# Patient Record
Sex: FEMALE | Race: WHITE | Employment: OTHER | URBAN - METROPOLITAN AREA
[De-identification: names, ages, dates, MRNs, and addresses within clinical notes are randomized per-mention and may not be internally consistent; named-entity substitution may affect disease eponyms.]

---

## 2017-09-15 RX ORDER — LORAZEPAM 2 MG/1
2 TABLET ORAL
Qty: 180 TAB | Refills: 0 | Status: SHIPPED | OUTPATIENT
Start: 2017-09-15 | End: 2017-09-15 | Stop reason: SDUPTHER

## 2017-09-15 NOTE — TELEPHONE ENCOUNTER
Requested Prescriptions     Pending Prescriptions Disp Refills    LORazepam (ATIVAN) 2 mg tablet 180 Tab 0     Sig: Take 2 tablets at bedtime       Last Refill: 06/21/2017  Next Appointment: none  Last Seen  01/12/2017

## 2017-09-15 NOTE — TELEPHONE ENCOUNTER
Requested Prescriptions     Pending Prescriptions Disp Refills    LORazepam (ATIVAN) 2 mg tablet 180 Tab 0     Sig: Take 1 Tab by mouth nightly. Max Daily Amount: 2 mg.        Last Refill: 6/21/17  Next Appointment:enedelia sy last seen 1/12/17

## 2017-09-18 RX ORDER — LORAZEPAM 2 MG/1
TABLET ORAL
Qty: 180 TAB | Refills: 0 | OUTPATIENT
Start: 2017-09-18 | End: 2017-12-15 | Stop reason: SDUPTHER

## 2017-12-05 ENCOUNTER — TELEPHONE (OUTPATIENT)
Dept: INTERNAL MEDICINE CLINIC | Age: 67
End: 2017-12-05

## 2017-12-05 NOTE — TELEPHONE ENCOUNTER
Patient would like to get generic for Crestor. Can you send a prescription over to [facility].  Please advise patient.

## 2017-12-13 NOTE — TELEPHONE ENCOUNTER
Requested Prescriptions     Pending Prescriptions Disp Refills    rosuvastatin (CRESTOR) 10 mg tablet 90 Tab 1     Sig: Take 1 Tab by mouth daily.        Last Refill: 09/18/17  Next Appointment:01/22/18

## 2017-12-14 RX ORDER — ROSUVASTATIN CALCIUM 10 MG/1
10 TABLET, COATED ORAL DAILY
Qty: 90 TAB | Refills: 3 | Status: SHIPPED | OUTPATIENT
Start: 2017-12-14 | End: 2018-12-10 | Stop reason: SDUPTHER

## 2017-12-15 RX ORDER — LORAZEPAM 2 MG/1
TABLET ORAL
Qty: 180 TAB | Refills: 0 | Status: SHIPPED | OUTPATIENT
Start: 2017-12-15 | End: 2018-03-09 | Stop reason: SDUPTHER

## 2018-01-18 PROBLEM — N20.0 NEPHROLITHIASIS: Status: ACTIVE | Noted: 2018-01-18

## 2018-01-18 PROBLEM — E89.40 POST HYSTERECTOMY MENOPAUSE: Status: ACTIVE | Noted: 2018-01-18

## 2018-01-18 PROBLEM — K52.9 INFLAMMATORY BOWEL DISEASE: Status: ACTIVE | Noted: 2018-01-18

## 2018-01-18 PROBLEM — F51.04 CHRONIC INSOMNIA: Status: ACTIVE | Noted: 2018-01-18

## 2018-01-18 PROBLEM — M54.30 SCIATICA: Status: ACTIVE | Noted: 2018-01-18

## 2018-01-18 PROBLEM — Z12.31 VISIT FOR SCREENING MAMMOGRAM: Status: ACTIVE | Noted: 2018-01-18

## 2018-01-18 PROBLEM — E78.5 DYSLIPIDEMIA: Status: ACTIVE | Noted: 2018-01-18

## 2018-01-18 PROBLEM — I10 HTN (HYPERTENSION): Status: ACTIVE | Noted: 2018-01-18

## 2018-01-18 PROBLEM — R94.39 EBT (ELECTRON BEAM TOMOGRAPHY) ABNORMAL: Status: ACTIVE | Noted: 2018-01-18

## 2018-01-18 PROBLEM — Z79.899 ON STATIN THERAPY: Status: ACTIVE | Noted: 2018-01-18

## 2018-01-18 PROBLEM — Z00.00 ANNUAL PHYSICAL EXAM: Status: ACTIVE | Noted: 2018-01-18

## 2018-01-18 PROBLEM — N39.0 ACUTE LOWER UTI: Status: ACTIVE | Noted: 2018-01-18

## 2018-01-18 PROBLEM — Z90.710 POST HYSTERECTOMY MENOPAUSE: Status: ACTIVE | Noted: 2018-01-18

## 2018-01-18 PROBLEM — H26.9 CATARACT: Status: ACTIVE | Noted: 2018-01-18

## 2018-01-18 PROBLEM — N60.19 FIBROCYSTIC BREAST CHANGES: Status: ACTIVE | Noted: 2018-01-18

## 2018-01-18 RX ORDER — FOLIC ACID 1 MG/1
TABLET ORAL DAILY
COMMUNITY

## 2018-01-18 RX ORDER — AMLODIPINE BESYLATE 5 MG/1
5 TABLET ORAL DAILY
COMMUNITY
End: 2018-01-22

## 2018-01-22 ENCOUNTER — OFFICE VISIT (OUTPATIENT)
Dept: INTERNAL MEDICINE CLINIC | Age: 68
End: 2018-01-22

## 2018-01-22 VITALS
BODY MASS INDEX: 26.8 KG/M2 | HEIGHT: 64 IN | TEMPERATURE: 97.9 F | DIASTOLIC BLOOD PRESSURE: 84 MMHG | SYSTOLIC BLOOD PRESSURE: 138 MMHG | HEART RATE: 83 BPM | WEIGHT: 157 LBS | RESPIRATION RATE: 18 BRPM | OXYGEN SATURATION: 97 %

## 2018-01-22 DIAGNOSIS — Z12.31 ENCOUNTER FOR SCREENING MAMMOGRAM FOR MALIGNANT NEOPLASM OF BREAST: ICD-10-CM

## 2018-01-22 DIAGNOSIS — Z79.899 ON STATIN THERAPY: ICD-10-CM

## 2018-01-22 DIAGNOSIS — Z79.899 HIGH RISK MEDICATION USE: ICD-10-CM

## 2018-01-22 DIAGNOSIS — Z13.6 SCREENING FOR ISCHEMIC HEART DISEASE: ICD-10-CM

## 2018-01-22 DIAGNOSIS — E78.5 DYSLIPIDEMIA: ICD-10-CM

## 2018-01-22 DIAGNOSIS — Z13.39 SCREENING FOR ALCOHOLISM: ICD-10-CM

## 2018-01-22 DIAGNOSIS — I10 ESSENTIAL HYPERTENSION: Chronic | ICD-10-CM

## 2018-01-22 DIAGNOSIS — Z13.1 SCREENING FOR DIABETES MELLITUS: ICD-10-CM

## 2018-01-22 DIAGNOSIS — Z11.59 NEED FOR HEPATITIS C SCREENING TEST: ICD-10-CM

## 2018-01-22 DIAGNOSIS — Z12.11 SCREEN FOR COLON CANCER: ICD-10-CM

## 2018-01-22 DIAGNOSIS — Z13.31 SCREENING FOR DEPRESSION: ICD-10-CM

## 2018-01-22 DIAGNOSIS — Z00.00 INITIAL MEDICARE ANNUAL WELLNESS VISIT: Primary | ICD-10-CM

## 2018-01-22 LAB
ALBUMIN SERPL-MCNC: 4.7 G/DL (ref 3.9–5.4)
ALKALINE PHOS POC: 94 U/L (ref 38–126)
ALT SERPL-CCNC: 43 U/L (ref 9–52)
AST SERPL-CCNC: 29 U/L (ref 14–36)
BACTERIA UA POCT, BACTPOCT: NORMAL
BILIRUB UR QL STRIP: NEGATIVE
BUN BLD-MCNC: 13 MG/DL (ref 7–17)
CALCIUM BLD-MCNC: 9.7 MG/DL (ref 8.4–10.2)
CASTS UA POCT: NORMAL
CHLORIDE BLD-SCNC: 93 MMOL/L (ref 98–107)
CHOLEST SERPL-MCNC: 203 MG/DL (ref 0–200)
CK (CPK) POC: 61 U/L (ref 30–135)
CLUE CELLS, CLUEPOCT: NEGATIVE
CO2 POC: 26 MMOL/L (ref 22–32)
CREAT BLD-MCNC: 0.6 MG/DL (ref 0.7–1.2)
CRYSTALS UA POCT, CRYSPOCT: NEGATIVE
EGFR (POC): 95
EPITHELIAL CELLS POCT: NEGATIVE
GLUCOSE POC: 91 MG/DL (ref 65–105)
GLUCOSE UR-MCNC: NEGATIVE MG/DL
GRAN# POC: 3.9 K/UL (ref 2–7.8)
GRAN% POC: 72.9 % (ref 37–92)
HCT VFR BLD CALC: 40.6 % (ref 37–51)
HDLC SERPL-MCNC: 102 MG/DL (ref 35–130)
HGB BLD-MCNC: 13.6 G/DL (ref 12–18)
KETONES P FAST UR STRIP-MCNC: NEGATIVE MG/DL
LDL CHOLESTEROL POC: 88.4 MG/DL (ref 0–130)
LY# POC: 1.1 K/UL (ref 0.6–4.1)
LY% POC: 21.2 % (ref 10–58.5)
MCH RBC QN: 33.2 PG (ref 26–32)
MCHC RBC-ENTMCNC: 33.6 G/DL (ref 30–36)
MCV RBC: 99 FL (ref 80–97)
MID #, POC: 0.3 K/UL (ref 0–1.8)
MID% POC: 5.9 % (ref 0.1–24)
MUCUS UA POCT, MUCPOCT: NORMAL
PH UR STRIP: 7 [PH] (ref 5–7)
PLATELET # BLD: 244 K/UL (ref 140–440)
POTASSIUM SERPL-SCNC: 3.6 MMOL/L (ref 3.6–5)
PROT SERPL-MCNC: 8.1 G/DL (ref 6.3–8.2)
PROT UR QL STRIP: NEGATIVE
RBC # BLD: 4.1 M/UL (ref 4.2–6.3)
RBC UA POCT, RBCPOCT: 0
SODIUM SERPL-SCNC: 130 MMOL/L (ref 137–145)
SP GR UR STRIP: 1.01 (ref 1.01–1.02)
TCHOL/HDL RATIO (POC): 2 (ref 0–4)
TOTAL BILIRUBIN POC: 1.1 MG/DL (ref 0.2–1.3)
TRICH UA POCT, TRICHPOC: NEGATIVE
TRIGL SERPL-MCNC: 63 MG/DL (ref 0–200)
UA UROBILINOGEN AMB POC: NORMAL (ref 0.2–1)
URINALYSIS CLARITY POC: CLEAR
URINALYSIS COLOR POC: NORMAL
URINE BLOOD POC: NEGATIVE
URINE CULT COMMENT, POCT: NORMAL
URINE LEUKOCYTES POC: NEGATIVE
URINE NITRITES POC: NEGATIVE
VLDLC SERPL CALC-MCNC: 12.6 MG/DL
WBC # BLD: 5.3 K/UL (ref 4.1–10.9)
WBC UA POCT, WBCPOCT: 0
YEAST UA POCT, YEASTPOC: NEGATIVE

## 2018-01-22 RX ORDER — METHOTREXATE 2.5 MG/1
20 TABLET ORAL
COMMUNITY
Start: 2018-01-18

## 2018-01-22 NOTE — PROGRESS NOTES
Chief Complaint   Patient presents with    Hypertension     6 month follow up; Room 8     1. Have you been to the ER, urgent care clinic since your last visit? Hospitalized since your last visit? No    2. Have you seen or consulted any other health care providers outside of the [address] [place] [address] since your last visit? Include any pap smears or colon screening.  No

## 2018-01-22 NOTE — PATIENT INSTRUCTIONS
Medicare Wellness Visit, Female    The best way to live healthy is to have a healthy lifestyle by eating a well-balanced diet, exercising regularly, limiting alcohol and stopping smoking. Regular physical exams and screening tests are another way to keep healthy. Preventive exams provided by your health care provider can find health problems before they become diseases or illnesses. Preventive services including immunizations, screening tests, monitoring and exams can help you take care of your own health. All people over age 72 should have a pneumovax  and and a prevnar shot to prevent pneumonia. These are once in a lifetime unless you and your provider decide differently. All people over 65 should have a yearly flu shot and a tetanus vaccine every 10 years. A bone mass density to screen for osteoporosis or thinning of the bones should be done every 2 years after 65. Screening for diabetes mellitus with a blood sugar test should be done every year. Glaucoma is a disease of the eye due to increased ocular pressure that can lead to blindness and it should be done every year by an eye professional.    Cardiovascular screening tests that check for elevated lipids (fatty part of blood) which can lead to heart disease and strokes should be done every 5 years. Colorectal screening that evaluates for blood or polyps in your colon should be done yearly as a stool test or every five years as a flexible sigmoidoscope or every 10 years as a colonoscopy up to age 76. Breast cancer screening with a mammogram is recommended biennially  for women age 54-69. Screening for cervical cancer with a pap smear and pelvic exam is recommended for women after age 72 years every 2 years up to age 79 or when the provider and patient decide to stop. If there is a history of cervical abnormalities or other increased risk for cancer then the test is recommended yearly.     Hepatitis C screening is also recommended for anyone born between 80 through [address]. A shingles vaccine is also recommended once in a lifetime after age 61. Your Medicare Wellness Exam is recommended annually. Here is a list of your current Health Maintenance items with a due date:  Health Maintenance Due   Topic Date Due    Hepatitis C Test  1950    DTaP/Tdap/Td  (1 - Tdap) 03/11/1972    Stool testing for trace blood  03/11/2001    Shingles Vaccine  01/11/2011    Breast Cancer Screening  08/11/2013    Glaucoma Screening   03/11/2016    Bone Density Screening  03/11/2016    Pneumococcal Vaccine (1 of 2 - PCV13) 03/11/2016    Annual Well Visit  03/11/2016    Flu Vaccine  08/01/2017       All Medicare beneficiaries aged 48 and older are covered; however, when a beneficiary is at high risk, there is no minimum age required to receive a screening colonoscopy or a barium enema rendered as an alternative to a screening colonoscopy. The following are the coverage criteria for each colorectal cancer screening test/procedure. Screening FOBT  Medicare provides coverage of a screening FOBT annually (i.e., at least 11 months have passed following the month in which the last covered screening FOBT was performed) for beneficiaries aged 48 and older. This screening requires a written order from the beneficiarys attending physician. NOTE: Medicare will only provide coverage for one FOBT per year: either HCPCS code  or CPT code 99288, but not both. Screening Colonoscopy  For Beneficiaries at [facility] for Developing Colorectal Cancer  Medicare provides coverage of a screening colonoscopy (HCPCS code ) once every 2 years for beneficiaries at high risk for developing colorectal cancer (i.e., at least 23 months have passed following the month in which the last covered screening colonoscopy [HCPCS code ] was performed).     For Beneficiaries Not at [address] for Developing Colorectal Cancer  Medicare provides coverage of a screening colonoscopy (\[facility] [place] [facility]\"" code ) for beneficiaries who do not meet the criteria for being at high risk for developing colorectal cancer once every 10 years (i.e., at least 119 months have passed following the month in which the last covered screening colonoscopy [facility]). If the beneficiary otherwise qualifies to have a covered screening colonoscopy ([facility] ) based on the above but has had a covered screening flexible sigmoidoscopy ([facility] code ), then Medicare may cover a screening colonoscopy ([facility] ) only after at least 47 months have passed following the month in which the last covered screening flexible sigmoidoscopy ([facility] ) was performed. Beneficiaries Diagnosed with Pre-Diabetes  Medicare provides coverage for a maximum of 2 diabetes screening tests within a 12-month period (but not less than 6 months apart) for beneficiaries diagnosed with pre-diabetes. Beneficiaries Previously Tested but not Diagnosed as Pre-Diabetic or Who Have Never Been Tested  Medicare provides coverage for 1 diabetes screening test within a 12-month period (i.e., at least 11 months have passed following the month in which the last Medicare-covered diabetes screening test was performed) for beneficiaries who were previously tested and were not diagnosed with pre-diabetes, or who have never been tested. Risk Factors  To be eligible for the diabetes screening tests, beneficiaries must have any of the following risk factors:   Hypertension,   Dyslipidemia,   Obesity (a body mass index greater than or equal to 30 kg/m), or   Previous identification of an elevated impaired fasting glucose or glucose tolerance.   OR  At least two of the following characteristics:   Overweight (a body mass index greater than 25 but less than 30 kg/m),   Family history of diabetes,   Aged 72 years and older, or   A history of gestational diabetes mellitus or delivery of a baby weighing greater than 9 pounds.

## 2018-01-22 NOTE — MR AVS SNAPSHOT
[address] [place] [address] [address] [address] [phone] Patient: Ashley Lott MRN: [id number] Visit Information Date & Time Provider Department Dept. Phone Encounter #  
 1/22/2018  2:30 PM VIJAY De La O MD [facility] [id number] Upcoming Health Maintenance Date Due Hepatitis C Screening 1950 DTaP/Tdap/Td series (1 - Tdap) 3/11/1972 FOBT Q 1 YEAR AGE 50-75 3/11/2001 ZOSTER VACCINE AGE 60> 1/11/2011 BREAST CANCER SCRN MAMMOGRAM 8/11/2013 GLAUCOMA SCREENING Q2Y 3/11/2016 OSTEOPOROSIS SCREENING (DEXA) 3/11/2016 Pneumococcal 65+ Low/Medium Risk (1 of 2 - PCV13) 3/11/2016 MEDICARE YEARLY EXAM 3/11/2016 Influenza Age 5 to Adult 8/1/2017 Allergies as of 1/22/2018  Review Complete On: 1/22/2018 By: Linda York MD  
  
 Severity Noted Reaction Type Reactions Levaquin [Levofloxacin]  01/18/2018    Hives Current Immunizations  Reviewed on 6/7/2011 No immunizations on file. Not reviewed this visit You Were Diagnosed With   
  
 Codes Comments Initial Medicare annual wellness visit    -  Primary ICD-10-CM: Z00.00 ICD-9-CM: V70.0 Essential hypertension     ICD-10-CM: I10 
ICD-9-CM: 401.9 On statin therapy     ICD-10-CM: Z79.899 ICD-9-CM: V58.69 Dyslipidemia     ICD-10-CM: E78.5 ICD-9-CM: 272.4 Screening for alcoholism     ICD-10-CM: Z13.89 ICD-9-CM: V79.1 Screening for depression     ICD-10-CM: Z13.89 ICD-9-CM: V79.0 Screen for colon cancer     ICD-10-CM: Z12.11 ICD-9-CM: V76.51 Screening for diabetes mellitus     ICD-10-CM: Z13.1 ICD-9-CM: V77.1 Screening for ischemic heart disease     ICD-10-CM: Z13.6 ICD-9-CM: V81.0 Need for hepatitis C screening test     ICD-10-CM: Z11.59 
ICD-9-CM: V73.89  Encounter for screening mammogram for malignant neoplasm of breast ICD-10-CM: Z12.31 
ICD-9-CM: V76.12 High risk medication use     ICD-10-CM: Z79.899 ICD-9-CM: V58.69 Vitals BP Pulse Temp Resp Height(growth percentile) Weight(growth percentile) 138/84 (BP 1 Location: Left arm, BP Patient Position: Sitting) 83 97.9 °F (36.6 °C) (Oral) 18 5' 4\" (1.626 m) 157 lb (71.2 kg) SpO2 BMI OB Status Smoking Status 97% 26.95 kg/m2 Hysterectomy Never Smoker Vitals History BMI and BSA Data Body Mass Index Body Surface Area  
 26.95 kg/m 2 1.79 m 2 Preferred Pharmacy Pharmacy Name Phone [id number] - [place], [address] [facility] [phone] Your Updated Medication List  
  
   
This list is accurate as of: 1/22/18  3:54 PM.  Always use your most recent med list.  
  
  
  
  
 aspirin 81 mg tablet Take 81 mg by mouth. dilTIAZem  mg XR capsule Commonly known as:  DILACOR XR Take  by mouth daily. FISH OIL 1,000 mg Cap Generic drug:  omega-3 fatty acids-vitamin e Take 1 Cap by mouth daily. folic acid 1 mg tablet Commonly known as:  [employer] Take  by mouth daily. GLUCOSAMINE-CHONDROITIN PO Take 1 Tab by mouth daily. LORazepam 2 mg tablet Commonly known as:  ATIVAN Take 2 tablets at bedtime  
  
 methotrexate 2.5 mg tablet Commonly known as:  RHEUMATREX  
  
 methotrexate 2.5 mg/mL Soln Take  by mouth. MULTIPLE VITAMIN tablet Generic drug:  multivitamin Take 1 Tab by mouth daily. rosuvastatin 10 mg tablet Commonly known as:  CRESTOR Take 1 Tab by mouth daily. VITAMIN D2 PO Take  by mouth. ZyrTEC 10 mg Cap Generic drug:  Cetirizine Take  by mouth. We Performed the Following AMB POC CK (CPK) [30553 CPT(R)] AMB POC COMPLETE CBC,AUTOMATED ENTER [id number] CPT(R)] AMB POC COMPREHENSIVE METABOLIC PANEL [25246 CPT(R)] AMB POC LIPID PROFILE [08992 CPT(R)] AMB POC URINALYSIS DIP STICK AUTO W/ MICRO  [77590 CPT(R)] COLOGUARD TEST (FECAL DNA COLORECTAL CANCER SCREENING) [59743 CPT(R)] Emilie 68 [[id number] [facility]] HEPATITIS C AB [65308 CPT(R)] NE ANNUAL ALCOHOL SCREEN 15 MIN [id number] [facility]] To-Do List   
 01/25/2018 Imaging:  JAMIN MAMMO BI SCREENING INCL CAD Patient Instructions Medicare Wellness Visit, Female The best way to live healthy is to have a healthy lifestyle by eating a well-balanced diet, exercising regularly, limiting alcohol and stopping smoking. Regular physical exams and screening tests are another way to keep healthy. Preventive exams provided by your health care provider can find health problems before they become diseases or illnesses. Preventive services including immunizations, screening tests, monitoring and exams can help you take care of your own health. All people over age 72 should have a pneumovax  and and a prevnar shot to prevent pneumonia. These are once in a lifetime unless you and your provider decide differently. All people over 65 should have a yearly flu shot and a tetanus vaccine every 10 years. A bone mass density to screen for osteoporosis or thinning of the bones should be done every 2 years after 65. Screening for diabetes mellitus with a blood sugar test should be done every year. Glaucoma is a disease of the eye due to increased ocular pressure that can lead to blindness and it should be done every year by an eye professional. 
 
Cardiovascular screening tests that check for elevated lipids (fatty part of blood) which can lead to heart disease and strokes should be done every 5 years. Colorectal screening that evaluates for blood or polyps in your colon should be done yearly as a stool test or every five years as a flexible sigmoidoscope or every 10 years as a colonoscopy up to age 76. Breast cancer screening with a mammogram is recommended biennially  for women age 54-69. Screening for cervical cancer with a pap smear and pelvic exam is recommended for women after age 72 years every 2 years up to age 79 or when the provider and patient decide to stop. If there is a history of cervical abnormalities or other increased risk for cancer then the test is recommended yearly. Hepatitis C screening is also recommended for anyone born between 80 through [address]. A shingles vaccine is also recommended once in a lifetime after age 61. Your Medicare Wellness Exam is recommended annually. Here is a list of your current Health Maintenance items with a due date: 
Health Maintenance Due Topic Date Due  
 Hepatitis C Test  1950  
 DTaP/Tdap/Td  (1 - Tdap) 03/11/1972  Stool testing for trace blood  03/11/2001  Shingles Vaccine  01/11/2011  Breast Cancer Screening  08/11/2013  Glaucoma Screening   03/11/2016  Bone Density Screening  03/11/2016  Pneumococcal Vaccine (1 of 2 - PCV13) 03/11/2016 [facility] Annual Well Visit  03/11/2016  Flu Vaccine  08/01/2017 All Medicare beneficiaries aged 48 and older are covered; however, when a beneficiary is at high risk, there is no minimum age required to receive a screening colonoscopy or a barium enema rendered as an alternative to a screening colonoscopy. The following are the coverage criteria for each colorectal cancer screening test/procedure. Screening FOBT Medicare provides coverage of a screening FOBT annually (i.e., at least 11 months have passed following the month in which the last covered screening FOBT was performed) for beneficiaries aged 48 and older. This screening requires a written order from the beneficiarys attending physician. NOTE: Medicare will only provide coverage for one FOBT per year: either HCPCS code  or CPT code 66005, but not both. Screening Colonoscopy For Beneficiaries at [address] for Developing Colorectal Cancer Medicare provides coverage of a screening colonoscopy ([facility] ) once every 2 years for beneficiaries at high risk for developing colorectal cancer (i.e., at least 23 months have passed following the month in which the last covered screening colonoscopy [HCPCS code ] was performed). For Beneficiaries Not at [address] for Developing Colorectal Cancer Medicare provides coverage of a screening colonoscopy (HCPCS code ) for beneficiaries who do not meet the criteria for being at high risk for developing colorectal cancer once every 10 years (i.e., at least 119 months have passed following the month in which the last covered screening colonoscopy [HCPCS code ] was performed). If the beneficiary otherwise qualifies to have a covered screening colonoscopy (HCPCS code ) based on the above but has had a covered screening flexible sigmoidoscopy (HCPCS code ), then Medicare may cover a screening colonoscopy (HCPCS code ) only after at least 47 months have passed following the month in which the last covered screening flexible sigmoidoscopy ([facility] ) was performed. Beneficiaries Diagnosed with Pre-Diabetes Medicare provides coverage for a maximum of 2 diabetes screening tests within a 12-month period (but not less than 6 months apart) for beneficiaries diagnosed with pre-diabetes. Beneficiaries Previously Tested but not Diagnosed as Pre-Diabetic or Who Have Never Been Tested Medicare provides coverage for 1 diabetes screening test within a 12-month period (i.e., at least 11 months have passed following the month in which the last Medicare-covered diabetes screening test was performed) for beneficiaries who were previously tested and were not diagnosed with pre-diabetes, or who have never been tested. Risk Factors To be eligible for the diabetes screening tests, beneficiaries must have any of the following risk factors:  Hypertension,  Dyslipidemia, 
 Obesity (a body mass index greater than or equal to 30 kg/m), or 
 Previous identification of an elevated impaired fasting glucose or glucose tolerance. OR At least two of the following characteristics:  Overweight (a body mass index greater than 25 but less than 30 kg/m),  Family history of diabetes,  Aged 72 years and older, or  A history of gestational diabetes mellitus or delivery of a baby weighing greater than 9 pounds. Introducing [facility]! [facility] introduces [employer] patient portal. Now you can access parts of your medical record, email your doctor's office, and request medication refills online. 1. In your internet browser, go to [email or web address] 2. Click on the First Time User? Click Here link in the Sign In box. You will see the New Member Sign Up page. 3. Enter your [employer] Access Code exactly as it appears below. You will not need to use this code after youve completed the sign-up process. If you do not sign up before the expiration date, you must request a new code. · [employer] Access Code: [email or web address] Expires: 4/22/2018  2:36 PM 
 
4. Enter the last four digits of your Social Security Number (xxxx) and Date of Birth (mm/dd/yyyy) as indicated and click Submit. You will be taken to the next sign-up page. 5. Create a [employer] ID. This will be your [employer] login ID and cannot be changed, so think of one that is secure and easy to remember. 6. Create a [employer] password. You can change your password at any time. 7. Enter your Password Reset Question and Answer. This can be used at a later time if you forget your password. 8. Enter your e-mail address. You will receive e-mail notification when new information is available in [address] 9. Click Sign Up. You can now view and download portions of your medical record. 10. Click the Download Summary menu link to download a portable copy of your medical information. If you have questions, please visit the Frequently Asked Questions section of the [employer] website. Remember, [employer] is NOT to be used for urgent needs. For medical emergencies, dial 911. Now available from your iPhone and Android! Please provide this summary of care documentation to your next provider. Your primary care clinician is listed as VIJAY Guzman. If you have any questions after today's visit, please call [phone].

## 2018-01-22 NOTE — PROGRESS NOTES
This is an Initial Medicare Annual Wellness Exam (AWV) (Performed 12 months after IPPE or effective date of Medicare Part B enrollment, Once in a lifetime)    I have reviewed the patient's medical history in detail and updated the computerized patient record. History     Past Medical History:   Diagnosis Date    Acute lower UTI 1/18/2018    Annual physical exam 1/18/2018    Arrhythmia 2000    TACHYCARDIA RHYTHM x1 episode 2000    Arthritis     spinal stenosis; previous spine surgery    Cataract 1/18/2018    Chronic insomnia 1/18/2018    Chronic kidney disease 2009    STONES    Dyslipidemia 1/18/2018    EBT (electron beam tomography) abnormal 1/18/2018    Fibrocystic breast changes 1/18/2018    High cholesterol     HTN (hypertension) 1/18/2018    Inflammatory bowel disease 1/18/2018    Nephrolithiasis 1/18/2018    On statin therapy 1/18/2018    Post hysterectomy menopause 1/18/2018    Sciatica 1/18/2018    Visit for screening mammogram 1/18/2018      Past Surgical History:   Procedure Laterality Date    HX CATARACT REMOVAL      HX GYN      hysterectomy 1980    HX TONSILLECTOMY      HX UROLOGICAL  2009    URETERAL STENT    NEUROLOGICAL PROCEDURE UNLISTED  6/11    Back; 2 buldging disc fused, bone removed from hip     Current Outpatient Prescriptions   Medication Sig Dispense Refill    methotrexate (RHEUMATREX) 2.5 mg tablet       folic acid (FOLVITE) 1 mg tablet Take  by mouth daily.  ERGOCALCIFEROL, VITAMIN D2, (VITAMIN D2 PO) Take  by mouth.  Cetirizine (ZYRTEC) 10 mg cap Take  by mouth.  LORazepam (ATIVAN) 2 mg tablet Take 2 tablets at bedtime 180 Tab 0    rosuvastatin (CRESTOR) 10 mg tablet Take 1 Tab by mouth daily. 90 Tab 3    multivitamin (MULTIPLE VITAMIN) tablet Take 1 Tab by mouth daily.  omega-3 fatty acids-vitamin e (FISH OIL) 1,000 mg Cap Take 1 Cap by mouth daily.  aspirin 81 mg tablet Take 81 mg by mouth.       diltiazem XR (DILACOR XR) 240 mg XR capsule Take  by mouth daily.  methotrexate 2.5 mg/mL soln Take  by mouth.  GLUCOSAMINE HCL/CHONDRO PAYNE A (GLUCOSAMINE-CHONDROITIN PO) Take 1 Tab by mouth daily. Allergies   Allergen Reactions    Levaquin [Levofloxacin] Hives     Family History   Problem Relation Age of Onset    Heart Disease Father      4 VESSEL CABG    Alcohol abuse Maternal Grandmother     Heart Disease Maternal Grandmother      MI    Alcohol abuse Maternal Grandfather     Asthma Paternal Grandmother      Social History   Substance Use Topics    Smoking status: Never Smoker    Smokeless tobacco: Never Used    Alcohol use 6.0 oz/week     12 Cans of beer per week     Patient Active Problem List   Diagnosis Code    Spinal stenosis, lumbar region, with neurogenic claudication M48.062    Hypertension I10    Nephrolithiasis N20.0    EBT (electron beam tomography) abnormal R94.39    Inflammatory bowel disease K52.9    Post hysterectomy menopause E89.40, Z90.710    On statin therapy Z79.899    Dyslipidemia E78.5    HTN (hypertension) I10    Annual physical exam Z00.00    Cataract H26.9    Visit for screening mammogram Z12.31    Chronic insomnia F51.04    Fibrocystic breast changes N60.19    Sciatica M54.30    Acute lower UTI N39.0    Essential hypertension I10       Depression Risk Factor Screening:     PHQ over the last two weeks 1/22/2018   Little interest or pleasure in doing things Not at all   Feeling down, depressed or hopeless Not at all   Total Score PHQ 2 0     Alcohol Risk Factor Screening: You average more than 7 drinks a week. Functional Ability and Level of Safety:     Hearing Loss  Hearing is good. Activities of Daily Living  The home contains: no safety equipment. Patient does total self care    Fall Risk  Fall Risk Assessment, last 12 mths 1/22/2018   Able to walk? Yes   Fall in past 12 months?  No       Abuse Screen  Patient is not abused    Cognitive Screening   Evaluation of Cognitive Function:  Has your family/caregiver stated any concerns about your memory: no  Normal    Patient Care Team   Patient Care Team:  Elvin Umaña MD as PCP - General (Internal Medicine)    Assessment/Plan   Education and counseling provided:  Are appropriate based on today's review and evaluation    Diagnoses and all orders for this visit:    1. Essential hypertension    2. On statin therapy    3. Dyslipidemia    4. Initial Medicare annual wellness visit    5. Screening for alcoholism  -     Annual  Alcohol Screen 15 min ()    6. Screening for depression  -     Depression Screen Annual    7. Screen for colon cancer    8. Screening for diabetes mellitus    9. Screening for ischemic heart disease    10. Need for hepatitis C screening test  -     HEPATITIS C AB    11. Encounter for screening mammogram for malignant neoplasm of breast  -     Bilateral Digital Screening Mammography; Future         Health Maintenance Due   Topic Date Due    Hepatitis C Screening  1950    DTaP/Tdap/Td series (1 - Tdap) 03/11/1972    FOBT Q 1 YEAR AGE 50-75  03/11/2001    ZOSTER VACCINE AGE 60>  01/11/2011    BREAST CANCER SCRN MAMMOGRAM  08/11/2013    GLAUCOMA SCREENING Q2Y  03/11/2016    OSTEOPOROSIS SCREENING (DEXA)  03/11/2016    Pneumococcal 65+ Low/Medium Risk (1 of 2 - PCV13) 03/11/2016    MEDICARE YEARLY EXAM  03/11/2016    Influenza Age 9 to Adult  08/01/2017     This note will not be viewable in [address] Iris Swain is a 78 y.o. female and presents with Hypertension (6 month follow up; Room 8)  . Subjective:  Mrs. Roman Kidd presents today for follow-up of several problems including hypertension and hyperlipidemia. She is doing well on her current medical regimen and denies any side effects from her medication. She has had no chest pain, shortness breath, PND, orthopnea, or pedal edema.     Past Medical History:   Diagnosis Date    Acute lower UTI 1/18/2018    Annual physical exam 1/18/2018    Arrhythmia 2000    TACHYCARDIA RHYTHM x1 episode 2000    Arthritis     spinal stenosis; previous spine surgery    Cataract 1/18/2018    Chronic insomnia 1/18/2018    Chronic kidney disease 2009    STONES    Dyslipidemia 1/18/2018    EBT (electron beam tomography) abnormal 1/18/2018    Fibrocystic breast changes 1/18/2018    High cholesterol     HTN (hypertension) 1/18/2018    Inflammatory bowel disease 1/18/2018    Nephrolithiasis 1/18/2018    On statin therapy 1/18/2018    Post hysterectomy menopause 1/18/2018    Sciatica 1/18/2018    Visit for screening mammogram 1/18/2018     Past Surgical History:   Procedure Laterality Date    HX CATARACT REMOVAL      HX GYN      hysterectomy 1980    HX TONSILLECTOMY      HX UROLOGICAL  2009    URETERAL STENT    NEUROLOGICAL PROCEDURE UNLISTED  6/11    Back; 2 buldging disc fused, bone removed from hip     Allergies   Allergen Reactions    Levaquin [Levofloxacin] Hives     Current Outpatient Prescriptions   Medication Sig Dispense Refill    methotrexate (RHEUMATREX) 2.5 mg tablet       folic acid (FOLVITE) 1 mg tablet Take  by mouth daily.  ERGOCALCIFEROL, VITAMIN D2, (VITAMIN D2 PO) Take  by mouth.  Cetirizine (ZYRTEC) 10 mg cap Take  by mouth.  LORazepam (ATIVAN) 2 mg tablet Take 2 tablets at bedtime 180 Tab 0    rosuvastatin (CRESTOR) 10 mg tablet Take 1 Tab by mouth daily. 90 Tab 3    multivitamin (MULTIPLE VITAMIN) tablet Take 1 Tab by mouth daily.  omega-3 fatty acids-vitamin e (FISH OIL) 1,000 mg Cap Take 1 Cap by mouth daily.  aspirin 81 mg tablet Take 81 mg by mouth.  diltiazem XR (DILACOR XR) 240 mg XR capsule Take  by mouth daily.  methotrexate 2.5 mg/mL soln Take  by mouth.  GLUCOSAMINE HCL/CHONDRO PAYNE A (GLUCOSAMINE-CHONDROITIN PO) Take 1 Tab by mouth daily.        Social History     Social History    Marital status:      Spouse name: N/A    Number of children: N/A    Years of education: N/A Social History Main Topics    Smoking status: Never Smoker    Smokeless tobacco: Never Used    Alcohol use 6.0 oz/week     12 Cans of beer per week    Drug use: Yes     Special: Prescription    Sexual activity: Not Asked     Other Topics Concern    None     Social History Narrative     Family History   Problem Relation Age of Onset    Heart Disease Father      4 VESSEL CABG    Alcohol abuse Maternal Grandmother     Heart Disease Maternal Grandmother      MI    Alcohol abuse Maternal Grandfather     Asthma Paternal Grandmother        Review of Systems  Constitutional:  negative for fevers, chills, anorexia and weight loss  Eyes:    negative for visual disturbance and irritation  ENT:    negative for tinnitus,sore throat,nasal congestion,ear pains. hoarseness  Respiratory:     negative for cough, hemoptysis, dyspnea,wheezing  CV:    negative for chest pain, palpitations, lower extremity edema  GI:    negative for nausea, vomiting, diarrhea, abdominal pain,melena  Endo:               negative for polyuria,polydipsia,polyphagia,heat intolerance  Genitourinary : negative for frequency, dysuria and hematuria  Integumentary: negative for rash and pruritus  Hematologic:   negative for easy bruising and gum/nose bleeding  Musculoskel:  negative for myalgias, arthralgias, back pain, muscle weakness, joint pain  Neurological:   negative for headaches, dizziness, vertigo, memory problems and gait   Behavl/Psych:  negative for feelings of anxiety, depression, mood changes  ROS otherwise negative      Objective:  Visit Vitals    /84 (BP 1 Location: Left arm, BP Patient Position: Sitting)    Pulse 83    Temp 97.9 °F (36.6 °C) (Oral)    Resp 18    Ht 5' 4\" (1.626 m)    Wt 157 lb (71.2 kg)    SpO2 97%    BMI 26.95 kg/m2     Physical Exam:   General appearance - alert, well appearing, and in no distress  Mental status - alert, oriented to person, place, and time  EYE-ABDIAZIZ, EOMI, fundi normal, corneas normal, no foreign bodies  ENT-ENT exam normal, no neck nodes or sinus tenderness  Nose - normal and patent, no erythema, discharge or polyps  Mouth - mucous membranes moist, pharynx normal without lesions  Neck - supple, no significant adenopathy   Chest - clear to auscultation, no wheezes, rales or rhonchi, symmetric air entry   Heart - normal rate, regular rhythm, normal S1, S2, no murmurs, rubs, clicks or gallops   Abdomen - soft, nontender, nondistended, no masses or organomegaly  Lymph- no adenopathy palpable  Ext-peripheral pulses normal, no pedal edema, no clubbing or cyanosis  Skin-Warm and dry. no hyperpigmentation, vitiligo, or suspicious lesions  Neuro -alert, oriented, normal speech, no focal findings, mild left-sided facial weakness, or residual ptosis from previous Bell's palsy      Assessment/Plan:  Diagnoses and all orders for this visit:    1. Initial Medicare annual wellness visit    2. Essential hypertension  -     AMB POC URINALYSIS DIP STICK AUTO W/ MICRO     3. On statin therapy  -     AMB POC COMPREHENSIVE METABOLIC PANEL  -     AMB POC CK (CPK)    4. Dyslipidemia  -     AMB POC LIPID PROFILE    5. Screening for alcoholism  -     Annual  Alcohol Screen 15 min ()    6. Screening for depression  -     Depression Screen Annual    7. Screen for colon cancer  -     COLOGUARD TEST (FECAL DNA COLORECTAL CANCER SCREENING)    8. Screening for diabetes mellitus    9. Screening for ischemic heart disease    10. Need for hepatitis C screening test  -     HEPATITIS C AB    11. Encounter for screening mammogram for malignant neoplasm of breast  -     Bilateral Digital Screening Mammography; Future    12. High risk medication use  -     AMB POC COMPLETE CBC,AUTOMATED ENTER          ICD-10-CM ICD-9-CM    1. Initial Medicare annual wellness visit Z00.00 V70.0    2. Essential hypertension I10 401.9 AMB POC URINALYSIS DIP STICK AUTO W/ MICRO    3.  On statin therapy Z79.899 V58.69 AMB POC COMPREHENSIVE METABOLIC PANEL      AMB POC CK (CPK)   4. Dyslipidemia E78.5 272.4 AMB POC LIPID PROFILE   5. Screening for alcoholism Z13.89 V79.1 PA ANNUAL ALCOHOL SCREEN 15 MIN   6. Screening for depression Z13.89 V79.0 DEPRESSION SCREEN ANNUAL   7. Screen for colon cancer Z12.11 V76.51 COLOGUARD TEST (FECAL DNA COLORECTAL CANCER SCREENING)   8. Screening for diabetes mellitus Z13.1 V77.1    9. Screening for ischemic heart disease Z13.6 V81.0    10. Need for hepatitis C screening test Z11.59 V73.89 HEPATITIS C AB   11. Encounter for screening mammogram for malignant neoplasm of breast Z12.31 V76.12 JAMIN MAMMO BI SCREENING INCL CAD   12. High risk medication use Z79.899 V58.69 AMB POC COMPLETE CBC,AUTOMATED ENTER     Plan:    Continue medications as outlined above. Further recommendations based on lab results. She will be referred for screening mammography. She will be set up for a cologuard test for colon cancer screening. She understands that this is positive she will need a colonoscopy. She declines vaccines given her previous history of Bell's palsy citing a concern for recurring Bell's palsy in patients who have been given a flu vaccine in the medical literature. Follow-up Disposition: Not on File    I have reviewed with the patient details of the assessment and plan and all questions were answered. Relevent patient education was performed. Verbal and/or written instructions (see AVS) provided. The most recent lab findings were reviewed with the patient. Plan was discussed with patient who verbally expressed understanding. An After Visit Summary was printed and given to the patient.     Kartik Peterson MD

## 2018-01-23 LAB — HCV AB S/CO SERPL IA: <0.1 S/CO RATIO (ref 0–0.9)

## 2018-02-06 ENCOUNTER — HOSPITAL ENCOUNTER (OUTPATIENT)
Dept: MAMMOGRAPHY | Age: 68
Discharge: HOME | End: 2018-02-06
Attending: INTERNAL MEDICINE
Payer: MEDICARE

## 2018-02-06 DIAGNOSIS — Z12.31 ENCOUNTER FOR SCREENING MAMMOGRAM FOR MALIGNANT NEOPLASM OF BREAST: ICD-10-CM

## 2018-02-06 PROCEDURE — 77067 SCR MAMMO BI INCL CAD: CPT

## 2018-03-09 DIAGNOSIS — F41.1 GAD (GENERALIZED ANXIETY DISORDER): Primary | ICD-10-CM

## 2018-03-09 RX ORDER — LORAZEPAM 2 MG/1
TABLET ORAL
Qty: 180 TAB | Refills: 0 | Status: SHIPPED | OUTPATIENT
Start: 2018-03-09 | End: 2018-04-19 | Stop reason: SDUPTHER

## 2018-03-09 NOTE — TELEPHONE ENCOUNTER
Requested Prescriptions     Pending Prescriptions Disp Refills    LORazepam (ATIVAN) 2 mg tablet 180 Tab 0     Sig: Take 2 tablets at bedtime     Upcoming visit:  07/23/2018  Recent visit:       01/22/2018

## 2018-04-19 DIAGNOSIS — F41.1 GAD (GENERALIZED ANXIETY DISORDER): ICD-10-CM

## 2018-04-19 RX ORDER — LORAZEPAM 2 MG/1
TABLET ORAL
Qty: 180 TAB | Refills: 0 | Status: SHIPPED | OUTPATIENT
Start: 2018-04-19 | End: 2018-06-11 | Stop reason: SDUPTHER

## 2018-04-19 NOTE — TELEPHONE ENCOUNTER
Requested Prescriptions     Pending Prescriptions Disp Refills    LORazepam (ATIVAN) 2 mg tablet 180 Tab 0     Sig: Take 2 tablets at bedtime       Last Refill: 3/9/18  Next Appointment:7/23/18

## 2018-05-09 RX ORDER — AMLODIPINE BESYLATE 5 MG/1
TABLET ORAL
Qty: 90 TAB | Refills: 2 | Status: SHIPPED | OUTPATIENT
Start: 2018-05-09 | End: 2019-01-29 | Stop reason: SDUPTHER

## 2018-06-11 DIAGNOSIS — F41.1 GAD (GENERALIZED ANXIETY DISORDER): ICD-10-CM

## 2018-06-11 RX ORDER — LORAZEPAM 2 MG/1
TABLET ORAL
Qty: 180 TAB | Refills: 0 | Status: SHIPPED | OUTPATIENT
Start: 2018-06-11 | End: 2018-09-10 | Stop reason: SDUPTHER

## 2018-06-11 NOTE — TELEPHONE ENCOUNTER
Requested Prescriptions     Pending Prescriptions Disp Refills    LORazepam (ATIVAN) 2 mg tablet 180 Tab 0     Sig: Take 2 tablets at bedtime       Last Refill: 4/19/18  Next Appointment:7/23/18

## 2018-09-10 DIAGNOSIS — F41.1 GAD (GENERALIZED ANXIETY DISORDER): ICD-10-CM

## 2018-09-10 RX ORDER — LORAZEPAM 2 MG/1
TABLET ORAL
Qty: 180 TAB | Refills: 0 | Status: SHIPPED | OUTPATIENT
Start: 2018-09-10 | End: 2018-12-10 | Stop reason: SDUPTHER

## 2018-12-10 DIAGNOSIS — F41.1 GAD (GENERALIZED ANXIETY DISORDER): ICD-10-CM

## 2018-12-10 NOTE — TELEPHONE ENCOUNTER
Requested Prescriptions     Pending Prescriptions Disp Refills    LORazepam (ATIVAN) 2 mg tablet 180 Tab 0     Sig: Take 2 tablets at bedtime       Last Refill: 09/10/18  Next Appointment:02/25/19

## 2018-12-11 RX ORDER — ROSUVASTATIN CALCIUM 10 MG/1
TABLET, COATED ORAL
Qty: 90 TAB | Refills: 2 | Status: SHIPPED | OUTPATIENT
Start: 2018-12-11 | End: 2019-09-04 | Stop reason: SDUPTHER

## 2018-12-11 RX ORDER — LORAZEPAM 2 MG/1
TABLET ORAL
Qty: 180 TAB | Refills: 0 | Status: SHIPPED | OUTPATIENT
Start: 2018-12-11 | End: 2019-03-08 | Stop reason: SDUPTHER

## 2019-01-30 RX ORDER — AMLODIPINE BESYLATE 5 MG/1
TABLET ORAL
Qty: 90 TAB | Refills: 1 | Status: SHIPPED | OUTPATIENT
Start: 2019-01-30 | End: 2019-07-23 | Stop reason: SDUPTHER

## 2019-02-25 ENCOUNTER — OFFICE VISIT (OUTPATIENT)
Dept: INTERNAL MEDICINE CLINIC | Age: 69
End: 2019-02-25

## 2019-02-25 VITALS
TEMPERATURE: 97.5 F | BODY MASS INDEX: 26.63 KG/M2 | HEIGHT: 64 IN | HEART RATE: 68 BPM | DIASTOLIC BLOOD PRESSURE: 78 MMHG | SYSTOLIC BLOOD PRESSURE: 138 MMHG | OXYGEN SATURATION: 98 % | WEIGHT: 156 LBS | RESPIRATION RATE: 16 BRPM

## 2019-02-25 DIAGNOSIS — Z79.899 ON STATIN THERAPY: ICD-10-CM

## 2019-02-25 DIAGNOSIS — E78.5 DYSLIPIDEMIA: ICD-10-CM

## 2019-02-25 DIAGNOSIS — I10 ESSENTIAL HYPERTENSION: Chronic | ICD-10-CM

## 2019-02-25 DIAGNOSIS — Z13.31 SCREENING FOR DEPRESSION: ICD-10-CM

## 2019-02-25 DIAGNOSIS — Z00.00 MEDICARE ANNUAL WELLNESS VISIT, SUBSEQUENT: Primary | ICD-10-CM

## 2019-02-25 DIAGNOSIS — K52.9 INFLAMMATORY BOWEL DISEASE: ICD-10-CM

## 2019-02-25 DIAGNOSIS — Z13.39 SCREENING FOR ALCOHOLISM: ICD-10-CM

## 2019-02-25 DIAGNOSIS — Z13.1 SCREENING FOR DIABETES MELLITUS: ICD-10-CM

## 2019-02-25 LAB
ANION GAP SERPL CALC-SCNC: 11 MMOL/L
BILIRUB UR QL: NEGATIVE
BUN SERPL-MCNC: 11 MG/DL (ref 7–17)
CALCIUM SERPL-MCNC: 10 MG/DL (ref 8.4–10.2)
CHLORIDE SERPL-SCNC: 101 MMOL/L (ref 98–107)
CHOL/HDL RATIO,CHHD: 2 RATIO (ref 0–4)
CHOLEST SERPL-MCNC: 189 MG/DL (ref 0–200)
CK SERPL-CCNC: 53 U/L (ref 30–135)
CLARITY: CLEAR
CO2 SERPL-SCNC: 25 MMOL/L (ref 22–32)
COLOR UR: NORMAL
CREAT SERPL-MCNC: 0.5 MG/DL (ref 0.7–1.2)
GLUCOSE 24H UR-MRATE: NEGATIVE G/(24.H)
GLUCOSE SERPL-MCNC: 99 MG/DL (ref 65–105)
HDLC SERPL-MCNC: 97 MG/DL (ref 35–130)
HGB UR QL STRIP: NEGATIVE
KETONES UR QL STRIP.AUTO: NEGATIVE
LDL/HDL RATIO,LDHD: 1 RATIO
LDLC SERPL CALC-MCNC: 84 MG/DL (ref 0–130)
LEUKOCYTE ESTERASE: NEGATIVE
NITRITE UR QL STRIP.AUTO: NEGATIVE
PH UR STRIP: 7 [PH] (ref 5–7)
POTASSIUM SERPL-SCNC: 4.4 MMOL/L (ref 3.6–5)
PROT UR STRIP-MCNC: NEGATIVE MG/DL
SODIUM SERPL-SCNC: 137 MMOL/L (ref 137–145)
SP GR UR REFRACTOMETRY: 1.01 (ref 1–1.03)
TRIGL SERPL-MCNC: 40 MG/DL (ref 0–200)
UROBILINOGEN UR QL STRIP.AUTO: NEGATIVE
VLDLC SERPL CALC-MCNC: 8 MG/DL

## 2019-02-25 NOTE — PROGRESS NOTES
This is the Subsequent Medicare Annual Wellness Exam, performed 12 months or more after the Initial AWV or the last Subsequent AWV I have reviewed the patient's medical history in detail and updated the computerized patient record. History Past Medical History:  
Diagnosis Date  Acute lower UTI 1/18/2018  Annual physical exam 1/18/2018  Arrhythmia 2000 TACHYCARDIA RHYTHM x1 episode 2000  Arthritis   
 spinal stenosis; previous spine surgery  Cataract 1/18/2018  Chronic insomnia 1/18/2018  Chronic kidney disease 2009 STONES  Dyslipidemia 1/18/2018  EBT (electron beam tomography) abnormal 1/18/2018  Fibrocystic breast changes 1/18/2018  High cholesterol  HTN (hypertension) 1/18/2018  Inflammatory bowel disease 1/18/2018  Nephrolithiasis 1/18/2018  On statin therapy 1/18/2018  Post hysterectomy menopause 1/18/2018  Sciatica 1/18/2018  Visit for screening mammogram 1/18/2018 Past Surgical History:  
Procedure Laterality Date  HX CATARACT REMOVAL    
 HX GYN    
 hysterectomy 1980  HX TONSILLECTOMY  HX UROLOGICAL  2009 URETERAL STENT  
 NEUROLOGICAL PROCEDURE UNLISTED  6/11 Back; 2 buldging disc fused, bone removed from hip Current Outpatient Medications Medication Sig Dispense Refill  amLODIPine (NORVASC) 5 mg tablet TAKE ONE TABLET BY MOUTH ONE TIME DAILY 90 Tab 1  
 LORazepam (ATIVAN) 2 mg tablet Take 2 tablets at bedtime 180 Tab 0  
 rosuvastatin (CRESTOR) 10 mg tablet TAKE ONE TABLET BY MOUTH ONE TIME DAILY  90 Tab 2  
 methotrexate (RHEUMATREX) 2.5 mg tablet  folic acid (FOLVITE) 1 mg tablet Take  by mouth daily.  methotrexate 2.5 mg/mL soln Take  by mouth.  ERGOCALCIFEROL, VITAMIN D2, (VITAMIN D2 PO) Take  by mouth.  Cetirizine (ZYRTEC) 10 mg cap Take  by mouth.  multivitamin (MULTIPLE VITAMIN) tablet Take 1 Tab by mouth daily.  aspirin 81 mg tablet Take 81 mg by mouth.  diltiazem XR (DILACOR XR) 240 mg XR capsule Take  by mouth daily.  omega-3 fatty acids-vitamin e (FISH OIL) 1,000 mg Cap Take 1 Cap by mouth daily.  GLUCOSAMINE HCL/CHONDRO PAYNE A (GLUCOSAMINE-CHONDROITIN PO) Take 1 Tab by mouth daily. Allergies Allergen Reactions  Levaquin [Levofloxacin] Hives Family History Problem Relation Age of Onset  Heart Disease Father 4 VESSEL CABG  Alcohol abuse Maternal Grandmother  Heart Disease Maternal Grandmother MI  
 Alcohol abuse Maternal Grandfather  Asthma Paternal Grandmother Social History Tobacco Use  Smoking status: Never Smoker  Smokeless tobacco: Never Used Substance Use Topics  Alcohol use: Yes Alcohol/week: 6.0 oz Types: 12 Cans of beer per week Patient Active Problem List  
Diagnosis Code  Spinal stenosis, lumbar region, with neurogenic claudication M48.062  
 Hypertension I10  
 Nephrolithiasis N20.0  EBT (electron beam tomography) abnormal R94.39  
 Inflammatory bowel disease K52.9  Post hysterectomy menopause E89.40, Z90.710  
 On statin therapy Z79.899  Dyslipidemia E78.5  
 HTN (hypertension) I10  
 Annual physical exam Z00.00  Cataract H26.9  Visit for screening mammogram Z12.31  
 Chronic insomnia F51.04  
 Fibrocystic breast changes N60.19  Sciatica M54.30  Acute lower UTI N39.0  Essential hypertension I10 Depression Risk Factor Screening:  
 
3 most recent PHQ Screens 2/25/2019 Little interest or pleasure in doing things Not at all Feeling down, depressed, irritable, or hopeless Not at all Total Score PHQ 2 0 Alcohol Risk Factor Screening: You do not drink alcohol or very rarely. Functional Ability and Level of Safety:  
Hearing Loss Hearing is good. Activities of Daily Living The home contains: no safety equipment. Patient does total self care Fall Risk Fall Risk Assessment, last 12 mths 2/25/2019 Able to walk? Yes Fall in past 12 months? No  
 
 
Abuse Screen Patient is not abused Cognitive Screening Evaluation of Cognitive Function: 
Has your family/caregiver stated any concerns about your memory: no 
Normal 
 
Patient Care Team  
Patient Care Team: 
Henrik Valdovinos MD as PCP - General (Internal Medicine) Assessment/Plan Education and counseling provided: 
Are appropriate based on today's review and evaluation Diagnoses and all orders for this visit: 
 
1. Medicare annual wellness visit, subsequent 2. Essential hypertension -     METABOLIC PANEL, BASIC 
-     LIPID PANEL 
-     URINALYSIS W/O MICRO 3. Inflammatory bowel disease 4. On statin therapy 
-     CK 5. Dyslipidemia -     LIPID PANEL 6. Screening for alcoholism -     [place] ANNUAL ALCOHOL SCREEN 15 MIN 
 
7. Screening for depression 
-     [facility] Screening for diabetes mellitus Health Maintenance Due Topic Date Due  
 DTaP/Tdap/Td series (1 - Tdap) 03/11/1972  Shingrix Vaccine Age 50> (1 of 2) 03/11/2001  
 FOBT Q 1 YEAR AGE 50-75  03/11/2001  GLAUCOMA SCREENING Q2Y  03/11/2016  Bone Densitometry (Dexa) Screening  03/11/2016  Pneumococcal 65+ Low/Medium Risk (1 of 2 - PCV13) 03/11/2016  Influenza Age 5 to Adult  08/01/2018  MEDICARE YEARLY EXAM  01/23/2019 This note will not be viewable in [address] Thomas Junior is a 80 y.o. female and presents with Hypertension (6 month f/u) Abby Hidalgo Subjective: 
 
Mrs. Mariana Lam presents today for follow-up of multiple problems including hypertension, hyperlipidemia, and monitoring of statin therapy. She is being treated for rheumatoid arthritis as well and has her labs done on a regular basis. She denies any shortness of breath, chest pain, palpitations, PND, orthopnea, or pedal edema. She continues to take Ativan as needed for chronic insomnia. Review of Systems Constitutional: negative for fevers, chills, anorexia and weight loss Eyes:   negative for visual disturbance and irritation ENT:   negative for tinnitus,sore throat,nasal congestion,ear pains. hoarseness Respiratory:  negative for cough, hemoptysis, dyspnea,wheezing CV:   negative for chest pain, palpitations, lower extremity edema GI:   negative for nausea, vomiting, diarrhea, abdominal pain,melena Endo:               negative for polyuria,polydipsia,polyphagia,heat intolerance Genitourinary: negative for frequency, dysuria and hematuria Integumentary: negative for rash and pruritus Hematologic:  negative for easy bruising and gum/nose bleeding Musculoskel: negative for myalgias, arthralgias, back pain, muscle weakness, joint pain Neurological:  negative for headaches, dizziness, vertigo, memory problems and gait Behavl/Psych: negative for feelings of anxiety, depression, mood changes Past Medical History:  
Diagnosis Date  Acute lower UTI 1/18/2018  Annual physical exam 1/18/2018  Arrhythmia 2000 TACHYCARDIA RHYTHM x1 episode 2000  Arthritis   
 spinal stenosis; previous spine surgery  Cataract 1/18/2018  Chronic insomnia 1/18/2018  Chronic kidney disease 2009 STONES  Dyslipidemia 1/18/2018  EBT (electron beam tomography) abnormal 1/18/2018  Fibrocystic breast changes 1/18/2018  High cholesterol  HTN (hypertension) 1/18/2018  Inflammatory bowel disease 1/18/2018  Nephrolithiasis 1/18/2018  On statin therapy 1/18/2018  Post hysterectomy menopause 1/18/2018  Sciatica 1/18/2018  Visit for screening mammogram 1/18/2018 Past Surgical History:  
Procedure Laterality Date  HX CATARACT REMOVAL    
 HX GYN    
 hysterectomy 1980  HX TONSILLECTOMY  HX UROLOGICAL  2009 URETERAL STENT  
 NEUROLOGICAL PROCEDURE UNLISTED  6/11 Back; 2 buldging disc fused, bone removed from hip Social History Socioeconomic History  Marital status:  Spouse name: Not on file  Number of children: Not on file  Years of education: Not on file  Highest education level: Not on file Tobacco Use  Smoking status: Never Smoker  Smokeless tobacco: Never Used Substance and Sexual Activity  Alcohol use: Yes Alcohol/week: 6.0 oz Types: 12 Cans of beer per week  Drug use: Yes Types: Prescription Family History Problem Relation Age of Onset  Heart Disease Father 4 VESSEL CABG  Alcohol abuse Maternal Grandmother  Heart Disease Maternal Grandmother MI  
 Alcohol abuse Maternal Grandfather  Asthma Paternal Grandmother Current Outpatient Medications Medication Sig Dispense Refill  amLODIPine (NORVASC) 5 mg tablet TAKE ONE TABLET BY MOUTH ONE TIME DAILY 90 Tab 1  
 LORazepam (ATIVAN) 2 mg tablet Take 2 tablets at bedtime 180 Tab 0  
 rosuvastatin (CRESTOR) 10 mg tablet TAKE ONE TABLET BY MOUTH ONE TIME DAILY  90 Tab 2  
 methotrexate (RHEUMATREX) 2.5 mg tablet  folic acid (FOLVITE) 1 mg tablet Take  by mouth daily.  methotrexate 2.5 mg/mL soln Take  by mouth.  ERGOCALCIFEROL, VITAMIN D2, (VITAMIN D2 PO) Take  by mouth.  Cetirizine (ZYRTEC) 10 mg cap Take  by mouth.  multivitamin (MULTIPLE VITAMIN) tablet Take 1 Tab by mouth daily.  aspirin 81 mg tablet Take 81 mg by mouth.  diltiazem XR (DILACOR XR) 240 mg XR capsule Take  by mouth daily.  omega-3 fatty acids-vitamin e (FISH OIL) 1,000 mg Cap Take 1 Cap by mouth daily.  GLUCOSAMINE HCL/CHONDRO PAYNE A (GLUCOSAMINE-CHONDROITIN PO) Take 1 Tab by mouth daily. Allergies Allergen Reactions  Levaquin [Levofloxacin] Hives Objective: 
Visit Vitals /78 (BP 1 Location: Left arm, BP Patient Position: Sitting) Pulse 68 Temp 97.5 °F (36.4 °C) (Oral) Resp 16 Ht 5' 4\" (1.626 m) Wt 156 lb (70.8 kg) SpO2 98% BMI 26.78 kg/m² Physical Exam: General appearance - alert, well appearing, and in no distress Mental status - alert, oriented to person, place, and time EYE-ABDIAZIZ, EOMI, fundi normal, corneas normal, no foreign bodies ENT-ENT exam normal, no neck nodes or sinus tenderness Nose - normal and patent, no erythema, discharge or polyps Mouth - mucous membranes moist, pharynx normal without lesions Neck - supple, no significant adenopathy Chest - clear to auscultation, no wheezes, rales or rhonchi, symmetric air entry Heart - normal rate, regular rhythm, normal S1, S2, no murmurs, rubs, clicks or gallops Abdomen - soft, nontender, nondistended, no masses or organomegaly Lymph- no adenopathy palpable Ext-peripheral pulses normal, no pedal edema, no clubbing or cyanosis Skin-Warm and dry. no hyperpigmentation, vitiligo, or suspicious lesions Neuro -alert, oriented, normal speech, no focal findings or movement disorder noted Musculoskeletal- FROM, no bony abnormalities, no point tenderness Breast - normal, no masses Pelvic - normal external genitalia, no inguinal hernia, normal rectal tone, no blood per rectum, no hemorrhoids. No results found for this visit on 02/25/19. All results for lab orders may not have been returned by the time this encountered was closed. Assessment/Plan: 
 
Orders Placed This Encounter  Depression Screen Annual  
 METABOLIC PANEL, BASIC ([facility])  LIPID PANEL ([facility])  CK ([facility])  URINALYSIS W/O MICRO ([facility])  Annual  Alcohol Screen 15 min () Problem List Items Addressed This Visit Hypertension (Chronic) Relevant Orders METABOLIC PANEL, BASIC  
 LIPID PANEL  
 URINALYSIS W/O MICRO Inflammatory bowel disease On statin therapy Relevant Orders CK Dyslipidemia Relevant Orders LIPID PANEL Other Visit Diagnoses Medicare annual wellness visit, subsequent    -  Primary Screening for alcoholism Relevant Orders [place] ANNUAL ALCOHOL SCREEN 15 MIN Screening for depression Relevant Orders Emilie Torres Screening for diabetes mellitus Plan: 
 
Continue current medical regimen as outlined above. Further recommendations based on labs. Patient had her mammogram done last year. Follow-up bone density and colonoscopy to be done at appropriate intervals. Patient Instructions Medicare Wellness Visit, Female The best way to live healthy is to have a lifestyle where you eat a well-balanced diet, exercise regularly, limit alcohol use, and quit all forms of tobacco/nicotine, if applicable. Regular preventive services are another way to keep healthy. Preventive services (vaccines, screening tests, monitoring & exams) can help personalize your care plan, which helps you manage your own care. Screening tests can find health problems at the earliest stages, when they are easiest to treat. [facility] follows the current, evidence-based guidelines published by the [facility] Essie (USPSTF) when recommending preventive services for our patients. Because we follow these guidelines, sometimes recommendations change over time as research supports it. (For example, mammograms used to be recommended annually. Even though Medicare will still pay for an annual mammogram, the newer guidelines recommend a mammogram every two years for women of average risk.) Of course, you and your doctor may decide to screen more often for some diseases, based on your risk and your health status. Preventive services for you include: - Medicare offers their members a free annual wellness visit, which is time for you and your primary care provider to discuss and plan for your preventive service needs.  Take advantage of this benefit every year! 
-All adults over the age of 72 should receive the recommended pneumonia vaccines. Current USPSTF guidelines recommend a series of two vaccines for the best pneumonia protection.  
-All adults should have a flu vaccine yearly and a tetanus vaccine every 10 years. All adults age 61 and older should receive a shingles vaccine once in their lifetime.   
-A bone mass density test is recommended when a woman turns 65 to screen for osteoporosis. This test is only recommended one time, as a screening. Some providers will use this same test as a disease monitoring tool if you already have osteoporosis. -All adults age 38-68 who are overweight should have a diabetes screening test once every three years.  
-Other screening tests and preventive services for persons with diabetes include: an eye exam to screen for diabetic retinopathy, a kidney function test, a foot exam, and stricter control over your cholesterol.  
-Cardiovascular screening for adults with routine risk involves an electrocardiogram (ECG) at intervals determined by your doctor.  
-Colorectal cancer screenings should be done for adults age 54-65 with no increased risk factors for colorectal cancer. There are a number of acceptable methods of screening for this type of cancer. Each test has its own benefits and drawbacks. Discuss with your doctor what is most appropriate for you during your annual wellness visit. The different tests include: colonoscopy (considered the best screening method), a fecal occult blood test, a fecal DNA test, and sigmoidoscopy. -Breast cancer screenings are recommended every other year for women of normal risk, age 54-69. 
-Cervical cancer screenings for women over age 72 are only recommended with certain risk factors.  
-All adults born between [facility] should be screened once for Hepatitis C. Here is a list of your current Health Maintenance items (your personalized list of preventive services) with a due date: 
Health Maintenance Due Topic Date Due  
  DTaP/Tdap/Td  (1 - Tdap) 03/11/1972  Shingles Vaccine (1 of 2) 03/11/2001  Stool testing for trace blood  03/11/2001  Glaucoma Screening   03/11/2016  Bone Mineral Density   03/11/2016  Pneumococcal Vaccine (1 of 2 - PCV13) 03/11/2016  Flu Vaccine  08/01/2018 [facility] Annual Well Visit  01/23/2019 All Medicare beneficiaries aged 48 and older are covered; however, when a beneficiary is at high risk, there is no minimum age required to receive a screening colonoscopy or a barium enema rendered as an alternative to a screening colonoscopy. The following are the coverage criteria for each colorectal cancer screening test/procedure. Screening FOBT Medicare provides coverage of a screening FOBT annually (i.e., at least 11 months have passed following the month in which the last covered screening FOBT was performed) for beneficiaries aged 48 and older. This screening requires a written order from the beneficiarys attending physician. NOTE: Medicare will only provide coverage for one FOBT per year: either HCPCS code  or CPT code 18299, but not both. Screening Colonoscopy For Beneficiaries at [address] [facility] for Developing Colorectal Cancer Medicare provides coverage of a screening colonoscopy (HCPCS code ) once every 2 years for beneficiaries at high risk for developing colorectal cancer (i.e., at least 23 months have passed following the month in which the last covered screening colonoscopy [HCPCS code ] was performed). For Beneficiaries Not at [address] [facility] for Developing Colorectal Cancer Medicare provides coverage of a screening colonoscopy (HCPCS code ) for beneficiaries who do not meet the criteria for being at high risk for developing colorectal cancer once every 10 years (i.e., at least 119 months have passed following the month in which the last covered screening colonoscopy [HCPCS code ] was performed).  If the beneficiary otherwise qualifies to have a covered screening colonoscopy (HCPCS code ) based on the above but has had a covered screening flexible sigmoidoscopy (HCPCS code ), then Medicare may cover a screening colonoscopy (HCPCS code ) only after at least 47 months have passed following the month in which the last covered screening flexible sigmoidoscopy (HCPCS code ) was performed. Follow-up Disposition: 
Return in about 6 months (around 8/25/2019) for follow up. I have reviewed with the patient details of the assessment and plan and all questions were answered. Relevent patient education was performed. The most recent lab findings were reviewed with the patient. An After Visit Summary was printed and given to the patient.  
 
 
Lisha Mock MD

## 2019-02-25 NOTE — PROGRESS NOTES
Ezequiel Brown is a 80 y.o. female Chief Complaint Patient presents with  Hypertension 6 month f/u 1. Have you been to the ER, urgent care clinic since your last visit? Hospitalized since your last visit? No 
 
2. Have you seen or consulted any other health care providers outside of the [address] [place] [address] since your last visit? Include any pap smears or colon screening. No  
 
Visit Vitals /78 (BP 1 Location: Left arm, BP Patient Position: Sitting) Pulse 68 Temp 97.5 °F (36.4 °C) (Oral) Resp 16 Ht 5' 4\" (1.626 m) Wt 156 lb (70.8 kg) SpO2 98% BMI 26.78 kg/m² Health Maintenance Due Topic Date Due  
 DTaP/Tdap/Td series (1 - Tdap) 03/11/1972  Shingrix Vaccine Age 50> (1 of 2) 03/11/2001  
 FOBT Q 1 YEAR AGE 50-75  03/11/2001  GLAUCOMA SCREENING Q2Y  03/11/2016  Bone Densitometry (Dexa) Screening  03/11/2016  Pneumococcal 65+ Low/Medium Risk (1 of 2 - PCV13) 03/11/2016  Influenza Age 5 to Adult  08/01/2018  MEDICARE YEARLY EXAM  01/23/2019 Jenna Anderson LPN

## 2019-02-25 NOTE — PATIENT INSTRUCTIONS
Medicare Wellness Visit, Female The best way to live healthy is to have a lifestyle where you eat a well-balanced diet, exercise regularly, limit alcohol use, and quit all forms of tobacco/nicotine, if applicable. Regular preventive services are another way to keep healthy. Preventive services (vaccines, screening tests, monitoring & exams) can help personalize your care plan, which helps you manage your own care. Screening tests can find health problems at the earliest stages, when they are easiest to treat. [facility] follows the current, evidence-based guidelines published by the [facility] ([facility]) when recommending preventive services for our patients. Because we follow these guidelines, sometimes recommendations change over time as research supports it. (For example, mammograms used to be recommended annually. Even though Medicare will still pay for an annual mammogram, the newer guidelines recommend a mammogram every two years for women of average risk.) Of course, you and your doctor may decide to screen more often for some diseases, based on your risk and your health status. Preventive services for you include: - Medicare offers their members a free annual wellness visit, which is time for you and your primary care provider to discuss and plan for your preventive service needs. Take advantage of this benefit every year! 
-All adults over the age of 72 should receive the recommended pneumonia vaccines. Current USPSTF guidelines recommend a series of two vaccines for the best pneumonia protection.  
-All adults should have a flu vaccine yearly and a tetanus vaccine every 10 years. All adults age 61 and older should receive a shingles vaccine once in their lifetime.   
-A bone mass density test is recommended when a woman turns 65 to screen for osteoporosis. This test is only recommended one time, as a screening. Some providers will use this same test as a disease monitoring tool if you already have osteoporosis. -All adults age 38-68 who are overweight should have a diabetes screening test once every three years.  
-Other screening tests and preventive services for persons with diabetes include: an eye exam to screen for diabetic retinopathy, a kidney function test, a foot exam, and stricter control over your cholesterol.  
-Cardiovascular screening for adults with routine risk involves an electrocardiogram (ECG) at intervals determined by your doctor.  
-Colorectal cancer screenings should be done for adults age 54-65 with no increased risk factors for colorectal cancer. There are a number of acceptable methods of screening for this type of cancer. Each test has its own benefits and drawbacks. Discuss with your doctor what is most appropriate for you during your annual wellness visit. The different tests include: colonoscopy (considered the best screening method), a fecal occult blood test, a fecal DNA test, and sigmoidoscopy. -Breast cancer screenings are recommended every other year for women of normal risk, age 54-69. 
-Cervical cancer screenings for women over age 72 are only recommended with certain risk factors.  
-All adults born between [facility] should be screened once for Hepatitis C. Here is a list of your current Health Maintenance items (your personalized list of preventive services) with a due date: 
Health Maintenance Due Topic Date Due  
 DTaP/Tdap/Td  (1 - Tdap) 03/11/1972  Shingles Vaccine (1 of 2) 03/11/2001  Stool testing for trace blood  03/11/2001  Glaucoma Screening   03/11/2016  Bone Mineral Density   03/11/2016  Pneumococcal Vaccine (1 of 2 - PCV13) 03/11/2016  Flu Vaccine  08/01/2018 Alan Annual Well Visit  01/23/2019 All Medicare beneficiaries aged 48 and older are covered; however, when a beneficiary is at high risk, there is no minimum age required to receive a screening colonoscopy or a barium enema rendered as an alternative to a screening colonoscopy. The following are the coverage criteria for each colorectal cancer screening test/procedure. Screening FOBT Medicare provides coverage of a screening FOBT annually (i.e., at least 11 months have passed following the month in which the last covered screening FOBT was performed) for beneficiaries aged 48 and older. This screening requires a written order from the beneficiarys attending physician. NOTE: Medicare will only provide coverage for one FOBT per year: either HCPCS code  or CPT code 96661, but not both. Screening Colonoscopy For Beneficiaries at [address] [facility] for Developing Colorectal Cancer Medicare provides coverage of a screening colonoscopy (HCPCS code ) once every 2 years for beneficiaries at high risk for developing colorectal cancer (i.e., at least 23 months have passed following the month in which the last covered screening colonoscopy [HCPCS code ] was performed). For Beneficiaries Not at [address] [facility] for Developing Colorectal Cancer Medicare provides coverage of a screening colonoscopy (HCPCS code ) for beneficiaries who do not meet the criteria for being at high risk for developing colorectal cancer once every 10 years (i.e., at least 119 months have passed following the month in which the last covered screening colonoscopy [HCPCS code ] was performed). If the beneficiary otherwise qualifies to have a covered screening colonoscopy (HCPCS code ) based on the above but has had a covered screening flexible sigmoidoscopy (HCPCS code ), then Medicare may cover a screening colonoscopy (HCPCS code ) only after at least 47 months have passed following the month in which the last covered screening flexible sigmoidoscopy (HCPCS code ) was performed.

## 2019-03-08 DIAGNOSIS — F41.1 GAD (GENERALIZED ANXIETY DISORDER): ICD-10-CM

## 2019-03-11 RX ORDER — LORAZEPAM 2 MG/1
TABLET ORAL
Qty: 180 TAB | Refills: 0 | Status: SHIPPED | OUTPATIENT
Start: 2019-03-11 | End: 2019-06-10 | Stop reason: SDUPTHER

## 2019-06-10 DIAGNOSIS — F41.1 GAD (GENERALIZED ANXIETY DISORDER): ICD-10-CM

## 2019-06-10 RX ORDER — LORAZEPAM 2 MG/1
TABLET ORAL
Qty: 180 TAB | Refills: 0 | Status: SHIPPED | OUTPATIENT
Start: 2019-06-10 | End: 2019-09-04 | Stop reason: SDUPTHER

## 2019-06-10 NOTE — TELEPHONE ENCOUNTER
Last refill:  3/11/19    PCP: Ramsey Armendariz MD    Last appt: 2/25/2019  Future Appointments   Date Time Provider Vishnu Lynn   8/27/2019 10:15 AM Ramsey Armendariz MD 3 Yoel Nguyen       Requested Prescriptions     Pending Prescriptions Disp Refills    LORazepam (ATIVAN) 2 mg tablet 180 Tab 0     Sig: TAKE TWO TABLETS BY MOUTH DAILY AT BEDTIME       Prior labs and Blood pressures:  BP Readings from Last 3 Encounters:   02/25/19 138/78   01/22/18 138/84   03/12/13 151/70     Lab Results   Component Value Date/Time    Sodium 137 02/25/2019 03:27 PM    Potassium 4.4 02/25/2019 03:27 PM    Chloride 101 02/25/2019 03:27 PM    CO2 25.0 02/25/2019 03:27 PM    Anion gap 11 02/25/2019 03:27 PM    Glucose 99 02/25/2019 03:27 PM    BUN 11.0 02/25/2019 03:27 PM    Creatinine 0.5 (L) 02/25/2019 03:27 PM    BUN/Creatinine ratio 7 (L) 06/07/2011 10:22 AM    GFR est AA >60 02/25/2019 03:27 PM    GFR est non-AA >60 02/25/2019 03:27 PM    Calcium 10.0 02/25/2019 03:27 PM     No results found for: HBA1C, HGBE8, GDO9JOGL, GYQ1YUMO  Lab Results   Component Value Date/Time    Cholesterol, total 189 02/25/2019 03:27 PM    Cholesterol (POC) 203 (A) 01/22/2018 03:46 AM    HDL Cholesterol 97 02/25/2019 03:27 PM    HDL Cholesterol (POC) 102 01/22/2018 03:46 AM    LDL Cholesterol (POC) 88.4 01/22/2018 03:46 AM    LDL, calculated 84 02/25/2019 03:27 PM    VLDL 8 02/25/2019 03:27 PM    Triglyceride 40 02/25/2019 03:27 PM    Triglycerides (POC) 63 01/22/2018 03:46 AM    CHOL/HDL Ratio 2 02/25/2019 03:27 PM     No results found for: [place], XQVID3, XQVID, VD3RIA    No results found for: TSH, TSH2, TSH3, TSHP, TSHEXT

## 2019-07-24 RX ORDER — AMLODIPINE BESYLATE 5 MG/1
TABLET ORAL
Qty: 90 TAB | Refills: 3 | Status: SHIPPED | OUTPATIENT
Start: 2019-07-24 | End: 2020-07-10

## 2019-09-04 DIAGNOSIS — F41.1 GAD (GENERALIZED ANXIETY DISORDER): ICD-10-CM

## 2019-09-04 RX ORDER — ROSUVASTATIN CALCIUM 10 MG/1
TABLET, COATED ORAL
Qty: 90 TAB | Refills: 1 | Status: SHIPPED | OUTPATIENT
Start: 2019-09-04 | End: 2020-02-25

## 2019-09-04 RX ORDER — LORAZEPAM 2 MG/1
TABLET ORAL
Qty: 60 TAB | Refills: 0 | Status: SHIPPED | OUTPATIENT
Start: 2019-09-04 | End: 2019-10-01 | Stop reason: SDUPTHER

## 2019-09-05 NOTE — TELEPHONE ENCOUNTER
Lorazepam prescription faxed to  [id number] - [place], [address]. Confirmation received.

## 2019-09-24 PROBLEM — Z12.31 VISIT FOR SCREENING MAMMOGRAM: Status: RESOLVED | Noted: 2018-01-18 | Resolved: 2019-09-24

## 2019-09-24 PROBLEM — Z00.00 ANNUAL PHYSICAL EXAM: Status: RESOLVED | Noted: 2018-01-18 | Resolved: 2019-09-24

## 2019-10-01 DIAGNOSIS — F41.1 GAD (GENERALIZED ANXIETY DISORDER): ICD-10-CM

## 2019-10-02 RX ORDER — LORAZEPAM 2 MG/1
TABLET ORAL
Qty: 60 TAB | Refills: 2 | Status: SHIPPED | OUTPATIENT
Start: 2019-10-02 | End: 2019-12-28

## 2019-10-02 NOTE — TELEPHONE ENCOUNTER
Lorazepam prescription faxed to  [id number] - [facility], [address]. Confirmation received.

## 2019-12-28 DIAGNOSIS — F41.1 GAD (GENERALIZED ANXIETY DISORDER): ICD-10-CM

## 2019-12-28 RX ORDER — LORAZEPAM 2 MG/1
TABLET ORAL
Qty: 60 TAB | Refills: 1 | Status: SHIPPED | OUTPATIENT
Start: 2019-12-28 | End: 2020-02-25

## 2020-02-24 DIAGNOSIS — F41.1 GAD (GENERALIZED ANXIETY DISORDER): ICD-10-CM

## 2020-02-25 RX ORDER — LORAZEPAM 2 MG/1
TABLET ORAL
Qty: 60 TAB | Refills: 0 | Status: SHIPPED | OUTPATIENT
Start: 2020-02-25 | End: 2020-03-27

## 2020-02-25 RX ORDER — ROSUVASTATIN CALCIUM 10 MG/1
TABLET, COATED ORAL
Qty: 90 TAB | Refills: 0 | Status: SHIPPED | OUTPATIENT
Start: 2020-02-25 | End: 2020-05-27

## 2020-03-27 DIAGNOSIS — F41.1 GAD (GENERALIZED ANXIETY DISORDER): ICD-10-CM

## 2020-03-27 RX ORDER — LORAZEPAM 2 MG/1
TABLET ORAL
Qty: 60 TAB | Refills: 0 | Status: SHIPPED | OUTPATIENT
Start: 2020-03-27 | End: 2020-04-27

## 2020-04-26 DIAGNOSIS — F41.1 GAD (GENERALIZED ANXIETY DISORDER): ICD-10-CM

## 2020-04-27 RX ORDER — LORAZEPAM 2 MG/1
TABLET ORAL
Qty: 60 TAB | Refills: 0 | Status: SHIPPED | OUTPATIENT
Start: 2020-04-27 | End: 2020-05-27

## 2020-05-26 DIAGNOSIS — F41.1 GAD (GENERALIZED ANXIETY DISORDER): ICD-10-CM

## 2020-05-27 RX ORDER — LORAZEPAM 2 MG/1
TABLET ORAL
Qty: 60 TAB | Refills: 0 | Status: SHIPPED | OUTPATIENT
Start: 2020-05-27 | End: 2020-06-25

## 2020-05-27 RX ORDER — ROSUVASTATIN CALCIUM 10 MG/1
TABLET, COATED ORAL
Qty: 90 TAB | Refills: 0 | Status: SHIPPED | OUTPATIENT
Start: 2020-05-27 | End: 2020-06-01

## 2020-06-01 RX ORDER — ROSUVASTATIN CALCIUM 10 MG/1
TABLET, COATED ORAL
Qty: 90 TAB | Refills: 0 | Status: SHIPPED | OUTPATIENT
Start: 2020-06-01 | End: 2020-11-18

## 2020-06-25 DIAGNOSIS — F41.1 GAD (GENERALIZED ANXIETY DISORDER): ICD-10-CM

## 2020-06-25 RX ORDER — LORAZEPAM 2 MG/1
TABLET ORAL
Qty: 60 TAB | Refills: 0 | Status: SHIPPED | OUTPATIENT
Start: 2020-06-25 | End: 2020-07-27

## 2020-07-27 DIAGNOSIS — F41.1 GAD (GENERALIZED ANXIETY DISORDER): ICD-10-CM

## 2020-07-27 RX ORDER — LORAZEPAM 2 MG/1
TABLET ORAL
Qty: 60 TAB | Refills: 0 | Status: SHIPPED | OUTPATIENT
Start: 2020-07-27 | End: 2020-08-25

## 2020-08-25 DIAGNOSIS — F41.1 GAD (GENERALIZED ANXIETY DISORDER): ICD-10-CM

## 2020-08-25 RX ORDER — LORAZEPAM 2 MG/1
TABLET ORAL
Qty: 60 TAB | Refills: 0 | Status: SHIPPED | OUTPATIENT
Start: 2020-08-25 | End: 2020-09-23

## 2020-09-23 DIAGNOSIS — F41.1 GAD (GENERALIZED ANXIETY DISORDER): ICD-10-CM

## 2020-09-23 RX ORDER — LORAZEPAM 2 MG/1
TABLET ORAL
Qty: 60 TAB | Refills: 0 | Status: SHIPPED | OUTPATIENT
Start: 2020-09-23 | End: 2020-10-25

## 2020-10-08 ENCOUNTER — OFFICE VISIT (OUTPATIENT)
Dept: INTERNAL MEDICINE CLINIC | Age: 70
End: 2020-10-08
Payer: MEDICARE

## 2020-10-08 VITALS
BODY MASS INDEX: 26.98 KG/M2 | DIASTOLIC BLOOD PRESSURE: 78 MMHG | HEART RATE: 88 BPM | WEIGHT: 158 LBS | SYSTOLIC BLOOD PRESSURE: 130 MMHG | RESPIRATION RATE: 16 BRPM | OXYGEN SATURATION: 98 % | TEMPERATURE: 97.8 F | HEIGHT: 64 IN

## 2020-10-08 DIAGNOSIS — Z78.0 POSTMENOPAUSAL STATE: ICD-10-CM

## 2020-10-08 DIAGNOSIS — Z79.899 ON STATIN THERAPY: ICD-10-CM

## 2020-10-08 DIAGNOSIS — Z13.31 SCREENING FOR DEPRESSION: ICD-10-CM

## 2020-10-08 DIAGNOSIS — Z13.1 SCREENING FOR DIABETES MELLITUS: ICD-10-CM

## 2020-10-08 DIAGNOSIS — F51.04 CHRONIC INSOMNIA: ICD-10-CM

## 2020-10-08 DIAGNOSIS — Z13.6 SCREENING FOR ISCHEMIC HEART DISEASE: ICD-10-CM

## 2020-10-08 DIAGNOSIS — Z00.00 MEDICARE ANNUAL WELLNESS VISIT, SUBSEQUENT: Primary | ICD-10-CM

## 2020-10-08 DIAGNOSIS — E78.5 DYSLIPIDEMIA: ICD-10-CM

## 2020-10-08 DIAGNOSIS — N20.0 NEPHROLITHIASIS: ICD-10-CM

## 2020-10-08 DIAGNOSIS — M05.79 RHEUMATOID ARTHRITIS INVOLVING MULTIPLE SITES WITH POSITIVE RHEUMATOID FACTOR (HCC): ICD-10-CM

## 2020-10-08 DIAGNOSIS — I10 ESSENTIAL HYPERTENSION: Chronic | ICD-10-CM

## 2020-10-08 DIAGNOSIS — Z12.31 ENCOUNTER FOR SCREENING MAMMOGRAM FOR MALIGNANT NEOPLASM OF BREAST: ICD-10-CM

## 2020-10-08 PROBLEM — M06.9 RA (RHEUMATOID ARTHRITIS) (HCC): Status: ACTIVE | Noted: 2020-10-08

## 2020-10-08 LAB
A-G RATIO,AGRAT: 1.5 RATIO
ALBUMIN SERPL-MCNC: 4.9 G/DL (ref 3.9–5.4)
ALP SERPL-CCNC: 108 U/L (ref 38–126)
ALT SERPL-CCNC: 54 U/L (ref 0–35)
ANION GAP SERPL CALC-SCNC: 10 MMOL/L
AST SERPL W P-5'-P-CCNC: 40 U/L (ref 14–36)
BILIRUB SERPL-MCNC: 0.9 MG/DL (ref 0.2–1.3)
BILIRUB UR QL: NEGATIVE
BUN SERPL-MCNC: 11 MG/DL (ref 7–17)
BUN/CREATININE RATIO,BUCR: 18 RATIO
CALCIUM SERPL-MCNC: 9.8 MG/DL (ref 8.4–10.2)
CHLORIDE SERPL-SCNC: 100 MMOL/L (ref 98–107)
CHOL/HDL RATIO,CHHD: 2 RATIO (ref 0–4)
CHOLEST SERPL-MCNC: 225 MG/DL (ref 0–200)
CK SERPL-CCNC: 73 U/L (ref 30–135)
CLARITY: CLEAR
CO2 SERPL-SCNC: 25 MMOL/L (ref 22–32)
COLOR UR: NORMAL
CREAT SERPL-MCNC: 0.6 MG/DL (ref 0.7–1.2)
GLOBULIN,GLOB: 3.3
GLUCOSE 24H UR-MRATE: NEGATIVE G/(24.H)
GLUCOSE SERPL-MCNC: 106 MG/DL (ref 65–105)
HDLC SERPL-MCNC: 104 MG/DL (ref 35–130)
HGB UR QL STRIP: NEGATIVE
KETONES UR QL STRIP.AUTO: NEGATIVE
LDL/HDL RATIO,LDHD: 1 RATIO
LDLC SERPL CALC-MCNC: 96 MG/DL (ref 0–130)
LEUKOCYTE ESTERASE: NEGATIVE
NITRITE UR QL STRIP.AUTO: NEGATIVE
PH UR STRIP: 7 [PH] (ref 5–7)
POTASSIUM SERPL-SCNC: 4.1 MMOL/L (ref 3.6–5)
PROT SERPL-MCNC: 8.2 G/DL (ref 6.3–8.2)
PROT UR STRIP-MCNC: NEGATIVE MG/DL
SODIUM SERPL-SCNC: 135 MMOL/L (ref 137–145)
SP GR UR REFRACTOMETRY: 1.01 (ref 1–1.03)
TRIGL SERPL-MCNC: 123 MG/DL (ref 0–200)
UROBILINOGEN UR QL STRIP.AUTO: NEGATIVE
VLDLC SERPL CALC-MCNC: 25 MG/DL

## 2020-10-08 PROCEDURE — 1090F PRES/ABSN URINE INCON ASSESS: CPT | Performed by: INTERNAL MEDICINE

## 2020-10-08 PROCEDURE — 82550 ASSAY OF CK (CPK): CPT | Performed by: INTERNAL MEDICINE

## 2020-10-08 PROCEDURE — G8419 CALC BMI OUT NRM PARAM NOF/U: HCPCS | Performed by: INTERNAL MEDICINE

## 2020-10-08 PROCEDURE — G8752 SYS BP LESS 140: HCPCS | Performed by: INTERNAL MEDICINE

## 2020-10-08 PROCEDURE — G8427 DOCREV CUR MEDS BY ELIG CLIN: HCPCS | Performed by: INTERNAL MEDICINE

## 2020-10-08 PROCEDURE — G0439 PPPS, SUBSEQ VISIT: HCPCS | Performed by: INTERNAL MEDICINE

## 2020-10-08 PROCEDURE — G8510 SCR DEP NEG, NO PLAN REQD: HCPCS | Performed by: INTERNAL MEDICINE

## 2020-10-08 PROCEDURE — G9899 SCRN MAM PERF RSLTS DOC: HCPCS | Performed by: INTERNAL MEDICINE

## 2020-10-08 PROCEDURE — 99214 OFFICE O/P EST MOD 30 MIN: CPT | Performed by: INTERNAL MEDICINE

## 2020-10-08 PROCEDURE — G8754 DIAS BP LESS 90: HCPCS | Performed by: INTERNAL MEDICINE

## 2020-10-08 PROCEDURE — 3017F COLORECTAL CA SCREEN DOC REV: CPT | Performed by: INTERNAL MEDICINE

## 2020-10-08 PROCEDURE — G8536 NO DOC ELDER MAL SCRN: HCPCS | Performed by: INTERNAL MEDICINE

## 2020-10-08 PROCEDURE — 1101F PT FALLS ASSESS-DOCD LE1/YR: CPT | Performed by: INTERNAL MEDICINE

## 2020-10-08 PROCEDURE — G8400 PT W/DXA NO RESULTS DOC: HCPCS | Performed by: INTERNAL MEDICINE

## 2020-10-08 PROCEDURE — 81003 URINALYSIS AUTO W/O SCOPE: CPT | Performed by: INTERNAL MEDICINE

## 2020-10-08 PROCEDURE — 80053 COMPREHEN METABOLIC PANEL: CPT | Performed by: INTERNAL MEDICINE

## 2020-10-08 PROCEDURE — 80061 LIPID PANEL: CPT | Performed by: INTERNAL MEDICINE

## 2020-10-08 NOTE — PATIENT INSTRUCTIONS
Medicare Wellness Visit, Female The best way to live healthy is to have a lifestyle where you eat a well-balanced diet, exercise regularly, limit alcohol use, and quit all forms of tobacco/nicotine, if applicable. Regular preventive services are another way to keep healthy. Preventive services (vaccines, screening tests, monitoring & exams) can help personalize your care plan, which helps you manage your own care. Screening tests can find health problems at the earliest stages, when they are easiest to treat. [facility] follows the current, evidence-based guidelines published by the [facility] ([facility]) when recommending preventive services for our patients. Because we follow these guidelines, sometimes recommendations change over time as research supports it. (For example, mammograms used to be recommended annually. Even though Medicare will still pay for an annual mammogram, the newer guidelines recommend a mammogram every two years for women of average risk). Of course, you and your doctor may decide to screen more often for some diseases, based on your risk and your co-morbidities (chronic disease you are already diagnosed with). Preventive services for you include: - Medicare offers their members a free annual wellness visit, which is time for you and your primary care provider to discuss and plan for your preventive service needs. Take advantage of this benefit every year! 
-All adults over the age of 72 should receive the recommended pneumonia vaccines. Current USPSTF guidelines recommend a series of two vaccines for the best pneumonia protection.  
-All adults should have a flu vaccine yearly and a tetanus vaccine every 10 years.  
-All adults age 48 and older should receive the shingles vaccines (series of two vaccines). -All adults age 38-68 who are overweight should have a diabetes screening test once every three years. -All adults born between [birth year] and 1964 should be screened once for Hepatitis C. 
-Other screening tests and preventive services for persons with diabetes include: an eye exam to screen for diabetic retinopathy, a kidney function test, a foot exam, and stricter control over your cholesterol.  
-Cardiovascular screening for adults with routine risk involves an electrocardiogram (ECG) at intervals determined by your doctor.  
-Colorectal cancer screenings should be done for adults age 54-65 with no increased risk factors for colorectal cancer. There are a number of acceptable methods of screening for this type of cancer. Each test has its own benefits and drawbacks. Discuss with your doctor what is most appropriate for you during your annual wellness visit. The different tests include: colonoscopy (considered the best screening method), a fecal occult blood test, a fecal DNA test, and sigmoidoscopy. 
 
-A bone mass density test is recommended when a woman turns 65 to screen for osteoporosis. This test is only recommended one time, as a screening. Some providers will use this same test as a disease monitoring tool if you already have osteoporosis. -Breast cancer screenings are recommended every other year for women of normal risk, age 54-69. 
-Cervical cancer screenings for women over age 72 are only recommended with certain risk factors. Here is a list of your current Health Maintenance items (your personalized list of preventive services) with a due date: 
Health Maintenance Due Topic Date Due  
 DTaP/Tdap/Td  (1 - Tdap) 03/11/1972  Shingles Vaccine (1 of 2) 03/11/2001  Glaucoma Screening   03/11/2016  Bone Mineral Density   03/11/2016  Pneumococcal Vaccine (1 of 1 - PPSV23) 03/11/2016  Mammogram  02/06/2020  Cholesterol Test   02/25/2020 [facility] Annual Well Visit  02/26/2020  Yearly Flu Vaccine (1) 09/01/2020

## 2020-10-08 NOTE — PROGRESS NOTES
This note will not be viewable in [address] Fenr Duarte is a 72 y.o. female and presents with Hypertension (follow up); Cholesterol Problem (follow up); and Arthritis (follow up)  . Subjective:      Mrs. Dottie Maciel presents today for a Medicare annual wellness review as well as follow-up of hypertension, hyperlipidemia, monitoring of statin therapy. She has a history of rheumatoid arthritis and remains on methotrexate. She is followed by rheumatology on a regular basis and has labs done every 3 months. She denies any shortness of breath, chest pain, palpitations, PND, orthopnea, or pedal edema. She denies any side effects from her medications. She had a Cologuard test done approximately 2 to 3 years ago which was negative. Her 28-year-old mother has dementia and is believed to have a colon cancer with a mass that was noted on imaging studies done a couple of years ago. She notes she had a nonfasting glucose at one time that was mildly elevated. All of her fasting glucose values have been normal.    Review of Systems  Constitutional:   Eyes:   negative for visual disturbance and irritation  ENT:   negative for tinnitus,sore throat,nasal congestion,ear pains. hoarseness  Respiratory:  negative for cough, hemoptysis, dyspnea,wheezing  CV:   negative for chest pain, palpitations, lower extremity edema  GI:   negative for nausea, vomiting, diarrhea, abdominal pain,melena  Endo:               negative for polyuria,polydipsia,polyphagia,heat intolerance  Genitourinary: negative for frequency, dysuria and hematuria  Integumentary: negative for rash and pruritus  Hematologic:  negative for easy bruising and gum/nose bleeding  Musculoskel: negative for myalgias, arthralgias, back pain, muscle weakness, joint pain  Neurological:  negative for headaches, dizziness, vertigo, memory problems and gait   Behavl/Psych: negative for feelings of anxiety, depression, mood changes    Past Medical History:   Diagnosis Date    Acute lower UTI 1/18/2018    Annual physical exam 1/18/2018    Arrhythmia 2000    TACHYCARDIA RHYTHM x1 episode 2000    Arthritis     spinal stenosis; previous spine surgery    Cataract 1/18/2018    Chronic insomnia 1/18/2018    Chronic kidney disease 2009    STONES    Dyslipidemia 1/18/2018    EBT (electron beam tomography) abnormal 1/18/2018    Fibrocystic breast changes 1/18/2018    High cholesterol     HTN (hypertension) 1/18/2018    Inflammatory bowel disease 1/18/2018    Nephrolithiasis 1/18/2018    On statin therapy 1/18/2018    Post hysterectomy menopause 1/18/2018    RA (rheumatoid arthritis) ([address] 10/8/2020    Sciatica 1/18/2018    Visit for screening mammogram 1/18/2018     Past Surgical History:   Procedure Laterality Date    HX CATARACT REMOVAL      HX GYN      hysterectomy 1980    HX TONSILLECTOMY      HX UROLOGICAL  2009    URETERAL STENT    NEUROLOGICAL PROCEDURE UNLISTED  6/11    Back; 2 buldging disc fused, bone removed from hip     Social History     Socioeconomic History    Marital status:      Spouse name: Not on file    Number of children: Not on file    Years of education: Not on file    Highest education level: Not on file   Tobacco Use    Smoking status: Never Smoker    Smokeless tobacco: Never Used   Substance and Sexual Activity    Alcohol use:  Yes     Alcohol/week: 10.0 standard drinks     Types: 12 Cans of beer per week    Drug use: Yes     Types: Prescription     Family History   Problem Relation Age of Onset    Heart Disease Father         4 VESSEL CABG    Alcohol abuse Maternal Grandmother     Heart Disease Maternal Grandmother         MI    Alcohol abuse Maternal Grandfather     Asthma Paternal Grandmother      Current Outpatient Medications   Medication Sig Dispense Refill    LORazepam (ATIVAN) 2 mg tablet TAKE TWO TABLETS BY MOUTH DAILY AT BEDTIME  60 Tab 0    amLODIPine (NORVASC) 5 mg tablet TAKE 1 TABLET BY MOUTH ONE TIME DAILY 90 Tab 1    rosuvastatin (CRESTOR) 10 mg tablet TAKE 1 TABLET BY MOUTH ONE TIME DAILY 90 Tab 0    methotrexate (RHEUMATREX) 2.5 mg tablet 20 mg every Monday.  folic acid (FOLVITE) 1 mg tablet Take  by mouth daily.  ERGOCALCIFEROL, VITAMIN D2, (VITAMIN D2 PO) Take  by mouth.  Cetirizine (ZYRTEC) 10 mg cap Take  by mouth.  multivitamin (MULTIPLE VITAMIN) tablet Take 1 Tab by mouth daily.  omega-3 fatty acids-vitamin e (FISH OIL) 1,000 mg Cap Take 1 Cap by mouth daily.  aspirin 81 mg tablet Take 81 mg by mouth.  methotrexate 2.5 mg/mL soln Take  by mouth. Allergies   Allergen Reactions    Levaquin [Levofloxacin] Hives       Objective:  Visit Vitals  /78 (BP 1 Location: Left arm, BP Patient Position: Sitting)   Pulse 88   Temp 97.8 °F (36.6 °C) (Oral)   Resp 16   Ht 5' 4\" (1.626 m)   Wt 158 lb (71.7 kg)   SpO2 98%   BMI 27.12 kg/m²     Physical Exam:   General appearance - alert, well appearing, and in no distress  Mental status - alert, oriented to person, place, and time  EYE-ABDIAZIZ, EOMI, fundi normal, corneas normal, no foreign bodies  ENT-ENT exam normal, no neck nodes or sinus tenderness  Nose - normal and patent, no erythema, discharge or polyps  Mouth - mucous membranes moist, pharynx normal without lesions  Neck - supple, no significant adenopathy   Chest - clear to auscultation, no wheezes, rales or rhonchi, symmetric air entry   Heart - normal rate, regular rhythm, normal S1, S2, no murmurs, rubs, clicks or gallops   Abdomen - soft, nontender, nondistended, no masses or organomegaly  Lymph- no adenopathy palpable  Ext-peripheral pulses normal, no pedal edema, no clubbing or cyanosis  Skin-Warm and dry.  no hyperpigmentation, vitiligo, or suspicious lesions  Neuro -alert, oriented, normal speech, no focal findings or movement disorder noted  Musculoskeletal- FROM, no bony abnormalities, no point tenderness    No results found for this visit on 10/08/20. All results for lab orders may not have been returned by the time this encountered was closed. Assessment/Plan:       ICD-10-CM ICD-9-CM    1. Medicare annual wellness visit, subsequent  Z00.00 V70.0    2. Essential hypertension  H19 080.8 METABOLIC PANEL, COMPREHENSIVE      URINALYSIS W/O MICRO   3. Nephrolithiasis  N20.0 592.0    4. On statin therapy  Z79.899 V58.69 LIPID PANEL      METABOLIC PANEL, COMPREHENSIVE      CK   5. Dyslipidemia  E78.5 272.4 LIPID PANEL      CK   6. Rheumatoid arthritis involving multiple sites with positive rheumatoid factor (HCC)  M05.79 714.0    7. Chronic insomnia  F51.04 780.52    8. Screening for depression  Z13.31 V79.0 [facility] 68   9. Screening for diabetes mellitus  Z13.1 V77.1    10. Screening for ischemic heart disease  Z13.6 V81.0    11. Postmenopausal state  Z78.0 V49.81 DEXA BONE DENSITY STUDY AXIAL   12. Encounter for screening mammogram for malignant neoplasm of breast  Z12.31 V76.12 JAMIN MAMMO BI SCREENING INCL CAD       Orders Placed This Encounter    Depression Screen Annual    Dexa Bone Density Study Axial ([id number])     Standing Status:   Future     Standing Expiration Date:   4/6/2021     Scheduling Instructions:      Schedule at [facility]     Order Specific Question:   Reason for Exam     Answer:   Screening    Bilateral Digital Screening Mammography     Standing Status:   Future     Standing Expiration Date:   4/6/2021     Order Specific Question:   Reason for Exam     Answer:   breast cancer screening    LIPID PANEL ([facility])    METABOLIC PANEL, COMPREHENSIVE ([facility])    CK ([facility])    URINALYSIS W/O MICRO ([facility])       Plan:    Continue current medical regimen as outlined above. Further recommendations based on labs as ordered. I have reviewed with the patient details of the assessment and plan and all questions were answered. Relevent patient education was performed.  Verbal and/or written instructions (see AVS) provided. The most recent lab findings were reviewed with the patient. Plan was discussed with patient who verbal expressed understanding. An After Visit Summary was printed and given to the patient. Tri Bowman MD    This is the Subsequent Medicare Annual Wellness Exam, performed 12 months or more after the Initial AWV or the last Subsequent AWV    I have reviewed the patient's medical history in detail and updated the computerized patient record.      History     Patient Active Problem List   Diagnosis Code    Spinal stenosis, lumbar region, with neurogenic claudication M48.062    Hypertension I10    Nephrolithiasis N20.0    EBT (electron beam tomography) abnormal R94.39    Inflammatory bowel disease K52.9    Post hysterectomy menopause E89.40, Z90.710    On statin therapy Z79.899    Dyslipidemia E78.5    HTN (hypertension) I10    Cataract H26.9    Chronic insomnia F51.04    Fibrocystic breast changes N60.19    Sciatica M54.30    Acute lower UTI N39.0    Essential hypertension I10    Rheumatoid arthritis involving multiple sites with positive rheumatoid factor (HCC) M05.79     Past Medical History:   Diagnosis Date    Acute lower UTI 1/18/2018    Annual physical exam 1/18/2018    Arrhythmia 2000    TACHYCARDIA RHYTHM x1 episode 2000    Arthritis     spinal stenosis; previous spine surgery    Cataract 1/18/2018    Chronic insomnia 1/18/2018    Chronic kidney disease 2009    STONES    Dyslipidemia 1/18/2018    EBT (electron beam tomography) abnormal 1/18/2018    Fibrocystic breast changes 1/18/2018    High cholesterol     HTN (hypertension) 1/18/2018    Inflammatory bowel disease 1/18/2018    Nephrolithiasis 1/18/2018    On statin therapy 1/18/2018    Post hysterectomy menopause 1/18/2018    RA (rheumatoid arthritis) ([facility] 10/8/2020    Sciatica 1/18/2018    Visit for screening mammogram 1/18/2018      Past Surgical History:   Procedure Laterality Date    HX CATARACT REMOVAL      HX GYN      hysterectomy 1980    HX TONSILLECTOMY      HX UROLOGICAL  2009    URETERAL STENT    NEUROLOGICAL PROCEDURE UNLISTED  6/11    Back; 2 buldging disc fused, bone removed from hip     Current Outpatient Medications   Medication Sig Dispense Refill    LORazepam (ATIVAN) 2 mg tablet TAKE TWO TABLETS BY MOUTH DAILY AT BEDTIME  60 Tab 0    amLODIPine (NORVASC) 5 mg tablet TAKE 1 TABLET BY MOUTH ONE TIME DAILY 90 Tab 1    rosuvastatin (CRESTOR) 10 mg tablet TAKE 1 TABLET BY MOUTH ONE TIME DAILY 90 Tab 0    methotrexate (RHEUMATREX) 2.5 mg tablet 20 mg every Monday.  folic acid (FOLVITE) 1 mg tablet Take  by mouth daily.  ERGOCALCIFEROL, VITAMIN D2, (VITAMIN D2 PO) Take  by mouth.  Cetirizine (ZYRTEC) 10 mg cap Take  by mouth.  multivitamin (MULTIPLE VITAMIN) tablet Take 1 Tab by mouth daily.  omega-3 fatty acids-vitamin e (FISH OIL) 1,000 mg Cap Take 1 Cap by mouth daily.  aspirin 81 mg tablet Take 81 mg by mouth.  methotrexate 2.5 mg/mL soln Take  by mouth. Allergies   Allergen Reactions    Levaquin [Levofloxacin] Hives       Family History   Problem Relation Age of Onset    Heart Disease Father         4 VESSEL CABG    Alcohol abuse Maternal Grandmother     Heart Disease Maternal Grandmother         MI    Alcohol abuse Maternal Grandfather     Asthma Paternal Grandmother      Social History     Tobacco Use    Smoking status: Never Smoker    Smokeless tobacco: Never Used   Substance Use Topics    Alcohol use:  Yes     Alcohol/week: 10.0 standard drinks     Types: 12 Cans of beer per week       Depression Risk Factor Screening:     3 most recent PHQ Screens 10/8/2020   Little interest or pleasure in doing things Not at all   Feeling down, depressed, irritable, or hopeless Not at all   Total Score PHQ 2 0       Alcohol Risk Screen   Do you average more than 1 drink per night or more than 7 drinks a week:  No    On any one occasion in the past three months have you have had more than 3 drinks containing alcohol:  No        Functional Ability and Level of Safety:   Hearing: Hearing is good. Activities of Daily Living: The home contains: no safety equipment. Patient does total self care     Ambulation: with no difficulty     Fall Risk:  Fall Risk Assessment, last 12 mths 10/8/2020   Able to walk? Yes   Fall in past 12 months? No     Abuse Screen:  Patient is not abused       Cognitive Screening   Has your family/caregiver stated any concerns about your memory: no     Cognitive Screening: Normal - Verbal Fluency Test    Patient Care Team   Patient Care Team:  Abhinav Garcia MD as PCP - General (Internal Medicine)  Abhinav Garcia MD as PCP - [facility]    Assessment/Plan   Education and counseling provided:  Are appropriate based on today's review and evaluation    Diagnoses and all orders for this visit:    1. Medicare annual wellness visit, subsequent    2. Essential hypertension  -     METABOLIC PANEL, COMPREHENSIVE  -     URINALYSIS W/O MICRO    3. Nephrolithiasis    4. On statin therapy  -     LIPID PANEL  -     METABOLIC PANEL, COMPREHENSIVE  -     CK    5. Dyslipidemia  -     LIPID PANEL  -     CK    6. Rheumatoid arthritis involving multiple sites with positive rheumatoid factor (HCC)    7. Chronic insomnia    8. Screening for depression  -     DEPRESSION SCREEN ANNUAL    9. Screening for diabetes mellitus    10. Screening for ischemic heart disease    11. Postmenopausal state  -     DEXA BONE DENSITY STUDY AXIAL; Future    12. Encounter for screening mammogram for malignant neoplasm of breast  -     JAMIN MAMMO BI SCREENING INCL CAD;  Future        Health Maintenance Due   Topic Date Due    DTaP/Tdap/Td series (1 - Tdap) 03/11/1972    Shingrix Vaccine Age 50> (1 of 2) 03/11/2001    GLAUCOMA SCREENING Q2Y  03/11/2016    Bone Densitometry (Dexa) Screening  03/11/2016    Pneumococcal 65+ years (1 of 1 - PPSV23) 03/11/2016    Breast Cancer Screen Mammogram  02/06/2020    Lipid Screen  02/25/2020    Medicare Yearly Exam  02/26/2020    Flu Vaccine (1) 09/01/2020

## 2020-10-08 NOTE — PROGRESS NOTES
Rupali Oconnor presents today at the clinic for    Chief Complaint   Patient presents with    Hypertension     follow up    Cholesterol Problem     follow up    Arthritis     follow up        Wt Readings from Last 3 Encounters:   10/08/20 158 lb (71.7 kg)   02/25/19 156 lb (70.8 kg)   01/22/18 157 lb (71.2 kg)     Temp Readings from Last 3 Encounters:   10/08/20 97.8 °F (36.6 °C) (Oral)   02/25/19 97.5 °F (36.4 °C) (Oral)   01/22/18 97.9 °F (36.6 °C) (Oral)     BP Readings from Last 3 Encounters:   10/08/20 (!) 148/88   02/25/19 138/78   01/22/18 138/84     Pulse Readings from Last 3 Encounters:   10/08/20 88   02/25/19 68   01/22/18 83       Health Maintenance Due   Topic    Colonoscopy     DTaP/Tdap/Td series (1 - Tdap)    Shingrix Vaccine Age 50> (1 of 2)    GLAUCOMA SCREENING Q2Y     Bone Densitometry (Dexa) Screening     Pneumococcal 65+ years (1 of 1 - PPSV23)    Breast Cancer Screen Mammogram     Lipid Screen     Medicare Yearly Exam     Flu Vaccine (1)         Learning Assessment:  :     Learning Assessment 2/25/2019 1/22/2018   PRIMARY LEARNER Patient Patient   HIGHEST LEVEL OF EDUCATION - PRIMARY LEARNER  - GRADUATED HIGH SCHOOL OR GED   BARRIERS PRIMARY LEARNER - NONE   CO-LEARNER CAREGIVER - No   PRIMARY LANGUAGE ENGLISH ENGLISH   LEARNER PREFERENCE PRIMARY DEMONSTRATION LISTENING   ANSWERED BY patient patient   RELATIONSHIP SELF SELF       Depression Screening:  :     3 most recent PHQ Screens 10/8/2020   Little interest or pleasure in doing things Not at all   Feeling down, depressed, irritable, or hopeless Not at all   Total Score PHQ 2 0       Fall Risk Assessment:  :     Fall Risk Assessment, last 12 mths 10/8/2020   Able to walk? Yes   Fall in past 12 months? No       Abuse Screening:  :     Abuse Screening Questionnaire 10/8/2020 2/25/2019 1/22/2018   Do you ever feel afraid of your partner? N N N   Are you in a relationship with someone who physically or mentally threatens you?  Natividad Ballesteros N   Is it safe for you to go home? Tahira Thompson       Coordination of Care Questionnaire:  :     1. Have you been to the ER, urgent care clinic since your last visit? Hospitalized since your last visit? no    2. Have you seen or consulted any other health care providers outside of the [address] [place] [address] since your last visit? Include any pap smears or colon screening. No    Patient refused flu shot today.

## 2020-10-24 DIAGNOSIS — F41.1 GAD (GENERALIZED ANXIETY DISORDER): ICD-10-CM

## 2020-10-25 RX ORDER — LORAZEPAM 2 MG/1
TABLET ORAL
Qty: 60 TAB | Refills: 0 | Status: SHIPPED | OUTPATIENT
Start: 2020-10-25 | End: 2020-10-26

## 2020-10-26 DIAGNOSIS — F41.1 GAD (GENERALIZED ANXIETY DISORDER): ICD-10-CM

## 2020-10-26 RX ORDER — LORAZEPAM 2 MG/1
TABLET ORAL
Qty: 60 TAB | Refills: 0 | Status: SHIPPED | OUTPATIENT
Start: 2020-10-26 | End: 2020-11-24 | Stop reason: SDUPTHER

## 2020-11-23 ENCOUNTER — TELEPHONE (OUTPATIENT)
Dept: INTERNAL MEDICINE CLINIC | Age: 70
End: 2020-11-23

## 2020-11-24 DIAGNOSIS — F41.1 GAD (GENERALIZED ANXIETY DISORDER): ICD-10-CM

## 2020-11-24 RX ORDER — LORAZEPAM 2 MG/1
TABLET ORAL
Qty: 60 TAB | Refills: 0 | Status: SHIPPED | OUTPATIENT
Start: 2020-11-24 | End: 2020-12-23

## 2020-11-24 NOTE — TELEPHONE ENCOUNTER
Anamaria Perales MD  You; Moy Long LPN 4 hours ago (4:47 AM)       Please notify patient prescription was sent to pharmacy per request.  Note placed on prescription that it may be filled 2 days early.     Message text

## 2020-12-22 DIAGNOSIS — F41.1 GAD (GENERALIZED ANXIETY DISORDER): ICD-10-CM

## 2020-12-23 DIAGNOSIS — F41.1 GAD (GENERALIZED ANXIETY DISORDER): ICD-10-CM

## 2020-12-23 RX ORDER — LORAZEPAM 2 MG/1
TABLET ORAL
Qty: 60 TAB | Refills: 0 | Status: SHIPPED | OUTPATIENT
Start: 2020-12-23 | End: 2020-12-23 | Stop reason: SDUPTHER

## 2020-12-23 RX ORDER — LORAZEPAM 2 MG/1
TABLET ORAL
Qty: 60 TAB | Refills: 0 | Status: CANCELLED | OUTPATIENT
Start: 2020-12-23

## 2020-12-23 RX ORDER — LORAZEPAM 2 MG/1
TABLET ORAL
Qty: 60 TAB | Refills: 0 | Status: SHIPPED | OUTPATIENT
Start: 2020-12-23 | End: 2021-03-19

## 2020-12-23 NOTE — TELEPHONE ENCOUNTER
Last Refill: 11/24/2020  Last Visit: 10/8/2020   Next Visit: Visit date not found    Requested Prescriptions     Pending Prescriptions Disp Refills    LORazepam (ATIVAN) 2 mg tablet 60 Tab 0     She needs this filled before the 24th due to being out on Friday and her pharmacy being closed. .    Please call [phone]    Thank you

## 2021-03-19 DIAGNOSIS — F41.1 GAD (GENERALIZED ANXIETY DISORDER): ICD-10-CM

## 2021-03-19 RX ORDER — LORAZEPAM 2 MG/1
TABLET ORAL
Qty: 60 TAB | Refills: 0 | Status: SHIPPED | OUTPATIENT
Start: 2021-03-19 | End: 2021-04-21

## 2021-04-20 DIAGNOSIS — F41.1 GAD (GENERALIZED ANXIETY DISORDER): ICD-10-CM

## 2021-04-21 RX ORDER — LORAZEPAM 2 MG/1
TABLET ORAL
Qty: 60 TAB | Refills: 0 | Status: SHIPPED | OUTPATIENT
Start: 2021-04-21 | End: 2021-05-14 | Stop reason: SDUPTHER

## 2021-05-14 DIAGNOSIS — F41.1 GAD (GENERALIZED ANXIETY DISORDER): ICD-10-CM

## 2021-05-14 RX ORDER — LORAZEPAM 2 MG/1
TABLET ORAL
Qty: 12 TAB | Refills: 0 | Status: SHIPPED | OUTPATIENT
Start: 2021-05-14 | End: 2021-05-18

## 2021-05-14 NOTE — TELEPHONE ENCOUNTER
Last Refill: 4/21/21  Last Visit: 10/8/20  Next Visit: 10/13/2021    Requested Prescriptions     Pending Prescriptions Disp Refills    LORazepam (ATIVAN) 2 mg tablet 12 Tab 0

## 2021-05-17 DIAGNOSIS — F41.1 GAD (GENERALIZED ANXIETY DISORDER): ICD-10-CM

## 2021-05-18 RX ORDER — LORAZEPAM 2 MG/1
TABLET ORAL
Qty: 60 TAB | Refills: 0 | Status: SHIPPED | OUTPATIENT
Start: 2021-05-18 | End: 2021-06-17

## 2021-08-03 PROBLEM — I10 HTN (HYPERTENSION): Status: RESOLVED | Noted: 2018-01-18 | Resolved: 2021-08-03

## 2021-08-26 LAB
SARS COV 2 AB (IGG) SPIKE, SEMI QN: POSITIVE
SARS-COV-2, NAA: NEGATIVE

## 2021-09-15 DIAGNOSIS — F41.1 GAD (GENERALIZED ANXIETY DISORDER): ICD-10-CM

## 2021-09-16 RX ORDER — LORAZEPAM 2 MG/1
TABLET ORAL
Qty: 60 TABLET | Refills: 0 | Status: SHIPPED | OUTPATIENT
Start: 2021-09-16 | End: 2021-10-14

## 2021-10-13 ENCOUNTER — OFFICE VISIT (OUTPATIENT)
Dept: INTERNAL MEDICINE CLINIC | Age: 71
End: 2021-10-13
Payer: MEDICARE

## 2021-10-13 VITALS
HEART RATE: 91 BPM | WEIGHT: 165 LBS | DIASTOLIC BLOOD PRESSURE: 78 MMHG | SYSTOLIC BLOOD PRESSURE: 138 MMHG | RESPIRATION RATE: 18 BRPM | OXYGEN SATURATION: 98 % | BODY MASS INDEX: 28.17 KG/M2 | HEIGHT: 64 IN

## 2021-10-13 DIAGNOSIS — Z13.1 SCREENING FOR DIABETES MELLITUS: ICD-10-CM

## 2021-10-13 DIAGNOSIS — Z12.11 COLON CANCER SCREENING: ICD-10-CM

## 2021-10-13 DIAGNOSIS — Z79.899 ON STATIN THERAPY: ICD-10-CM

## 2021-10-13 DIAGNOSIS — Z12.31 ENCOUNTER FOR SCREENING MAMMOGRAM FOR MALIGNANT NEOPLASM OF BREAST: ICD-10-CM

## 2021-10-13 DIAGNOSIS — Z00.00 MEDICARE ANNUAL WELLNESS VISIT, SUBSEQUENT: Primary | ICD-10-CM

## 2021-10-13 DIAGNOSIS — E78.5 DYSLIPIDEMIA: ICD-10-CM

## 2021-10-13 DIAGNOSIS — F51.04 CHRONIC INSOMNIA: ICD-10-CM

## 2021-10-13 DIAGNOSIS — M05.79 RHEUMATOID ARTHRITIS INVOLVING MULTIPLE SITES WITH POSITIVE RHEUMATOID FACTOR (HCC): ICD-10-CM

## 2021-10-13 DIAGNOSIS — Z13.6 SCREENING FOR ISCHEMIC HEART DISEASE: ICD-10-CM

## 2021-10-13 DIAGNOSIS — Z13.31 SCREENING FOR DEPRESSION: ICD-10-CM

## 2021-10-13 DIAGNOSIS — I10 ESSENTIAL HYPERTENSION: Chronic | ICD-10-CM

## 2021-10-13 LAB
ALBUMIN SERPL-MCNC: 4 G/DL (ref 3.5–5)
ALBUMIN/GLOB SERPL: 1 {RATIO} (ref 1.1–2.2)
ALP SERPL-CCNC: 109 U/L (ref 45–117)
ALT SERPL-CCNC: 61 U/L (ref 12–78)
ANION GAP SERPL CALC-SCNC: 4 MMOL/L (ref 5–15)
APPEARANCE UR: CLEAR
AST SERPL-CCNC: 36 U/L (ref 15–37)
BACTERIA URNS QL MICRO: NEGATIVE /HPF
BILIRUB SERPL-MCNC: 0.4 MG/DL (ref 0.2–1)
BILIRUB UR QL: NEGATIVE
BUN SERPL-MCNC: 12 MG/DL (ref 6–20)
BUN/CREAT SERPL: 19 (ref 12–20)
CALCIUM SERPL-MCNC: 9.2 MG/DL (ref 8.5–10.1)
CHLORIDE SERPL-SCNC: 104 MMOL/L (ref 97–108)
CHOLEST SERPL-MCNC: 231 MG/DL
CK SERPL-CCNC: 65 U/L (ref 26–192)
CO2 SERPL-SCNC: 28 MMOL/L (ref 21–32)
COLOR UR: ABNORMAL
CREAT SERPL-MCNC: 0.63 MG/DL (ref 0.55–1.02)
EPITH CASTS URNS QL MICRO: ABNORMAL /LPF
GLOBULIN SER CALC-MCNC: 4.1 G/DL (ref 2–4)
GLUCOSE SERPL-MCNC: 121 MG/DL (ref 65–100)
GLUCOSE UR STRIP.AUTO-MCNC: NEGATIVE MG/DL
HDLC SERPL-MCNC: 72 MG/DL
HDLC SERPL: 3.2 {RATIO} (ref 0–5)
HGB UR QL STRIP: NEGATIVE
HYALINE CASTS URNS QL MICRO: ABNORMAL /LPF (ref 0–5)
KETONES UR QL STRIP.AUTO: NEGATIVE MG/DL
LDLC SERPL CALC-MCNC: 134.4 MG/DL (ref 0–100)
LEUKOCYTE ESTERASE UR QL STRIP.AUTO: ABNORMAL
NITRITE UR QL STRIP.AUTO: NEGATIVE
PH UR STRIP: 7 [PH] (ref 5–8)
POTASSIUM SERPL-SCNC: 4.7 MMOL/L (ref 3.5–5.1)
PROT SERPL-MCNC: 8.1 G/DL (ref 6.4–8.2)
PROT UR STRIP-MCNC: NEGATIVE MG/DL
RBC #/AREA URNS HPF: ABNORMAL /HPF (ref 0–5)
SODIUM SERPL-SCNC: 136 MMOL/L (ref 136–145)
SP GR UR REFRACTOMETRY: 1.01 (ref 1–1.03)
TRIGL SERPL-MCNC: 123 MG/DL (ref ?–150)
UA: UC IF INDICATED,UAUC: ABNORMAL
UROBILINOGEN UR QL STRIP.AUTO: 0.2 EU/DL (ref 0.2–1)
VLDLC SERPL CALC-MCNC: 24.6 MG/DL
WBC URNS QL MICRO: ABNORMAL /HPF (ref 0–4)

## 2021-10-13 PROCEDURE — G8419 CALC BMI OUT NRM PARAM NOF/U: HCPCS | Performed by: INTERNAL MEDICINE

## 2021-10-13 PROCEDURE — G8510 SCR DEP NEG, NO PLAN REQD: HCPCS | Performed by: INTERNAL MEDICINE

## 2021-10-13 PROCEDURE — 1101F PT FALLS ASSESS-DOCD LE1/YR: CPT | Performed by: INTERNAL MEDICINE

## 2021-10-13 PROCEDURE — 3017F COLORECTAL CA SCREEN DOC REV: CPT | Performed by: INTERNAL MEDICINE

## 2021-10-13 PROCEDURE — G8536 NO DOC ELDER MAL SCRN: HCPCS | Performed by: INTERNAL MEDICINE

## 2021-10-13 PROCEDURE — G8427 DOCREV CUR MEDS BY ELIG CLIN: HCPCS | Performed by: INTERNAL MEDICINE

## 2021-10-13 PROCEDURE — G8399 PT W/DXA RESULTS DOCUMENT: HCPCS | Performed by: INTERNAL MEDICINE

## 2021-10-13 PROCEDURE — G9899 SCRN MAM PERF RSLTS DOC: HCPCS | Performed by: INTERNAL MEDICINE

## 2021-10-13 PROCEDURE — G8752 SYS BP LESS 140: HCPCS | Performed by: INTERNAL MEDICINE

## 2021-10-13 PROCEDURE — G0439 PPPS, SUBSEQ VISIT: HCPCS | Performed by: INTERNAL MEDICINE

## 2021-10-13 PROCEDURE — G8754 DIAS BP LESS 90: HCPCS | Performed by: INTERNAL MEDICINE

## 2021-10-13 NOTE — PROGRESS NOTES
1. Have you been to the ER, urgent care clinic since your last visit? Hospitalized since your last visit? No    2. Have you seen or consulted any other health care providers outside of the [address] [place] [address] since your last visit? Include any pap smears or colon screening.  No

## 2021-10-13 NOTE — LETTER
Name:Dorys Self   :1950   MR #:[id number]   Provider Tanmay Rubio MD   *[id number]*  BS[facility]-491 ()  Page 1 of 5 Initial [facility]ty [place]    CONTROLLED SUBSTANCE AGREEMENT    I may be prescribed medications that are controlled substances as part  of my treatment plan for management of my medical condition(s). The goal of my treatment plan is to maintain and/or improve my health and wellbeing. Because controlled substances have an increased risk of abuse or harm, continual re-evaluation is needed determine if the goals of my treatment plan are being met for my safety and the safety of others. Priti Nolasco  am entering into this Controlled Substance Agreement with my provider, Freddy Amaya MD at [address] [place] [address] . I understand that successful treatment requires mutual trust and honesty between me and my provider. I understand that there are state and federal laws and regulations which apply to the medications that my provider may prescribe that must be followed. I understand there are risks and benefits ts of taking the medicines that my provider may prescribe. I understand and agree that following this Agreement is necessary in continuing my provider-patient relationship and success of my treatment plan. As a part of my treatment plan, I agree to the following:    COMMUNICATION:    1. I will communicate fully with my provider about my medical condition(s), including the effect on my daily life and how well my medications are helping. I will tell my provider all of the medications that I take for any reason, including medications I receive from another health care provider, and will notify my provider about all issues, problems or concerns, including any side effects, which may be related to my medications. I understand that this information allows my provider to adjust my treatment plan to help manage my medical condition.  I understand that this information will become part of my permanent medical record. 2. I will notify my provider if I have a history of alcohol/drug misuse/addiction or if I have had treatment for alcohol/drug addiction in the past, or if I have a new problem with or concern about alcohol/drug use/addiction, because this increases the likelihood of high risk behaviors and may lead to serious medical conditions. 3. Females Only: I will notify my provider if I am or become pregnant, or if I intend to become pregnant, or if I intend to breastfeed. I understand that communication of these issues with my provider is important, due to possible effects my medication could have on an unborn fetus or breastfeeding child. Name:.Norma Rivera   :1950   MR #:[id number]   Provider Yarelis Solorio MD   *[id number]*  [facility]-491 ()  Page 2 of 5 Initial [facility]      MISUSE OF MEDICATIONS / DRUGS:    1. I agree to take all controlled substances as prescribed, and will not misuse or abuse any controlled substances prescribed by my provider. For my safety, I will not increase the amount of medicine I take without first talking with and getting permission from my provider. 2. If I have a medical emergency, another health care provider may prescribe me medication. If I seek emergency treatment, I will notify my provider within seventy-two (72) hours. 3. I understand that my provider may discuss my use and/or possible misuse/abuse of controlled substances and alcohol, as appropriate, with any health care provider involved in my care, pharmacist or legal authority. ILLEGAL DRUGS:    1. I will not use illegal drugs of any kind, including but not limited to marijuana, heroin, cocaine, or any prescription drug which is not prescribed to me. DRUG DIVERSION / PRESCRIPTION FRAUD:    1. I will not share, sell, trade, give away, or otherwise misuse my prescriptions or medications.     2. I will not alter any prescriptions provided to me by my provider. SINGLE PROVIDER:    1. I agree that all controlled substances that I take will be prescribed only by my provider (or his/her covering provider) under this Agreement. This agreement does not prevent me from seeking emergency medical treatment or receiving pain management related to a surgery. PROTECTING MEDICATIONS:    1. I am responsible for keeping my prescriptions and medications in a safe and secure place including safeguarding them from loss or theft. I understand that lost, stolen or damaged/destroyed prescriptions or medications will not be replaced. Name:.Norma Vazquez   :1950   MR #:[id number]   Provider Maria R Cosme MD   *[id number]*  BS[facility]-491 ()  Page 3 of 5 Initial [place]  PRESCRIPTION RENEWALS/REFILLS:    1. I will follow my controlled substance medication schedule as prescribed by my provider. 2. I understand and agree that I will make any requests for renewals or refills of my prescriptions only at the time of an office visit or during my providers regular office hours subject to the prescription refill requirements of the individual practice. 3. I understand that my provider may not call in prescriptions for controlled substances to my pharmacy. 4. I understand that my provider may adjust or discontinue these medications as deemed appropriate for my medical treatment plan. This Agreement does not guarantee the prescription of controlled medications. 5. I agree that if my medications are adjusted or discontinued, I will properly dispose of any remaining medications. I understand that I will be required to dispose of any remaining controlled medications prior to being provided with any prescriptions for other controlled medications.     6. I understand that the renewal of my prescription depends on my medical condition, my consistent participation, and my adherence with my treatment plan and this Agreement. 7. I understand that if I do not keep an appointment with my provider, I may not receive a renewal or refill for my controlled substance medication. PRESCRIPTION MONITORING / DRUG TESTIN. I understand that my provider may require me to provide urine, saliva or blood for testing at any time. I understand that this testing will be used to monitor for safety and adherence with my treatment plan and this Agreement. 2. I understand that my provider may ask me to provide an observed urine specimen, which means that a nurse or other health care provider may watch me provide urine, and I agree to cooperate if I am asked to provide an observed specimen. 3. I understand that if I do not provide urine, saliva or blood samples within two (2) hours of my providers request, or other timeframe decided by my provider, my treatment plan could be changed, or my prescriptions and medications may be changed or ended. 4. I understand that urine, saliva and blood test results will be a part of my permanent medical record. Name:.Norma Pires   :1950   MR #:[id number]   Provider Ousmane Ascencio MD   *[id number]*  BSMG-491 ()  Page 4 of 5 Initial [facility]    5. I understand that my provider is required to obtain a copy of my State Prescription Monitoring Program () Report at any time in order to safely prescribe medications. 6. I will bring all of my prescribed controlled substance medications in their original bottles to all of my scheduled appointments. 7. I understand that my provider may ask me to come to the practice with all of my prescribed medications for a random pill count at any time. I agree to cooperate if I am asked to come in for a random pill count. I understand that if I do not arrive in the timeframe decided by my provider, my treatment plan could be changed, or my prescriptions and medications may be changed or ended.     COOPERATION WITH INVESTIGATIONS:    1. I authorize my provider and my pharmacy to cooperate fully with any local, state, or federal law enforcement agency in the investigation of any possible misuse, sale, or other diversion of my controlled substance prescriptions or medications. RISKS:    1. I understand that my level of consciousness may be affected from the use of controlled substances, and I understand that there are risks, benefits, effects and potential alternatives (including no treatment) to the medications that my provider has prescribed. 2. I understand that I may become drowsy, tired, dizzy, constipated, and sick to my stomach, or have changes in my mood or in my sleep while taking my medications. I have talked with my provider about these possible side effects, risks, benefits, and alternative treatments, and my provider has answered all of my questions. 3. I understand that I should not suddenly stop taking my medications without first speaking with my provider. I understand that if I suddenly stop taking my medications, I may experience nausea, vomiting, sweating,anxiety, sleeplessness, itching or other uncomfortable feelings. 4. I will not take my medications with alcohol of any kind, including beer, wine or liquor. I understand that drinking alcohol with my medications increases the chances of side effects, including breathing problems or even death. 5. I understand that if I have a history of alcoholism or other drug addiction I may be at increased risk of addiction to my medications. Signs of addiction might include craving, compulsive use, and continued use despite harm. Since addiction is a disease, I understand my provider may decide to change my medications and refer me to appropriate treatment services. I understand that this information would become part of my permanent medical record.     Name:.Norma Petersenin   :1950   MR #:[id number]   Provider Chastity Santiago MD *[id number]*  BSMG-491 ()  Page 5 of 5 Initial [facility]      6. Females only: Children born to women who regularly take controlled substances are likely to have physical problems and suffer withdrawal symptoms at birth. If I am of child-bearing age, I understand that I should use safe and effective birth control while taking any controlled substances to avoid the impact of medications on an unborn fetus or  child. I agree to notify my provider immediately if I should become pregnant so that my treatment plan can be adjusted. 7. Males only: I understand that chronic use of controlled substances has been associated with low testosterone levels in males which may affect my mood, stamina, sexual desire, and general health. I understand that my provider may order the appropriate laboratory test to determine my testosterone level,and I agree to this testing. ADHERENCE:    1. I understand that if I do not adhere to this Agreement in any way, my provider may change my prescriptions, stop prescribing controlled substances or end our provider-patient relationship. 2. If my provider decides to stop prescribing medication, or decides to end our provider-patient relationship,my provider may require that I taper my medications slowly. If necessary, my provider may also provide a prescription for other medications to treat my withdrawal symptoms. UNDERSTANDING THIS AGREEMENT:    I understand that my provider may adjust or stop my prescriptions for controlled substances based on my medical condition and my treatment plan. I understand that this Agreement does not guarantee that I will be prescribed medications or controlled substances. I understand that controlled substances may be just one part  of my treatment plan.     My initial on each page and my signature below shows that I have read each page of this Agreement, I have had an opportunity to ask questions, and all of my questions have been answered to my satisfaction by my provider.     By signing below, I agree to comply with this Agreement, and I understand that if I do not follow the Agreements listed above, my provider may stop        _________________________________________  Date/Time 10/13/2021 9:21 AM                 (Patient Signature)

## 2021-10-13 NOTE — PATIENT INSTRUCTIONS
Medicare Wellness Visit, Female     The best way to live healthy is to have a lifestyle where you eat a well-balanced diet, exercise regularly, limit alcohol use, and quit all forms of tobacco/nicotine, if applicable. Regular preventive services are another way to keep healthy. Preventive services (vaccines, screening tests, monitoring & exams) can help personalize your care plan, which helps you manage your own care. Screening tests can find health problems at the earliest stages, when they are easiest to treat. [facility] follows the current, evidence-based guidelines published by the [facility] ([facility]) when recommending preventive services for our patients. Because we follow these guidelines, sometimes recommendations change over time as research supports it. (For example, mammograms used to be recommended annually. Even though Medicare will still pay for an annual mammogram, the newer guidelines recommend a mammogram every two years for women of average risk). Of course, you and your doctor may decide to screen more often for some diseases, based on your risk and your co-morbidities (chronic disease you are already diagnosed with). Preventive services for you include:  - Medicare offers their members a free annual wellness visit, which is time for you and your primary care provider to discuss and plan for your preventive service needs. Take advantage of this benefit every year!  -All adults over the age of 72 should receive the recommended pneumonia vaccines. Current USPSTF guidelines recommend a series of two vaccines for the best pneumonia protection.   -All adults should have a flu vaccine yearly and a tetanus vaccine every 10 years.   -All adults age 48 and older should receive the shingles vaccines (series of two vaccines).       -All adults age 38-68 who are overweight should have a diabetes screening test once every three years.   -All adults born between [birth year] and 1964 should be screened once for Hepatitis C.  -Other screening tests and preventive services for persons with diabetes include: an eye exam to screen for diabetic retinopathy, a kidney function test, a foot exam, and stricter control over your cholesterol.   -Cardiovascular screening for adults with routine risk involves an electrocardiogram (ECG) at intervals determined by your doctor.   -Colorectal cancer screenings should be done for adults age 54-65 with no increased risk factors for colorectal cancer. There are a number of acceptable methods of screening for this type of cancer. Each test has its own benefits and drawbacks. Discuss with your doctor what is most appropriate for you during your annual wellness visit. The different tests include: colonoscopy (considered the best screening method), a fecal occult blood test, a fecal DNA test, and sigmoidoscopy.    -A bone mass density test is recommended when a woman turns 65 to screen for osteoporosis. This test is only recommended one time, as a screening. Some providers will use this same test as a disease monitoring tool if you already have osteoporosis. -Breast cancer screenings are recommended every other year for women of normal risk, age 54-69.  -Cervical cancer screenings for women over age 72 are only recommended with certain risk factors.      Here is a list of your current Health Maintenance items (your personalized list of preventive services) with a due date:  Health Maintenance Due   Topic Date Due    COVID-19 Vaccine (1) Never done    DTaP/Tdap/Td  (1 - Tdap) Never done    Shingles Vaccine (1 of 2) Never done    Pneumococcal Vaccine (1 of 1 - PPSV23) Never done    Mammogram  02/06/2020    Colorectal Screening  02/14/2021    Yearly Flu Vaccine (1) Never done    Cholesterol Test   10/08/2021

## 2021-10-13 NOTE — PROGRESS NOTES
This is the Subsequent Medicare Annual Wellness Exam, performed 12 months or more after the Initial AWV or the last Subsequent AWV    I have reviewed the patient's medical history in detail and updated the computerized patient record. Assessment/Plan   Education and counseling provided:  Are appropriate based on today's review and evaluation    1. Medicare annual wellness visit, subsequent  2. Essential hypertension  -     METABOLIC PANEL, COMPREHENSIVE; Future  -     URINALYSIS W/ REFLEX CULTURE; Future  3. Rheumatoid arthritis involving multiple sites with positive rheumatoid factor ([facility] [address]  4. On statin therapy  -     CK; Future  -     LIPID PANEL; Future  5. Dyslipidemia  -     CK; Future  -     LIPID PANEL; Future  6. Chronic insomnia  7. Colon cancer screening  -     COLOGUARD TEST (FECAL DNA COLORECTAL CANCER SCREENING)  8. Screening for diabetes mellitus  9. Screening for ischemic heart disease  10. Screening for depression  -     DEPRESSION SCREEN ANNUAL  11. Encounter for screening mammogram for malignant neoplasm of breast  -     JAMIN MAMMO BI SCREENING INCL CAD; Future       Depression Risk Factor Screening     3 most recent PHQ Screens 10/13/2021   Little interest or pleasure in doing things Not at all   Feeling down, depressed, irritable, or hopeless Not at all   Total Score PHQ 2 0       Alcohol Risk Screen    Do you average more than 1 drink per night or more than 7 drinks a week:  No    On any one occasion in the past three months have you have had more than 3 drinks containing alcohol:  No        Functional Ability and Level of Safety    Hearing: Hearing is good. Activities of Daily Living: The home contains: no safety equipment. Patient does total self care      Ambulation: with no difficulty     Fall Risk:  Fall Risk Assessment, last 12 mths 10/8/2020   Able to walk? Yes   Fall in past 12 months?  No      Abuse Screen:  Patient is not abused       Cognitive Screening    Has your family/caregiver stated any concerns about your memory: no     Cognitive Screening: Normal - Verbal Fluency Test    Health Maintenance Due     Health Maintenance Due   Topic Date Due    COVID-19 Vaccine (1) Never done    DTaP/Tdap/Td series (1 - Tdap) Never done    Shingrix Vaccine Age 50> (1 of 2) Never done    Pneumococcal 65+ years (1 of 1 - PPSV23) Never done    Breast Cancer Screen Mammogram  02/06/2020    Colorectal Cancer Screening Combo  02/14/2021    Flu Vaccine (1) Never done    Lipid Screen  10/08/2021       Patient Care Team   Patient Care Team:  Rian Cobb MD as PCP - General (Internal Medicine)  Rian Cobb MD as PCP - REHABILITATION [facility] Empaneled Provider    History     Patient Active Problem List   Diagnosis Code    Spinal stenosis, lumbar region, with neurogenic claudication M48.062    Nephrolithiasis N20.0    EBT (electron beam tomography) abnormal R94.39    Inflammatory bowel disease K52.9    Post hysterectomy menopause E89.40, Z90.710    On statin therapy Z79.899    Dyslipidemia E78.5    Cataract H26.9    Chronic insomnia F51.04    Fibrocystic breast changes N60.19    Sciatica M54.30    Acute lower UTI N39.0    Essential hypertension I10    Rheumatoid arthritis involving multiple sites with positive rheumatoid factor ([facility] [address] M05.79     Past Medical History:   Diagnosis Date    Acute lower UTI 1/18/2018    Annual physical exam 1/18/2018    Arrhythmia 2000    TACHYCARDIA RHYTHM x1 episode 2000    Arthritis     spinal stenosis; previous spine surgery    Cataract 1/18/2018    Chronic insomnia 1/18/2018    Dyslipidemia 1/18/2018    EBT (electron beam tomography) abnormal 1/18/2018    Fibrocystic breast changes 1/18/2018    High cholesterol     HTN (hypertension) 1/18/2018    Inflammatory bowel disease 1/18/2018    Nephrolithiasis 1/18/2018    On statin therapy 1/18/2018    Post hysterectomy menopause 1/18/2018    RA (rheumatoid arthritis) ([address] 10/8/2020    Sciatica 1/18/2018    Visit for screening mammogram 1/18/2018      Past Surgical History:   Procedure Laterality Date    HX CATARACT REMOVAL      HX GYN      hysterectomy 1980    HX TONSILLECTOMY      HX UROLOGICAL  2009    URETERAL STENT    NEUROLOGICAL PROCEDURE UNLISTED  6/11    Back; 2 buldging disc fused, bone removed from hip     Current Outpatient Medications   Medication Sig Dispense Refill    LORazepam (ATIVAN) 2 mg tablet TAKE TWO TABLETS BY MOUTH DAILY AT BEDTIME  60 Tablet 0    amLODIPine (NORVASC) 5 mg tablet TAKE 1 TABLET BY MOUTH ONE TIME DAILY 90 Tab 3    rosuvastatin (CRESTOR) 10 mg tablet TAKE 1 TABLET BY MOUTH ONE TIME DAILY 90 Tab 3    methotrexate (RHEUMATREX) 2.5 mg tablet 20 mg every Monday.  folic acid (FOLVITE) 1 mg tablet Take  by mouth daily.  methotrexate 2.5 mg/mL soln Take  by mouth.  ERGOCALCIFEROL, VITAMIN D2, (VITAMIN D2 PO) Take  by mouth.  Cetirizine (ZYRTEC) 10 mg cap Take  by mouth.  multivitamin (MULTIPLE VITAMIN) tablet Take 1 Tab by mouth daily.  omega-3 fatty acids-vitamin e (FISH OIL) 1,000 mg Cap Take 1 Cap by mouth daily.  aspirin 81 mg tablet Take 81 mg by mouth. Allergies   Allergen Reactions    Levaquin [Levofloxacin] Hives       Family History   Problem Relation Age of Onset    Heart Disease Father         4 VESSEL CABG    Alcohol abuse Maternal Grandmother     Heart Disease Maternal Grandmother         MI    Alcohol abuse Maternal Grandfather     Asthma Paternal Grandmother      Social History     Tobacco Use    Smoking status: Never Smoker    Smokeless tobacco: Never Used   Substance Use Topics    Alcohol use:  Yes     Alcohol/week: 10.0 standard drinks     Types: 12 Cans of beer per week         VIJAY Agee MD           Avril Corona is a 80 y.o. female and presents with Annual Exam  .    Subjective:  Zohreh Robbins presents today for Medicare annual wellness review as well as follow-up of hyperlipidemia and hypertension. She is doing well on her current regimen which includes amlodipine 5 mg daily, rosuvastatin 10 mg daily, and lorazepam 2 mg nightly as needed. Her mother passed away approximately 3 weeks ago with advanced dementia. She has had increasing arthritic symptoms especially in the left side which interestingly developed just a few days after receiving the Moderna vaccine. She continues to take methotrexate and folic acid and has routine follow-up with her rheumatologist for rheumatoid arthritis.     Past Medical History:   Diagnosis Date    Acute lower UTI 1/18/2018    Annual physical exam 1/18/2018    Arrhythmia 2000    TACHYCARDIA RHYTHM x1 episode 2000    Arthritis     spinal stenosis; previous spine surgery    Cataract 1/18/2018    Chronic insomnia 1/18/2018    Dyslipidemia 1/18/2018    EBT (electron beam tomography) abnormal 1/18/2018    Fibrocystic breast changes 1/18/2018    High cholesterol     HTN (hypertension) 1/18/2018    Inflammatory bowel disease 1/18/2018    Nephrolithiasis 1/18/2018    On statin therapy 1/18/2018    Post hysterectomy menopause 1/18/2018    RA (rheumatoid arthritis) ([facility] [address] 10/8/2020    Sciatica 1/18/2018    Visit for screening mammogram 1/18/2018     Past Surgical History:   Procedure Laterality Date    HX CATARACT REMOVAL      HX GYN      hysterectomy 1980    HX TONSILLECTOMY      HX UROLOGICAL  2009    URETERAL STENT    NEUROLOGICAL PROCEDURE UNLISTED  6/11    Back; 2 buldging disc fused, bone removed from hip     Allergies   Allergen Reactions    Levaquin [Levofloxacin] Hives     Current Outpatient Medications   Medication Sig Dispense Refill    LORazepam (ATIVAN) 2 mg tablet TAKE TWO TABLETS BY MOUTH DAILY AT BEDTIME  60 Tablet 0    amLODIPine (NORVASC) 5 mg tablet TAKE 1 TABLET BY MOUTH ONE TIME DAILY 90 Tab 3    rosuvastatin (CRESTOR) 10 mg tablet TAKE 1 TABLET BY MOUTH ONE TIME DAILY 90 Tab 3    methotrexate (RHEUMATREX) 2.5 mg tablet 20 mg every Monday.  folic acid (FOLVITE) 1 mg tablet Take  by mouth daily.  methotrexate 2.5 mg/mL soln Take  by mouth.  ERGOCALCIFEROL, VITAMIN D2, (VITAMIN D2 PO) Take  by mouth.  Cetirizine (ZYRTEC) 10 mg cap Take  by mouth.  multivitamin (MULTIPLE VITAMIN) tablet Take 1 Tab by mouth daily.  omega-3 fatty acids-vitamin e (FISH OIL) 1,000 mg Cap Take 1 Cap by mouth daily.  aspirin 81 mg tablet Take 81 mg by mouth. Social History     Socioeconomic History    Marital status:      Spouse name: Not on file    Number of children: Not on file    Years of education: Not on file    Highest education level: Not on file   Tobacco Use    Smoking status: Never Smoker    Smokeless tobacco: Never Used   Substance and Sexual Activity    Alcohol use: Yes     Alcohol/week: 10.0 standard drinks     Types: 12 Cans of beer per week    Drug use: Yes     Types: Prescription     Social Determinants of Health     Financial Resource Strain:     Difficulty of Paying Living Expenses:    Food Insecurity:     Worried About Running Out of Food in the Last Year:     [address] in the Last Year:    Transportation Needs:     Lack of Transportation (Medical):      Lack of Transportation (Non-Medical):    Physical Activity:     Days of Exercise per Week:     Minutes of Exercise per Session:    Stress:     Feeling of Stress :    Social Connections:     Frequency of Communication with Friends and Family:     Frequency of Social Gatherings with Friends and Family:     Attends Advent Services:     Active Member of Clubs or Organizations:     Attends Club or Organization Meetings:     Marital Status:      Family History   Problem Relation Age of Onset    Heart Disease Father         4 VESSEL CABG    Alcohol abuse Maternal Grandmother     Heart Disease Maternal Grandmother         MI    Alcohol abuse Maternal Grandfather     Asthma Paternal Grandmother        Review of Systems  Constitutional:  negative for fevers, chills, anorexia and weight loss  Eyes:    negative for visual disturbance and irritation  ENT:    negative for tinnitus,sore throat,nasal congestion,ear pains. hoarseness  Respiratory:     negative for cough, hemoptysis, dyspnea,wheezing  CV:    negative for chest pain, palpitations, lower extremity edema  GI:    negative for nausea, vomiting, diarrhea, abdominal pain,melena  Endo:               negative for polyuria,polydipsia,polyphagia,heat intolerance  Genitourinary : negative for frequency, dysuria and hematuria  Integumentary: negative for rash and pruritus  Hematologic:   negative for easy bruising and gum/nose bleeding  Musculoskel:  negative for myalgias,  back pain, muscle weakness  Neurological:   negative for headaches, dizziness, vertigo, memory problems and gait   Behavl/Psych:  negative for feelings of anxiety, depression, mood changes  ROS otherwise negative      Objective:  Visit Vitals  /78 (BP 1 Location: Left arm, BP Patient Position: Sitting, BP Cuff Size: Large adult)   Pulse 91   Resp 18   Ht 5' 4\" (1.626 m)   Wt 165 lb (74.8 kg)   SpO2 98%   BMI 28.32 kg/m²     Physical Exam:   General appearance - alert, well appearing, and in no distress  Mental status - alert, oriented to person, place, and time  EYE-ABDIAZIZ, EOMI, fundi normal, corneas normal, no foreign bodies  ENT-ENT exam normal, no neck nodes or sinus tenderness  Nose - normal and patent, no erythema, discharge or polyps  Mouth - mucous membranes moist, pharynx normal without lesions  Neck - supple, no significant adenopathy   Chest - clear to auscultation, no wheezes, rales or rhonchi, symmetric air entry   Heart - normal rate, regular rhythm, normal S1, S2, no murmurs, rubs, clicks or gallops   Abdomen - soft, nontender, nondistended, no masses or organomegaly  Lymph- no adenopathy palpable  Ext-peripheral pulses normal, no pedal edema, no clubbing or cyanosis, bony changes noted of the PIP and MCP joints of both hands  Skin-Warm and dry. no hyperpigmentation, vitiligo, or suspicious lesions  Neuro -alert, oriented, normal speech, no focal findings or movement disorder noted      Assessment/Plan:  Diagnoses and all orders for this visit:    1. Medicare annual wellness visit, subsequent    2. Essential hypertension  -     METABOLIC PANEL, COMPREHENSIVE; Future  -     URINALYSIS W/ REFLEX CULTURE; Future    3. Rheumatoid arthritis involving multiple sites with positive rheumatoid factor ([facility] [address]    4. On statin therapy  -     CK; Future  -     LIPID PANEL; Future    5. Dyslipidemia  -     CK; Future  -     LIPID PANEL; Future    6. Chronic insomnia    7. Colon cancer screening  -     COLOGUARD TEST (FECAL DNA COLORECTAL CANCER SCREENING)    8. Screening for diabetes mellitus    9. Screening for ischemic heart disease    10. Screening for depression  -     DEPRESSION SCREEN ANNUAL    11. Encounter for screening mammogram for malignant neoplasm of breast  -     JAMIN MAMMO BI SCREENING INCL CAD; Future          ICD-10-CM ICD-9-CM    1. Medicare annual wellness visit, subsequent  Z00.00 V70.0    2. Essential hypertension  V20 406.5 METABOLIC PANEL, COMPREHENSIVE      URINALYSIS W/ REFLEX CULTURE   3. Rheumatoid arthritis involving multiple sites with positive rheumatoid factor (HCC)  M05.79 714.0    4. On statin therapy  Z79.899 V58.69 CK      LIPID PANEL   5. Dyslipidemia  E78.5 272.4 CK      LIPID PANEL   6. Chronic insomnia  F51.04 780.52    7. Colon cancer screening  Z12.11 V76.51 COLOGUARD TEST (FECAL DNA COLORECTAL CANCER SCREENING)   8. Screening for diabetes mellitus  Z13.1 V77.1    9. Screening for ischemic heart disease  Z13.6 V81.0    10. Screening for depression  Z13.31 V79.0 Emilie Torres   11. Encounter for screening mammogram for malignant neoplasm of breast  Z12.31 V76.12 JAMIN MAMMO BI SCREENING INCL CAD       Plan:    Continue current medical regimen as outlined above.   Patient will have a Cologuard test and mammogram scheduled. She declines further vaccinations. Further recommendations based on labs as ordered. I have reviewed with the patient details of the assessment and plan and all questions were answered. Relevent patient education was performed. Verbal and/or written instructions (see AVS) provided. The most recent lab findings were reviewed with the patient. Plan was discussed with patient who verbally expressed understanding. An After Visit Summary was printed and given to the patient.     Dianna Arambula MD

## 2021-12-09 ENCOUNTER — TELEPHONE (OUTPATIENT)
Dept: INTERNAL MEDICINE CLINIC | Age: 71
End: 2021-12-09

## 2021-12-09 DIAGNOSIS — R19.5 POSITIVE COLORECTAL CANCER SCREENING USING COLOGUARD TEST: Primary | ICD-10-CM

## 2021-12-09 NOTE — TELEPHONE ENCOUNTER
Patient informed Cologuard results were positive. Dr Jeff Bazzi has placed a referral for her to have a colonoscopy done with Dr Andres Gupta. Patient would like to schedule the appointment herself. She was given Dr Tonia Pruett office phone number and we will fax her information to them.

## 2021-12-23 ENCOUNTER — TRANSCRIBE ORDER (OUTPATIENT)
Dept: SCHEDULING | Age: 71
End: 2021-12-23

## 2021-12-23 DIAGNOSIS — R19.5 POSITIVE COLORECTAL CANCER SCREENING USING COLOGUARD TEST: Primary | ICD-10-CM

## 2022-01-10 ENCOUNTER — ANESTHESIA EVENT (OUTPATIENT)
Dept: ENDOSCOPY | Age: 72
End: 2022-01-10
Payer: MEDICARE

## 2022-01-10 ENCOUNTER — ANESTHESIA (OUTPATIENT)
Dept: ENDOSCOPY | Age: 72
End: 2022-01-10
Payer: MEDICARE

## 2022-01-10 ENCOUNTER — HOSPITAL ENCOUNTER (OUTPATIENT)
Age: 72
Setting detail: OUTPATIENT SURGERY
Discharge: HOME | End: 2022-01-10
Attending: INTERNAL MEDICINE | Admitting: INTERNAL MEDICINE
Payer: MEDICARE

## 2022-01-10 VITALS
BODY MASS INDEX: 27.16 KG/M2 | HEART RATE: 81 BPM | SYSTOLIC BLOOD PRESSURE: 144 MMHG | HEIGHT: 65 IN | DIASTOLIC BLOOD PRESSURE: 79 MMHG | WEIGHT: 163 LBS | RESPIRATION RATE: 20 BRPM | TEMPERATURE: 98 F | OXYGEN SATURATION: 97 %

## 2022-01-10 LAB
COVID-19 RAPID TEST, COVR: NOT DETECTED
SOURCE, COVRS: NORMAL

## 2022-01-10 PROCEDURE — 87635 SARS-COV-2 COVID-19 AMP PRB: CPT

## 2022-01-10 PROCEDURE — 74011250636 HC RX REV CODE- 250/636: Performed by: NURSE ANESTHETIST, CERTIFIED REGISTERED

## 2022-01-10 PROCEDURE — 74011250636 HC RX REV CODE- 250/636: Performed by: INTERNAL MEDICINE

## 2022-01-10 PROCEDURE — 2709999900 HC NON-CHARGEABLE SUPPLY: Performed by: INTERNAL MEDICINE

## 2022-01-10 PROCEDURE — 76040000019: Performed by: INTERNAL MEDICINE

## 2022-01-10 PROCEDURE — 77030013992 HC SNR POLYP ENDOSC BSC -B: Performed by: INTERNAL MEDICINE

## 2022-01-10 PROCEDURE — 76060000031 HC ANESTHESIA FIRST 0.5 HR: Performed by: INTERNAL MEDICINE

## 2022-01-10 PROCEDURE — 88305 TISSUE EXAM BY PATHOLOGIST: CPT

## 2022-01-10 PROCEDURE — 74011000250 HC RX REV CODE- 250: Performed by: NURSE ANESTHETIST, CERTIFIED REGISTERED

## 2022-01-10 RX ORDER — SODIUM CHLORIDE 9 MG/ML
75 INJECTION, SOLUTION INTRAVENOUS CONTINUOUS
Status: DISCONTINUED | OUTPATIENT
Start: 2022-01-10 | End: 2022-01-10 | Stop reason: HOSPADM

## 2022-01-10 RX ORDER — FLUMAZENIL 0.1 MG/ML
0.2 INJECTION INTRAVENOUS
Status: DISCONTINUED | OUTPATIENT
Start: 2022-01-10 | End: 2022-01-10 | Stop reason: HOSPADM

## 2022-01-10 RX ORDER — DEXTROMETHORPHAN/PSEUDOEPHED 2.5-7.5/.8
1.2 DROPS ORAL
Status: DISCONTINUED | OUTPATIENT
Start: 2022-01-10 | End: 2022-01-10 | Stop reason: HOSPADM

## 2022-01-10 RX ORDER — EPINEPHRINE 0.1 MG/ML
1 INJECTION INTRACARDIAC; INTRAVENOUS
Status: DISCONTINUED | OUTPATIENT
Start: 2022-01-10 | End: 2022-01-10 | Stop reason: HOSPADM

## 2022-01-10 RX ORDER — PROPOFOL 10 MG/ML
INJECTION, EMULSION INTRAVENOUS AS NEEDED
Status: DISCONTINUED | OUTPATIENT
Start: 2022-01-10 | End: 2022-01-10 | Stop reason: HOSPADM

## 2022-01-10 RX ORDER — LIDOCAINE HYDROCHLORIDE 20 MG/ML
INJECTION, SOLUTION EPIDURAL; INFILTRATION; INTRACAUDAL; PERINEURAL AS NEEDED
Status: DISCONTINUED | OUTPATIENT
Start: 2022-01-10 | End: 2022-01-10 | Stop reason: HOSPADM

## 2022-01-10 RX ORDER — ATROPINE SULFATE 0.1 MG/ML
0.5 INJECTION INTRAVENOUS
Status: DISCONTINUED | OUTPATIENT
Start: 2022-01-10 | End: 2022-01-10 | Stop reason: HOSPADM

## 2022-01-10 RX ORDER — SODIUM CHLORIDE 0.9 % (FLUSH) 0.9 %
5-40 SYRINGE (ML) INJECTION AS NEEDED
Status: DISCONTINUED | OUTPATIENT
Start: 2022-01-10 | End: 2022-01-10 | Stop reason: HOSPADM

## 2022-01-10 RX ORDER — NALOXONE HYDROCHLORIDE 0.4 MG/ML
0.4 INJECTION, SOLUTION INTRAMUSCULAR; INTRAVENOUS; SUBCUTANEOUS
Status: DISCONTINUED | OUTPATIENT
Start: 2022-01-10 | End: 2022-01-10 | Stop reason: HOSPADM

## 2022-01-10 RX ORDER — SODIUM CHLORIDE 0.9 % (FLUSH) 0.9 %
5-40 SYRINGE (ML) INJECTION EVERY 8 HOURS
Status: DISCONTINUED | OUTPATIENT
Start: 2022-01-10 | End: 2022-01-10 | Stop reason: HOSPADM

## 2022-01-10 RX ADMIN — PROPOFOL 40 MG: 10 INJECTION, EMULSION INTRAVENOUS at 12:56

## 2022-01-10 RX ADMIN — PROPOFOL 20 MG: 10 INJECTION, EMULSION INTRAVENOUS at 12:55

## 2022-01-10 RX ADMIN — PROPOFOL 40 MG: 10 INJECTION, EMULSION INTRAVENOUS at 12:57

## 2022-01-10 RX ADMIN — PROPOFOL 20 MG: 10 INJECTION, EMULSION INTRAVENOUS at 13:03

## 2022-01-10 RX ADMIN — PROPOFOL 20 MG: 10 INJECTION, EMULSION INTRAVENOUS at 13:06

## 2022-01-10 RX ADMIN — PROPOFOL 20 MG: 10 INJECTION, EMULSION INTRAVENOUS at 12:53

## 2022-01-10 RX ADMIN — PROPOFOL 20 MG: 10 INJECTION, EMULSION INTRAVENOUS at 13:04

## 2022-01-10 RX ADMIN — PROPOFOL 20 MG: 10 INJECTION, EMULSION INTRAVENOUS at 13:05

## 2022-01-10 RX ADMIN — SODIUM CHLORIDE: 9 INJECTION, SOLUTION INTRAVENOUS at 12:47

## 2022-01-10 RX ADMIN — PROPOFOL 40 MG: 10 INJECTION, EMULSION INTRAVENOUS at 12:54

## 2022-01-10 RX ADMIN — PROPOFOL 20 MG: 10 INJECTION, EMULSION INTRAVENOUS at 13:00

## 2022-01-10 RX ADMIN — PROPOFOL 20 MG: 10 INJECTION, EMULSION INTRAVENOUS at 12:59

## 2022-01-10 RX ADMIN — SODIUM CHLORIDE 75 ML/HR: 9 INJECTION, SOLUTION INTRAVENOUS at 12:50

## 2022-01-10 RX ADMIN — PROPOFOL 20 MG: 10 INJECTION, EMULSION INTRAVENOUS at 13:02

## 2022-01-10 RX ADMIN — PROPOFOL 20 MG: 10 INJECTION, EMULSION INTRAVENOUS at 13:07

## 2022-01-10 RX ADMIN — PROPOFOL 40 MG: 10 INJECTION, EMULSION INTRAVENOUS at 12:58

## 2022-01-10 RX ADMIN — LIDOCAINE HYDROCHLORIDE 50 MG: 20 INJECTION, SOLUTION EPIDURAL; INFILTRATION; INTRACAUDAL; PERINEURAL at 12:52

## 2022-01-10 RX ADMIN — PROPOFOL 80 MG: 10 INJECTION, EMULSION INTRAVENOUS at 12:52

## 2022-01-10 NOTE — ANESTHESIA POSTPROCEDURE EVALUATION
Procedure(s):  COLONOSCOPY  needs rapid covid  ENDOSCOPIC POLYPECTOMY. MAC    Anesthesia Post Evaluation        Patient location during evaluation: PACU  Note status: Adequate. Level of consciousness: responsive to verbal stimuli and sleepy but conscious  Pain management: satisfactory to patient  Airway patency: patent  Anesthetic complications: no  Cardiovascular status: acceptable  Respiratory status: acceptable  Hydration status: acceptable  Comments: +Post-Anesthesia Evaluation and Assessment    Patient: Carrillo Green MRN: [id number]  SSN: [id number]   YOB: 1950  Age: 80 y.o. Sex: female      Cardiovascular Function/Vital Signs    BP (!) 144/79   Pulse 81   Temp 36.7 °C (98 °F)   Resp 20   Ht 5' 5\" (1.651 m)   Wt 73.9 kg (163 lb)   SpO2 97%   BMI 27.12 kg/m²     Patient is status post Procedure(s):  COLONOSCOPY  needs rapid covid  ENDOSCOPIC POLYPECTOMY. Nausea/Vomiting: Controlled. Postoperative hydration reviewed and adequate. Pain:  Pain Scale 1: Numeric (0 - 10) (01/10/22 1330)  Pain Intensity 1: 0 (01/10/22 1330)   Managed. Neurological Status: At baseline. Mental Status and Level of Consciousness: Arousable. Pulmonary Status:   O2 Device: None (Room air) (01/10/22 1330)   Adequate oxygenation and airway patent. Complications related to anesthesia: None    Post-anesthesia assessment completed. No concerns. Signed By: Kate Trevino MD    1/10/2022  Post anesthesia nausea and vomiting:  controlled      INITIAL Post-op Vital signs:   Vitals Value Taken Time   /79 01/10/22 1330   Temp 36.7 °C (98 °F) 01/10/22 1319   Pulse 84 01/10/22 1333   Resp 22 01/10/22 1333   SpO2 96 % 01/10/22 1333   Vitals shown include unvalidated device data.

## 2022-01-10 NOTE — ROUTINE PROCESS
Raman Sauer  1950  [id number]    Situation:  Verbal report received from: Gurdeep Schaffer  Procedure: Procedure(s):  COLONOSCOPY  needs rapid covid  ENDOSCOPIC POLYPECTOMY    Background:    Preoperative diagnosis: +COLOGUARD  Postoperative diagnosis: Colon Polyp, Hemorrhoids    :  Dr. Brenda Everett  Assistant(s): Endoscopy RN-1: Leonor Hull RN; Fiordaliza Eng RN    Specimens:   ID Type Source Tests Collected by Time Destination   1 : Cecal Polyp Preservative Cecum  Curt Edwards MD 1/10/2022 1305 Pathology     H. Pylori  no    Assessment:  Intra-procedure medications     Anesthesia gave intra-procedure sedation and medications, see anesthesia flow sheet yes    Intravenous fluids: NS@ KVO     Vital signs stable     Abdominal assessment: round and soft     Recommendation:  Discharge patient per MD order. Family  Permission to share finding with family or friend yes    Endoscopy discharge instructions have been reviewed and given to patient. The patient verbalized understanding and acceptance of instructions.

## 2022-01-10 NOTE — PROCEDURES
NAME:  Carlie Arboleda   :   1950   MRN:   [id number]     Date/Time:  1/10/2022 1:10 PM    Colonoscopy Operative Report    Procedure Type:   Colonoscopy with polypectomy (cold snare)     Indications:     Occult blood in stool (+Cologuard)  Pre-operative Diagnosis: see indication above  Post-operative Diagnosis:  See findings below  :  Caryl Au MD  Referring Provider: --Jada Arnold MD    Exam:  Airway: clear, no airway problems anticipated  Heart: RRR, without gallops or rubs  Lungs: clear bilaterally without wheezes, crackles, or rhonchi  Abdomen: soft, nontender, nondistended, bowel sounds present  Mental Status: awake, alert and oriented to person, place and time    Sedation:  MAC anesthesia Propofol  Procedure Details:  After informed consent was obtained with all risks and benefits of procedure explained and preoperative exam completed, the patient was taken to the endoscopy suite and placed in the left lateral decubitus position. Upon sequential sedation as per above, a digital rectal exam was performed demonstrating internal hemorrhoids. The Olympus videocolonoscope  was inserted in the rectum and carefully advanced to the cecum, which was identified by the ileocecal valve and appendiceal orifice. The quality of preparation was good. The colonoscope was slowly withdrawn with careful evaluation between folds. Retroflexion in the rectum was completed demonstrating internal hemorrhoids. Findings:   1. 5 mm sessile polyp in cecum. Removed by cold snare polypectomy  2. Medium sized internal hemorrhoids seen on retroflexion  3. Otherwise normal colonoscopy through to the cecum    Specimen Removed:  1. Cecal polyp  Complications: None. EBL:  None. Impression:    1. 5 mm sessile polyp in cecum. Removed by cold snare polypectomy  2. Medium sized internal hemorrhoids seen on retroflexion  3. Otherwise normal colonoscopy through to the cecum    Recommendations:   1. Follow up pathology  2. Repeat colonoscopy in 5 years for surveillance    Discharge Disposition:  Home in the company of a  when able to ambulate.       Delia Martinez MD

## 2022-01-10 NOTE — ANESTHESIA PREPROCEDURE EVALUATION
Relevant Problems   CARDIOVASCULAR   (+) Essential hypertension      RENAL FAILURE   (+) Nephrolithiasis      ENDOCRINE   (+) Rheumatoid arthritis involving multiple sites with positive rheumatoid factor (HCC)       Anesthetic History   No history of anesthetic complications            Review of Systems / Medical History  Patient summary reviewed, nursing notes reviewed and pertinent labs reviewed    Pulmonary  Within defined limits                 Neuro/Psych   Within defined limits           Cardiovascular    Hypertension        Dysrhythmias       Exercise tolerance: >4 METS     GI/Hepatic/Renal  Within defined limits              Endo/Other        Arthritis     Other Findings   Comments: Back surgery   sciatica           Physical Exam    Airway  Mallampati: II  TM Distance: 4 - 6 cm  Neck ROM: normal range of motion   Mouth opening: Normal     Cardiovascular  Regular rate and rhythm,  S1 and S2 normal,  no murmur, click, rub, or gallop             Dental  No notable dental hx       Pulmonary  Breath sounds clear to auscultation               Abdominal  GI exam deferred       Other Findings            Anesthetic Plan    ASA: 2  Anesthesia type: MAC            Anesthetic plan and risks discussed with: Patient

## 2022-01-10 NOTE — PROGRESS NOTES
1308:  Endoscope was pre-cleaned at the bedside immediately following procedure by Julia Pizarro RN.      7053: Anesthesia reports 440 mg Propofol, 50 mg Lidocaine and 700 mL NS given during procedure. Received report from anesthesia staff on vital signs and status of patient.

## 2022-01-10 NOTE — H&P
Gastroenterology Outpatient History and Physical    Patient: Luli Peña    Physician: Jigar Hussein MD    Chief Complaint: +Cologuard  History of Present Illness: No GI complaints    History:  Past Medical History:   Diagnosis Date    Acute lower UTI 1/18/2018    Annual physical exam 1/18/2018    Arrhythmia 2000    TACHYCARDIA RHYTHM x1 episode 2000    Arthritis     spinal stenosis; previous spine surgery    Cataract 1/18/2018    Chronic insomnia 1/18/2018    Dyslipidemia 1/18/2018    EBT (electron beam tomography) abnormal 1/18/2018    Fibrocystic breast changes 1/18/2018    High cholesterol     HTN (hypertension) 1/18/2018    Inflammatory bowel disease 1/18/2018    Nephrolithiasis 1/18/2018    On statin therapy 1/18/2018    Post hysterectomy menopause 1/18/2018    RA (rheumatoid arthritis) ([facility] [address] 10/8/2020    Sciatica 1/18/2018    Visit for screening mammogram 1/18/2018      Past Surgical History:   Procedure Laterality Date    HX CATARACT REMOVAL      HX GYN      hysterectomy 1980    HX TONSILLECTOMY      HX UROLOGICAL  2009    URETERAL STENT    NEUROLOGICAL PROCEDURE UNLISTED  6/11    Back; 2 buldging disc fused, bone removed from hip      Social History     Socioeconomic History    Marital status:    Tobacco Use    Smoking status: Never Smoker    Smokeless tobacco: Never Used   Substance and Sexual Activity    Alcohol use:  Yes     Alcohol/week: 10.0 standard drinks     Types: 12 Cans of beer per week    Drug use: Yes     Types: Prescription      Family History   Problem Relation Age of Onset    Heart Disease Father         4 VESSEL CABG    Alcohol abuse Maternal Grandmother     Heart Disease Maternal Grandmother         MI    Alcohol abuse Maternal Grandfather     Asthma Paternal Grandmother       Patient Active Problem List   Diagnosis Code    Spinal stenosis, lumbar region, with neurogenic claudication M48.062    Nephrolithiasis N20.0    EBT (electron beam tomography) abnormal R94.39    Inflammatory bowel disease K52.9    Post hysterectomy menopause E89.40, Z90.710    On statin therapy Z79.899    Dyslipidemia E78.5    Cataract H26.9    Chronic insomnia F51.04    Fibrocystic breast changes N60.19    Sciatica M54.30    Acute lower UTI N39.0    Essential hypertension I10    Rheumatoid arthritis involving multiple sites with positive rheumatoid factor (HCC) M05.79       Allergies: Allergies   Allergen Reactions    Levaquin [Levofloxacin] Hives     Medications:   Prior to Admission medications    Medication Sig Start Date End Date Taking? Authorizing Provider   amLODIPine (NORVASC) 5 mg tablet TAKE 1 TABLET BY MOUTH ONE TIME DAILY 11/9/21   Marine Woodard MD   rosuvastatin (CRESTOR) 10 mg tablet TAKE 1 TABLET BY MOUTH ONE TIME DAILY 11/4/21   Marine Woodard MD   LORazepam (ATIVAN) 2 mg tablet TAKE TWO TABLETS BY MOUTH DAILY AT BEDTIME  10/14/21   Marine Woodard MD   methotrexate (RHEUMATREX) 2.5 mg tablet 20 mg every Monday. 1/18/18   Provider, Historical   folic acid (FOLVITE) 1 mg tablet Take  by mouth daily. Provider, Historical   methotrexate 2.5 mg/mL soln Take  by mouth. Provider, Historical   ERGOCALCIFEROL, VITAMIN D2, (VITAMIN D2 PO) Take  by mouth. Provider, Historical   Cetirizine (ZYRTEC) 10 mg cap Take  by mouth. Provider, Historical   multivitamin (MULTIPLE VITAMIN) tablet Take 1 Tab by mouth daily. Provider, Historical   omega-3 fatty acids-vitamin e (FISH OIL) 1,000 mg Cap Take 1 Cap by mouth daily. Provider, Historical   aspirin 81 mg tablet Take 81 mg by mouth. Provider, Historical     Physical Exam:   Vital Signs: There were no vitals taken for this visit.   General: well developed, well nourished   HEENT: unremarkable   Heart: regular rhythm no mumur    Lungs: clear   Abdominal:  benign   Neurological: unremarkable   Extremities: no edema     Findings/Diagnosis: +Cologuard  Plan of Care/Planned Procedure: Colonoscopy with conscious/deep sedation    Signed:  Sabine Garcias MD 1/10/2022

## 2022-01-10 NOTE — DISCHARGE INSTRUCTIONS
Mac Lara  [id number]  1950    COLON DISCHARGE INSTRUCTIONS  Discomfort:  Redness at IV site- apply warm compress to area; if redness or soreness persist- contact your physician  There may be a slight amount of blood passed from the rectum  Gaseous discomfort- walking, belching will help relieve any discomfort  You may not operate a vehicle for 12 hours  You may not engage in an occupation involving machinery or appliances for rest of today  You may not drink alcoholic beverages for at least 12 hours  Avoid making any critical decisions for at least 24 hour  DIET:   Regular diet. - however -  remember your colon is empty and a heavy meal will produce gas. Avoid these foods:  vegetables, fried / greasy foods, carbonated drinks for today  MEDICATION:  Per Medication Reconciliation       ACTIVITY:  You may not resume your normal daily activities until tomorrow AM; it is recommended that you spend the remainder of the day resting -  avoid any strenuous activity. CALL M.D. ANY SIGN OF:   Increasing pain, nausea, vomiting  Abdominal distension (swelling)  New increased bleeding (oral or rectal)  Fever (chills)  Pain in chest area  Bloody discharge from nose or mouth  Shortness of breath    You may not  take any Advil, Aspirin, Ibuprofen, Motrin, Aleve, or Goodys for 10 days, ONLY  Tylenol as needed for pain. IMPRESSION:  Impression:    1. 5 mm sessile polyp in cecum. Removed by cold snare polypectomy  2. Medium sized internal hemorrhoids seen on retroflexion  3. Otherwise normal colonoscopy through to the cecum    Recommendations:   1. Follow up pathology  2.  Repeat colonoscopy in 5 years for surveillance    Follow-up Instructions:   Call Dr. Ryann Ramirez for the results of procedure / biopsy in 7-10 days  Telephone #[phone]      Donal Todd MD    Patient Education   Patient Education        Hemorrhoids: Care Instructions  Overview     Hemorrhoids are swollen veins that develop in the anal canal. Bleeding during bowel movements, itching, and rectal pain are the most common symptoms. Hemorrhoids can be uncomfortable at times, but rarely are they a serious problem. Most of the time, you can treat them with simple changes to your diet and bowel habits. These changes include eating more fiber and not straining to pass stools. Most hemorrhoids don't need surgery or other treatment unless they are very large and painful or bleed a lot. Follow-up care is a key part of your treatment and safety. Be sure to make and go to all appointments, and call your doctor if you are having problems. It's also a good idea to know your test results and keep a list of the medicines you take. How can you care for yourself at home? · Sit in a few inches of warm water (sitz bath) 3 times a day and after bowel movements. The warm water helps with pain and itching. · Put ice on your anal area several times a day for 10 minutes at a time. Put a thin cloth between the ice and your skin. Follow this by placing a warm, wet towel on the area for another 10 to 20 minutes. · Take pain medicines exactly as directed. ? If the doctor gave you a prescription medicine for pain, take it as prescribed. ? If you are not taking a prescription pain medicine, ask your doctor if you can take an over-the-counter medicine. · Keep the anal area clean, but be gentle. Use water and a fragrance-free soap, or use baby wipes or medicated pads such as Tucks. · Wear cotton underwear and loose clothing to decrease moisture in the anal area. · Eat more fiber. Include foods such as whole-grain breads and cereals, raw vegetables, raw and dried fruits, and beans. · Drink plenty of fluids. If you have kidney, heart, or liver disease and have to limit fluids, talk with your doctor before you increase the amount of fluids you drink. · Use a stool softener that contains bran or psyllium.  You can save money by buying bran or psyllium (available in bulk at most health food stores) and sprinkling it on foods or stirring it into fruit juice. Or you can use a product such as Metamucil or Hydrocil. · Practice healthy bowel habits. ? Go to the bathroom as soon as you have the urge. ? Avoid straining to pass stools. Relax and give yourself time to let things happen naturally. ? Do not hold your breath while passing stools. ? Do not read while sitting on the toilet. Get off the toilet as soon as you have finished. · Take your medicines exactly as prescribed. Call your doctor if you think you are having a problem with your medicine. When should you call for help? Call 911 anytime you think you may need emergency care. For example, call if:    · You pass maroon or very bloody stools. Call your doctor now or seek immediate medical care if:    · You have increased pain.     · You have increased bleeding. Watch closely for changes in your health, and be sure to contact your doctor if:    · Your symptoms have not improved after 3 or 4 days. Where can you learn more? Go to http://www.lopez.com/  Enter F228 in the search box to learn more about \"Hemorrhoids: Care Instructions. \"  Current as of: February 10, 2021               Content Version: 13.0  © 2006-2021 [employer]. Care instructions adapted under license by [employer] (which disclaims liability or warranty for this information). If you have questions about a medical condition or this instruction, always ask your healthcare professional. [place] [address] any warranty or liability for your use of this information. Colon Polyps: Care Instructions  Your Care Instructions     Colon polyps are growths in the colon or the rectum. The cause of most colon polyps is not known, and most people who get them do not have any problems. But a certain kind can turn into cancer.  For this reason, regular testing for colon polyps is important for people as they get older. It is also important for anyone who has an increased risk for colon cancer. Polyps are usually found through routine colon cancer screening tests. Although most colon polyps are not cancerous, they are usually removed and then tested for cancer. Screening for colon cancer saves lives because the cancer can usually be cured if it is caught early. If you have a polyp that is the type that can turn into cancer, you may need more tests to examine your entire colon. The doctor will remove any other polyps that he or she finds, and you will be tested more often. Follow-up care is a key part of your treatment and safety. Be sure to make and go to all appointments, and call your doctor if you are having problems. It's also a good idea to know your test results and keep a list of the medicines you take. How can you care for yourself at home? Regular exams to look for colon polyps are the best way to prevent polyps from turning into colon cancer. These can include stool tests, sigmoidoscopy, colonoscopy, and CT colonography. Talk with your doctor about a testing schedule that is right for you. To prevent polyps  There is no home treatment that can prevent colon polyps. But these steps may help lower your risk for cancer. · Stay active. Being active can help you get to and stay at a healthy weight. Try to exercise on most days of the week. Walking is a good choice. · Eat well. Choose a variety of vegetables, fruits, legumes (such as peas and beans), fish, poultry, and whole grains. · Do not smoke. If you need help quitting, talk to your doctor about stop-smoking programs and medicines. These can increase your chances of quitting for good. · If you drink alcohol, limit how much you drink. Limit alcohol to 2 drinks a day for men and 1 drink a day for women. When should you call for help?    Call your doctor now or seek immediate medical care if:    · You have severe belly pain.     · Your stools are maroon or very bloody. Watch closely for changes in your health, and be sure to contact your doctor if:    · You have a fever.     · You have nausea or vomiting.     · You have a change in bowel habits (new constipation or diarrhea).     · Your symptoms get worse or are not improving as expected. Where can you learn more? Go to http://www.lopez.com/  Enter C571 in the search box to learn more about \"Colon Polyps: Care Instructions. \"  Current as of: December 17, 2020               Content Version: 13.0  © 8151-3919 [employer]. Care instructions adapted under license by [employer] (which disclaims liability or warranty for this information). If you have questions about a medical condition or this instruction, always ask your healthcare professional. [address] any warranty or liability for your use of this information.

## 2022-03-18 PROBLEM — M05.79 RHEUMATOID ARTHRITIS INVOLVING MULTIPLE SITES WITH POSITIVE RHEUMATOID FACTOR (HCC): Status: ACTIVE | Noted: 2020-10-08

## 2022-03-18 PROBLEM — I10 ESSENTIAL HYPERTENSION: Status: ACTIVE | Noted: 2018-01-22

## 2022-03-18 PROBLEM — M54.30 SCIATICA: Status: ACTIVE | Noted: 2018-01-18

## 2022-03-19 PROBLEM — R94.39 EBT (ELECTRON BEAM TOMOGRAPHY) ABNORMAL: Status: ACTIVE | Noted: 2018-01-18

## 2022-03-19 PROBLEM — Z79.899 ON STATIN THERAPY: Status: ACTIVE | Noted: 2018-01-18

## 2022-03-19 PROBLEM — E78.5 DYSLIPIDEMIA: Status: ACTIVE | Noted: 2018-01-18

## 2022-03-19 PROBLEM — N60.19 FIBROCYSTIC BREAST CHANGES: Status: ACTIVE | Noted: 2018-01-18

## 2022-03-19 PROBLEM — K52.9 INFLAMMATORY BOWEL DISEASE: Status: ACTIVE | Noted: 2018-01-18

## 2022-03-19 PROBLEM — F51.04 CHRONIC INSOMNIA: Status: ACTIVE | Noted: 2018-01-18

## 2022-03-19 PROBLEM — H26.9 CATARACT: Status: ACTIVE | Noted: 2018-01-18

## 2022-03-19 PROBLEM — N20.0 NEPHROLITHIASIS: Status: ACTIVE | Noted: 2018-01-18

## 2022-03-20 PROBLEM — Z90.710 POST HYSTERECTOMY MENOPAUSE: Status: ACTIVE | Noted: 2018-01-18

## 2022-03-20 PROBLEM — E89.40 POST HYSTERECTOMY MENOPAUSE: Status: ACTIVE | Noted: 2018-01-18

## 2022-03-20 PROBLEM — N39.0 ACUTE LOWER UTI: Status: ACTIVE | Noted: 2018-01-18

## 2022-04-08 DIAGNOSIS — F41.1 GAD (GENERALIZED ANXIETY DISORDER): ICD-10-CM

## 2022-04-11 RX ORDER — LORAZEPAM 2 MG/1
TABLET ORAL
Qty: 60 TABLET | Refills: 0 | Status: SHIPPED | OUTPATIENT
Start: 2022-04-11 | End: 2022-05-09

## 2022-04-22 ENCOUNTER — OFFICE VISIT (OUTPATIENT)
Dept: INTERNAL MEDICINE CLINIC | Age: 72
End: 2022-04-22
Payer: MEDICARE

## 2022-04-22 VITALS
WEIGHT: 167.4 LBS | DIASTOLIC BLOOD PRESSURE: 80 MMHG | TEMPERATURE: 97.9 F | HEIGHT: 65 IN | OXYGEN SATURATION: 98 % | HEART RATE: 89 BPM | RESPIRATION RATE: 16 BRPM | SYSTOLIC BLOOD PRESSURE: 122 MMHG | BODY MASS INDEX: 27.89 KG/M2

## 2022-04-22 DIAGNOSIS — M05.79 RHEUMATOID ARTHRITIS INVOLVING MULTIPLE SITES WITH POSITIVE RHEUMATOID FACTOR (HCC): ICD-10-CM

## 2022-04-22 DIAGNOSIS — E78.5 DYSLIPIDEMIA: ICD-10-CM

## 2022-04-22 DIAGNOSIS — I10 ESSENTIAL HYPERTENSION: Primary | Chronic | ICD-10-CM

## 2022-04-22 DIAGNOSIS — E55.9 VITAMIN D DEFICIENCY: ICD-10-CM

## 2022-04-22 DIAGNOSIS — L65.9 HAIR LOSS: ICD-10-CM

## 2022-04-22 DIAGNOSIS — Z12.31 ENCOUNTER FOR SCREENING MAMMOGRAM FOR MALIGNANT NEOPLASM OF BREAST: ICD-10-CM

## 2022-04-22 DIAGNOSIS — Z79.899 ON STATIN THERAPY: ICD-10-CM

## 2022-04-22 PROCEDURE — 3017F COLORECTAL CA SCREEN DOC REV: CPT | Performed by: INTERNAL MEDICINE

## 2022-04-22 PROCEDURE — G8399 PT W/DXA RESULTS DOCUMENT: HCPCS | Performed by: INTERNAL MEDICINE

## 2022-04-22 PROCEDURE — G8752 SYS BP LESS 140: HCPCS | Performed by: INTERNAL MEDICINE

## 2022-04-22 PROCEDURE — 1101F PT FALLS ASSESS-DOCD LE1/YR: CPT | Performed by: INTERNAL MEDICINE

## 2022-04-22 PROCEDURE — G8754 DIAS BP LESS 90: HCPCS | Performed by: INTERNAL MEDICINE

## 2022-04-22 PROCEDURE — G8419 CALC BMI OUT NRM PARAM NOF/U: HCPCS | Performed by: INTERNAL MEDICINE

## 2022-04-22 PROCEDURE — G8432 DEP SCR NOT DOC, RNG: HCPCS | Performed by: INTERNAL MEDICINE

## 2022-04-22 PROCEDURE — G8427 DOCREV CUR MEDS BY ELIG CLIN: HCPCS | Performed by: INTERNAL MEDICINE

## 2022-04-22 PROCEDURE — G9899 SCRN MAM PERF RSLTS DOC: HCPCS | Performed by: INTERNAL MEDICINE

## 2022-04-22 PROCEDURE — G8536 NO DOC ELDER MAL SCRN: HCPCS | Performed by: INTERNAL MEDICINE

## 2022-04-22 PROCEDURE — 1090F PRES/ABSN URINE INCON ASSESS: CPT | Performed by: INTERNAL MEDICINE

## 2022-04-22 PROCEDURE — 99214 OFFICE O/P EST MOD 30 MIN: CPT | Performed by: INTERNAL MEDICINE

## 2022-04-22 NOTE — PROGRESS NOTES
Noé Doan is a 71 y.o. female and presents with Follow Up Chronic Condition (6 mo fu)  . Subjective:  Mrs. Gina Matthews presents today for follow-up of hyperlipidemia, hypertension, monitoring of statin therapy. She remains on methotrexate for rheumatoid arthritis and has follow-up with rheumatology on a regular basis. Her mother passed away September of last year. She has felt well in general.  She has no shortness of breath, chest pain, palpitations, PND, orthopnea, or pedal edema. She has had some hair loss which has been concerning for her. Her hairdresser has noticed this has progressed over the past several months.     Past Medical History:   Diagnosis Date    Acute lower UTI 1/18/2018    Annual physical exam 1/18/2018    Arrhythmia 2000    TACHYCARDIA RHYTHM x1 episode 2000    Arthritis     spinal stenosis; previous spine surgery    Cataract 1/18/2018    Chronic insomnia 1/18/2018    Dyslipidemia 1/18/2018    EBT (electron beam tomography) abnormal 1/18/2018    Fibrocystic breast changes 1/18/2018    High cholesterol     HTN (hypertension) 1/18/2018    Inflammatory bowel disease 1/18/2018    Nephrolithiasis 1/18/2018    On statin therapy 1/18/2018    Post hysterectomy menopause 1/18/2018    RA (rheumatoid arthritis) ([facility] [address] 10/8/2020    Sciatica 1/18/2018    Visit for screening mammogram 1/18/2018     Past Surgical History:   Procedure Laterality Date    COLONOSCOPY N/A 1/10/2022    COLONOSCOPY  needs rapid covid performed by Rohini [facility]cclellan [facility] at [facility] ENDOSCOPY    HX CATARACT REMOVAL      HX GYN      hysterectomy 1980    HX TONSILLECTOMY      HX UROLOGICAL  2009    URETERAL STENT    NEUROLOGICAL PROCEDURE UNLISTED  6/11    Back; 2 buldging disc fused, bone removed from hip     Allergies   Allergen Reactions    Levaquin [Levofloxacin] Hives     Current Outpatient Medications   Medication Sig Dispense Refill    LORazepam (ATIVAN) 2 mg tablet TAKE TWO TABLETS BY MOUTH DAILY AT BEDTIME 60 Tablet 0    amLODIPine (NORVASC) 5 mg tablet TAKE 1 TABLET BY MOUTH ONE TIME DAILY 90 Tablet 1    rosuvastatin (CRESTOR) 10 mg tablet TAKE 1 TABLET BY MOUTH ONE TIME DAILY 90 Tablet 3    methotrexate (RHEUMATREX) 2.5 mg tablet 20 mg every Monday.  folic acid (FOLVITE) 1 mg tablet Take  by mouth daily.  methotrexate 2.5 mg/mL soln Take  by mouth.  ERGOCALCIFEROL, VITAMIN D2, (VITAMIN D2 PO) Take  by mouth.  Cetirizine (ZYRTEC) 10 mg cap Take  by mouth.  multivitamin (MULTIPLE VITAMIN) tablet Take 1 Tab by mouth daily.  omega-3 fatty acids-vitamin e (FISH OIL) 1,000 mg Cap Take 1 Cap by mouth daily.  aspirin 81 mg tablet Take 81 mg by mouth. Social History     Socioeconomic History    Marital status:    Tobacco Use    Smoking status: Never Smoker    Smokeless tobacco: Never Used   Vaping Use    Vaping Use: Never used   Substance and Sexual Activity    Alcohol use: Yes     Alcohol/week: 10.0 standard drinks     Types: 12 Cans of beer per week    Drug use: Yes     Types: Prescription     Family History   Problem Relation Age of Onset    Heart Disease Father         4 VESSEL CABG    Alcohol abuse Maternal Grandmother     Heart Disease Maternal Grandmother         MI    Alcohol abuse Maternal Grandfather     Asthma Paternal Grandmother        Review of Systems  Constitutional:  negative for fevers, chills, anorexia and weight loss  Eyes:    negative for visual disturbance and irritation  ENT:    negative for tinnitus,sore throat,nasal congestion,ear pains. hoarseness  Respiratory:     negative for cough, hemoptysis, dyspnea,wheezing  CV:    negative for chest pain, palpitations, lower extremity edema  GI:    negative for nausea, vomiting, diarrhea, abdominal pain,melena  Endo:               negative for polyuria,polydipsia,polyphagia,heat intolerance  Genitourinary : negative for frequency, dysuria and hematuria  Integumentary: negative for rash and pruritus  Hematologic:   negative for easy bruising and gum/nose bleeding  Musculoskel:  negative for myalgias, arthralgias, back pain, muscle weakness, joint pain  Neurological:   negative for headaches, dizziness, vertigo, memory problems and gait   Behavl/Psych:  negative for feelings of anxiety, depression, mood changes  ROS otherwise negative      Objective:  Visit Vitals  /80 (BP 1 Location: Left upper arm, BP Patient Position: Sitting, BP Cuff Size: Adult)   Pulse 89   Temp 97.9 °F (36.6 °C) (Oral)   Resp 16   Ht 5' 5\" (1.651 m)   Wt 167 lb 6.4 oz (75.9 kg)   SpO2 98%   BMI 27.86 kg/m²     Physical Exam:   General appearance - alert, well appearing, and in no distress  Mental status - alert, oriented to person, place, and time  EYE-ABDIAZIZ, EOMI, fundi normal, corneas normal, no foreign bodies  ENT-ENT exam normal, no neck nodes or sinus tenderness, thinning of hair especially in the mastoid and occipital region bilaterally  Nose - normal and patent, no erythema, discharge or polyps  Mouth - mucous membranes moist, pharynx normal without lesions  Neck - supple, no significant adenopathy   Chest - clear to auscultation, no wheezes, rales or rhonchi, symmetric air entry   Heart - normal rate, regular rhythm, normal S1, S2, no murmurs, rubs, clicks or gallops   Abdomen - soft, nontender, nondistended, no masses or organomegaly  Lymph- no adenopathy palpable  Ext-peripheral pulses normal, no pedal edema, no clubbing or cyanosis  Skin-Warm and dry. no hyperpigmentation, vitiligo, or suspicious lesions  Neuro -alert, oriented, normal speech, no focal findings or movement disorder noted      Assessment/Plan:  Diagnoses and all orders for this visit:    1. Essential hypertension  -     METABOLIC PANEL, COMPREHENSIVE; Future  -     URINALYSIS W/ RFLX MICROSCOPIC; Future    2. Rheumatoid arthritis involving multiple sites with positive rheumatoid factor (HCC)    3. On statin therapy  -     CK;  Future  - LIPID PANEL; Future  -     METABOLIC PANEL, COMPREHENSIVE; Future    4. Dyslipidemia  -     CK; Future  -     LIPID PANEL; Future  -     METABOLIC PANEL, COMPREHENSIVE; Future    5. Hair loss  -     VITAMIN D, 25 HYDROXY; Future  -     TSH 3RD GENERATION; Future    6. Vitamin D deficiency  -     VITAMIN D, 25 HYDROXY; Future    7. Encounter for screening mammogram for malignant neoplasm of breast  -     [facility] MAMMO BI SCREENING INCL CAD; Future          ICD-10-CM ICD-9-CM    1. Essential hypertension  E62 778.7 METABOLIC PANEL, COMPREHENSIVE      URINALYSIS W/ RFLX MICROSCOPIC      URINALYSIS W/ RFLX MICROSCOPIC      METABOLIC PANEL, COMPREHENSIVE   2. Rheumatoid arthritis involving multiple sites with positive rheumatoid factor (HCC)  M05.79 714.0    3. On statin therapy  Z79.899 V58.69 CK      LIPID PANEL      METABOLIC PANEL, COMPREHENSIVE      METABOLIC PANEL, COMPREHENSIVE      LIPID PANEL      CK   4. Dyslipidemia  E78.5 272.4 CK      LIPID PANEL      METABOLIC PANEL, COMPREHENSIVE      METABOLIC PANEL, COMPREHENSIVE      LIPID PANEL      CK   5. Hair loss  L65.9 704.00 VITAMIN D, 25 HYDROXY      TSH 3RD GENERATION      TSH 3RD GENERATION      VITAMIN D, 25 HYDROXY   6. Vitamin D deficiency  E55.9 268.9 VITAMIN D, 25 HYDROXY      VITAMIN D, 25 HYDROXY   7. Encounter for screening mammogram for malignant neoplasm of breast  Z12.31 V76.12 [facility] MAMMO BI SCREENING INCL CAD     Plan:    Continue current medical regimen as outlined above. Further recommendations based on labs as ordered. The patient is experiencing some hair loss and we will do vitamin D and thyroid to rule out metabolic causes. Is not clear for medication could be contributing to this although I think this is less likely. 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 Plan follow-up in 6 months unless otherwise indicated.     Follow-up and Dispositions · Return in about 6 months (around 10/22/2022). I have reviewed with the patient details of the assessment and plan and all questions were answered. Relevent patient education was performed. Verbal and/or written instructions (see AVS) provided. The most recent lab findings were reviewed with the patient. Plan was discussed with patient who verbally expressed understanding. An After Visit Summary was printed and given to the patient.     Ryne Atkins MD

## 2022-04-22 NOTE — PROGRESS NOTES
Kristopher Douglas is a 71 y.o. female presenting for Follow Up Chronic Condition (6 mo fu)  . 1. Have you been to the ER, urgent care clinic since your last visit? Hospitalized since your last visit? No    2. Have you seen or consulted any other health care providers outside of the [address] [place] [address] since your last visit? Include any pap smears or colon screening. Dr Sergo Mcgrath, last 12 mths 10/8/2020   Able to walk? Yes   Fall in past 12 months? No         Abuse Screening Questionnaire 10/8/2020   Do you ever feel afraid of your partner? N   Are you in a relationship with someone who physically or mentally threatens you? N   Is it safe for you to go home? Y       3 most recent PHQ Screens 10/13/2021   Little interest or pleasure in doing things Not at all   Feeling down, depressed, irritable, or hopeless Not at all   Total Score PHQ 2 0       There are no discontinued medications.

## 2022-04-23 LAB
25(OH)D3 SERPL-MCNC: 38.3 NG/ML (ref 30–100)
ALBUMIN SERPL-MCNC: 4.9 G/DL (ref 3.5–5)
ALBUMIN/GLOB SERPL: 1.4 {RATIO} (ref 1.1–2.2)
ALP SERPL-CCNC: 111 U/L (ref 45–117)
ALT SERPL-CCNC: 65 U/L (ref 12–78)
ANION GAP SERPL CALC-SCNC: 6 MMOL/L (ref 5–15)
APPEARANCE UR: CLEAR
AST SERPL-CCNC: 32 U/L (ref 15–37)
BILIRUB SERPL-MCNC: 0.6 MG/DL (ref 0.2–1)
BILIRUB UR QL: NEGATIVE
BUN SERPL-MCNC: 14 MG/DL (ref 6–20)
BUN/CREAT SERPL: 22 (ref 12–20)
CALCIUM SERPL-MCNC: 10.5 MG/DL (ref 8.5–10.1)
CHLORIDE SERPL-SCNC: 104 MMOL/L (ref 97–108)
CHOLEST SERPL-MCNC: 267 MG/DL
CK SERPL-CCNC: 74 U/L (ref 26–192)
CO2 SERPL-SCNC: 26 MMOL/L (ref 21–32)
COLOR UR: NORMAL
CREAT SERPL-MCNC: 0.65 MG/DL (ref 0.55–1.02)
GLOBULIN SER CALC-MCNC: 3.6 G/DL (ref 2–4)
GLUCOSE SERPL-MCNC: 116 MG/DL (ref 65–100)
GLUCOSE UR STRIP.AUTO-MCNC: NEGATIVE MG/DL
HDLC SERPL-MCNC: 100 MG/DL
HDLC SERPL: 2.7 {RATIO} (ref 0–5)
HGB UR QL STRIP: NEGATIVE
KETONES UR QL STRIP.AUTO: NEGATIVE MG/DL
LDLC SERPL CALC-MCNC: 145.8 MG/DL (ref 0–100)
LEUKOCYTE ESTERASE UR QL STRIP.AUTO: NEGATIVE
NITRITE UR QL STRIP.AUTO: NEGATIVE
PH UR STRIP: 7 [PH] (ref 5–8)
POTASSIUM SERPL-SCNC: 4.8 MMOL/L (ref 3.5–5.1)
PROT SERPL-MCNC: 8.5 G/DL (ref 6.4–8.2)
PROT UR STRIP-MCNC: NEGATIVE MG/DL
SODIUM SERPL-SCNC: 136 MMOL/L (ref 136–145)
SP GR UR REFRACTOMETRY: 1.01 (ref 1–1.03)
TRIGL SERPL-MCNC: 106 MG/DL (ref ?–150)
TSH SERPL DL<=0.05 MIU/L-ACNC: 1.21 UIU/ML (ref 0.36–3.74)
UROBILINOGEN UR QL STRIP.AUTO: 0.2 EU/DL (ref 0.2–1)
VLDLC SERPL CALC-MCNC: 21.2 MG/DL

## 2022-11-09 ENCOUNTER — OFFICE VISIT (OUTPATIENT)
Dept: INTERNAL MEDICINE CLINIC | Age: 72
End: 2022-11-09
Payer: MEDICARE

## 2022-11-09 VITALS
WEIGHT: 170 LBS | HEART RATE: 73 BPM | TEMPERATURE: 98.2 F | SYSTOLIC BLOOD PRESSURE: 120 MMHG | DIASTOLIC BLOOD PRESSURE: 76 MMHG | BODY MASS INDEX: 28.32 KG/M2 | HEIGHT: 65 IN | OXYGEN SATURATION: 98 % | RESPIRATION RATE: 20 BRPM

## 2022-11-09 DIAGNOSIS — F41.1 GAD (GENERALIZED ANXIETY DISORDER): ICD-10-CM

## 2022-11-09 DIAGNOSIS — Z79.899 ON STATIN THERAPY: ICD-10-CM

## 2022-11-09 DIAGNOSIS — Z71.89 ADVANCED DIRECTIVES, COUNSELING/DISCUSSION: ICD-10-CM

## 2022-11-09 DIAGNOSIS — I10 ESSENTIAL HYPERTENSION: ICD-10-CM

## 2022-11-09 DIAGNOSIS — E55.9 VITAMIN D DEFICIENCY: ICD-10-CM

## 2022-11-09 DIAGNOSIS — Z00.00 MEDICARE ANNUAL WELLNESS VISIT, SUBSEQUENT: Primary | ICD-10-CM

## 2022-11-09 DIAGNOSIS — E78.5 DYSLIPIDEMIA: ICD-10-CM

## 2022-11-09 DIAGNOSIS — Z13.31 SCREENING FOR DEPRESSION: ICD-10-CM

## 2022-11-09 DIAGNOSIS — M81.0 AGE-RELATED OSTEOPOROSIS WITHOUT CURRENT PATHOLOGICAL FRACTURE: ICD-10-CM

## 2022-11-09 DIAGNOSIS — Z13.6 SCREENING FOR ISCHEMIC HEART DISEASE: ICD-10-CM

## 2022-11-09 DIAGNOSIS — Z13.1 SCREENING FOR DIABETES MELLITUS: ICD-10-CM

## 2022-11-09 DIAGNOSIS — M05.79 RHEUMATOID ARTHRITIS INVOLVING MULTIPLE SITES WITH POSITIVE RHEUMATOID FACTOR (HCC): ICD-10-CM

## 2022-11-09 PROCEDURE — G8752 SYS BP LESS 140: HCPCS | Performed by: INTERNAL MEDICINE

## 2022-11-09 PROCEDURE — 1101F PT FALLS ASSESS-DOCD LE1/YR: CPT | Performed by: INTERNAL MEDICINE

## 2022-11-09 PROCEDURE — 1123F ACP DISCUSS/DSCN MKR DOCD: CPT | Performed by: INTERNAL MEDICINE

## 2022-11-09 PROCEDURE — 3017F COLORECTAL CA SCREEN DOC REV: CPT | Performed by: INTERNAL MEDICINE

## 2022-11-09 PROCEDURE — G8417 CALC BMI ABV UP PARAM F/U: HCPCS | Performed by: INTERNAL MEDICINE

## 2022-11-09 PROCEDURE — G0439 PPPS, SUBSEQ VISIT: HCPCS | Performed by: INTERNAL MEDICINE

## 2022-11-09 PROCEDURE — G8536 NO DOC ELDER MAL SCRN: HCPCS | Performed by: INTERNAL MEDICINE

## 2022-11-09 PROCEDURE — G8754 DIAS BP LESS 90: HCPCS | Performed by: INTERNAL MEDICINE

## 2022-11-09 PROCEDURE — 3074F SYST BP LT 130 MM HG: CPT | Performed by: INTERNAL MEDICINE

## 2022-11-09 PROCEDURE — G8427 DOCREV CUR MEDS BY ELIG CLIN: HCPCS | Performed by: INTERNAL MEDICINE

## 2022-11-09 PROCEDURE — 3078F DIAST BP <80 MM HG: CPT | Performed by: INTERNAL MEDICINE

## 2022-11-09 PROCEDURE — G8510 SCR DEP NEG, NO PLAN REQD: HCPCS | Performed by: INTERNAL MEDICINE

## 2022-11-09 RX ORDER — METOPROLOL SUCCINATE 25 MG/1
25 TABLET, EXTENDED RELEASE ORAL DAILY
COMMUNITY
Start: 2022-10-22

## 2022-11-09 NOTE — PATIENT INSTRUCTIONS

## 2022-11-09 NOTE — PROGRESS NOTES
This is the Subsequent Medicare Annual Wellness Exam, performed 12 months or more after the Initial AWV or the last Subsequent AWV    I have reviewed the patient's medical history in detail and updated the computerized patient record. Assessment/Plan   Education and counseling provided:  Are appropriate based on today's review and evaluation    1. Medicare annual wellness visit, subsequent  2. JEAN (generalized anxiety disorder)  3. Essential hypertension  -     LIPID PANEL; Future  -     METABOLIC PANEL, COMPREHENSIVE; Future  -     URINALYSIS W/ RFLX MICROSCOPIC; Future  4. Rheumatoid arthritis involving multiple sites with positive rheumatoid factor ([facility] [address]  5. Dyslipidemia  -     CK; Future  -     LIPID PANEL; Future  -     METABOLIC PANEL, COMPREHENSIVE; Future  6. On statin therapy  -     CK; Future  -     LIPID PANEL; Future  -     METABOLIC PANEL, COMPREHENSIVE; Future  7. Vitamin D deficiency  -     VITAMIN D, 25 HYDROXY; Future  8. Age-related osteoporosis without current pathological fracture  9. Advanced directives, counseling/discussion  10. Screening for diabetes mellitus  11. Screening for ischemic heart disease  12. Screening for depression  -     DEPRESSION SCREEN ANNUAL       Depression Risk Factor Screening     3 most recent PHQ Screens 11/9/2022   Little interest or pleasure in doing things Not at all   Feeling down, depressed, irritable, or hopeless Not at all   Total Score PHQ 2 0       Alcohol & Drug Abuse Risk Screen    Do you average more than 1 drink per night or more than 7 drinks a week:  No    On any one occasion in the past three months have you have had more than 3 drinks containing alcohol:  No          Functional Ability and Level of Safety    Hearing: Hearing is good. Activities of Daily Living: The home contains: no safety equipment.   Patient does total self care      Ambulation: with no difficulty     Fall Risk:  Fall Risk Assessment, last 12 mths 11/9/2022   Able to walk? Yes   Fall in past 12 months? 0   Do you feel unsteady?  0   Are you worried about falling 0      Abuse Screen:  Patient is not abused       Cognitive Screening    Has your family/caregiver stated any concerns about your memory: no     Cognitive Screening: Normal - Verbal Fluency Test    Health Maintenance Due     Health Maintenance Due   Topic Date Due    DTaP/Tdap/Td series (1 - Tdap) Never done    Shingrix Vaccine Age 50> (1 of 2) Never done    Pneumococcal 65+ years (1 - PCV) Never done    Breast Cancer Screen Mammogram  02/06/2020    COVID-19 Vaccine (3 - Booster for Tahir Martell series) 07/12/2021    Flu Vaccine (1) Never done       Patient Care Team   Patient Care Team:  Carine Warner MD as PCP - General (Internal Medicine Physician)  Carine Warner MD [facility] P[facility]    History     Patient Active Problem List   Diagnosis Code    Spinal stenosis, lumbar region, with neurogenic claudication M48.062    Nephrolithiasis N20.0    EBT (electron beam tomography) abnormal R94.39    Inflammatory bowel disease K52.9    Post hysterectomy menopause E89.40, Z90.710    On statin therapy Z79.899    Dyslipidemia E78.5    Cataract H26.9    Chronic insomnia F51.04    Fibrocystic breast changes N60.19    Sciatica M54.30    Acute lower UTI N39.0    Essential hypertension I10    Rheumatoid arthritis involving multiple sites with positive rheumatoid factor ([facility] [address] M05.79    Age-related osteoporosis without current pathological fracture M81.0     Past Medical History:   Diagnosis Date    Acute lower UTI 1/18/2018    Age-related osteoporosis without current pathological fracture 11/9/2022    Annual physical exam 1/18/2018    Arrhythmia 2000    TACHYCARDIA RHYTHM x1 episode 2000    Arthritis     spinal stenosis; previous spine surgery    Cataract 1/18/2018    Chronic insomnia 1/18/2018    Dyslipidemia 1/18/2018    EBT (electron beam tomography) abnormal 1/18/2018    Fibrocystic breast changes 1/18/2018 High cholesterol     HTN (hypertension) 1/18/2018    Inflammatory bowel disease 1/18/2018    Nephrolithiasis 1/18/2018    On statin therapy 1/18/2018    Post hysterectomy menopause 1/18/2018    RA (rheumatoid arthritis) ([facility] [address] 10/8/2020    Sciatica 1/18/2018    Visit for screening mammogram 1/18/2018      Past Surgical History:   Procedure Laterality Date    COLONOSCOPY N/A 1/10/2022    COLONOSCOPY  needs rapid covid performed by Marlene Martinez MD at [facility] ENDOSCOPY    HX CATARACT REMOVAL      HX GYN      hysterectomy 1980    HX TONSILLECTOMY      HX UROLOGICAL  2009    URETERAL STENT    NEUROLOGICAL PROCEDURE UNLISTED  6/11    Back; 2 buldging disc fused, bone removed from hip     Current Outpatient Medications   Medication Sig Dispense Refill    metoprolol succinate (TOPROL-XL) 25 mg XL tablet Take 25 mg by mouth daily. LORazepam (ATIVAN) 2 mg tablet TAKE TWO TABLETS BY MOUTH DAILY AT BEDTIME 60 Tablet 2    rosuvastatin (CRESTOR) 10 mg tablet TAKE 1 TABLET BY MOUTH ONE TIME DAILY 90 Tablet 3    methotrexate (RHEUMATREX) 2.5 mg tablet 20 mg every Monday. folic acid (FOLVITE) 1 mg tablet Take  by mouth daily. ERGOCALCIFEROL, VITAMIN D2, (VITAMIN D2 PO) Take  by mouth. Cetirizine 10 mg cap Take  by mouth.      multivitamin (ONE A DAY) tablet Take 1 Tab by mouth daily. omega-3 fatty acids-vitamin e 1,000 mg cap Take 1 Cap by mouth daily. aspirin 81 mg tablet Take 81 mg by mouth. amLODIPine (NORVASC) 5 mg tablet TAKE 1 TABLET BY MOUTH ONE TIME DAILY (Patient not taking: Reported on 11/9/2022) 90 Tablet 3    methotrexate 2.5 mg/mL soln Take  by mouth.  (Patient not taking: Reported on 11/9/2022)       Allergies   Allergen Reactions    Levaquin [Levofloxacin] Hives       Family History   Problem Relation Age of Onset    Heart Disease Father         4 VESSEL CABG    Alcohol abuse Maternal Grandmother     Heart Disease Maternal Grandmother         MI    Alcohol abuse Maternal Grandfather Asthma Paternal Grandmother      Social History     Tobacco Use    Smoking status: Never    Smokeless tobacco: Never   Substance Use Topics    Alcohol use: Yes     Alcohol/week: 10.0 standard drinks     Types: 12 Cans of beer per week         VIJAY Garvey MD           Raman Sauer is a 71 y.o. female and presents with Follow Up Chronic Condition (6 mo fu) and Annual Wellness Visit  . Subjective:  Mrs. Errol Lewis presents today for Medicare annual wellness review as well as follow-up of several medical problems including hypertension, tachycardia, dyslipidemia, monitoring statin therapy, and history of rheumatoid arthritis. She continues to follow-up with her rheumatologist on a regular basis. She remains on methotrexate. She had an episode with tachycardia while in [place] which lasted for approximately an hour and a half. She was seen by cardiology on follow-up and had further testing including echocardiogram and a stress test.  She is now on low-dose metoprolol succinate 25 mg daily. She continues to take lorazepam on a as needed basis.     Past Medical History:   Diagnosis Date    Acute lower UTI 1/18/2018    Age-related osteoporosis without current pathological fracture 11/9/2022    Annual physical exam 1/18/2018    Arrhythmia 2000    TACHYCARDIA RHYTHM x1 episode 2000    Arthritis     spinal stenosis; previous spine surgery    Cataract 1/18/2018    Chronic insomnia 1/18/2018    Dyslipidemia 1/18/2018    EBT (electron beam tomography) abnormal 1/18/2018    Fibrocystic breast changes 1/18/2018    High cholesterol     HTN (hypertension) 1/18/2018    Inflammatory bowel disease 1/18/2018    Nephrolithiasis 1/18/2018    On statin therapy 1/18/2018    Post hysterectomy menopause 1/18/2018    RA (rheumatoid arthritis) ([facility] [address] 10/8/2020    Sciatica 1/18/2018    Visit for screening mammogram 1/18/2018     Past Surgical History:   Procedure Laterality Date    COLONOSCOPY N/A 1/10/2022    COLONOSCOPY  needs rapid covid performed by Prakash Gardner MD at [facility] ENDOSCOPY    HX CATARACT REMOVAL      HX GYN      hysterectomy 1980    HX TONSILLECTOMY      HX UROLOGICAL  2009    URETERAL STENT    NEUROLOGICAL PROCEDURE UNLISTED  6/11    Back; 2 buldging disc fused, bone removed from hip     Allergies   Allergen Reactions    Levaquin [Levofloxacin] Hives     Current Outpatient Medications   Medication Sig Dispense Refill    metoprolol succinate (TOPROL-XL) 25 mg XL tablet Take 25 mg by mouth daily. LORazepam (ATIVAN) 2 mg tablet TAKE TWO TABLETS BY MOUTH DAILY AT BEDTIME 60 Tablet 2    rosuvastatin (CRESTOR) 10 mg tablet TAKE 1 TABLET BY MOUTH ONE TIME DAILY 90 Tablet 3    methotrexate (RHEUMATREX) 2.5 mg tablet 20 mg every Monday. folic acid (FOLVITE) 1 mg tablet Take  by mouth daily. ERGOCALCIFEROL, VITAMIN D2, (VITAMIN D2 PO) Take  by mouth. Cetirizine 10 mg cap Take  by mouth.      multivitamin (ONE A DAY) tablet Take 1 Tab by mouth daily. omega-3 fatty acids-vitamin e 1,000 mg cap Take 1 Cap by mouth daily. aspirin 81 mg tablet Take 81 mg by mouth. amLODIPine (NORVASC) 5 mg tablet TAKE 1 TABLET BY MOUTH ONE TIME DAILY (Patient not taking: Reported on 11/9/2022) 90 Tablet 3    methotrexate 2.5 mg/mL soln Take  by mouth. (Patient not taking: Reported on 11/9/2022)       Social History     Socioeconomic History    Marital status:    Tobacco Use    Smoking status: Never    Smokeless tobacco: Never   Vaping Use    Vaping Use: Never used   Substance and Sexual Activity    Alcohol use:  Yes     Alcohol/week: 10.0 standard drinks     Types: 12 Cans of beer per week    Drug use: Yes     Types: Prescription     Family History   Problem Relation Age of Onset    Heart Disease Father         4 VESSEL CABG    Alcohol abuse Maternal Grandmother     Heart Disease Maternal Grandmother         MI    Alcohol abuse Maternal Grandfather     Asthma Paternal Grandmother        Review of Systems  Constitutional:  negative for fevers, chills, anorexia and weight loss  Eyes:    negative for visual disturbance and irritation  ENT:    negative for tinnitus,sore throat,nasal congestion,ear pains. hoarseness  Respiratory:     negative for cough, hemoptysis, dyspnea,wheezing  CV:    negative for chest pain, palpitations, lower extremity edema  GI:    negative for nausea, vomiting, diarrhea, abdominal pain,melena  Endo:               negative for polyuria,polydipsia,polyphagia,heat intolerance  Genitourinary : negative for frequency, dysuria and hematuria  Integumentary: negative for rash and pruritus  Hematologic:   negative for easy bruising and gum/nose bleeding  Musculoskel:  negative for myalgias, arthralgias, back pain, muscle weakness, joint pain  Neurological:   negative for headaches, dizziness, vertigo, memory problems and gait   Behavl/Psych:  negative for feelings of anxiety, depression, mood changes  ROS otherwise negative      Objective:  Visit Vitals  /76 (BP 1 Location: Left upper arm, BP Patient Position: Sitting, BP Cuff Size: Adult)   Pulse 73   Temp 98.2 °F (36.8 °C) (Oral)   Resp 20   Ht 5' 5\" (1.651 m)   Wt 170 lb (77.1 kg)   SpO2 98%   BMI 28.29 kg/m²     Physical Exam:   General appearance - alert, well appearing, and in no distress  Mental status - alert, oriented to person, place, and time  EYE-ABDIAZIZ, EOMI, fundi normal, corneas normal, no foreign bodies  ENT-ENT exam normal, no neck nodes or sinus tenderness  Nose - normal and patent, no erythema, discharge or polyps  Mouth - mucous membranes moist, pharynx normal without lesions  Neck - supple, no significant adenopathy   Chest - clear to auscultation, no wheezes, rales or rhonchi, symmetric air entry   Heart - normal rate, regular rhythm, normal S1, S2, no murmurs, rubs, clicks or gallops   Abdomen - soft, nontender, nondistended, no masses or organomegaly  Lymph- no adenopathy palpable  Ext-peripheral pulses normal, no pedal edema, no clubbing or cyanosis  Skin-Warm and dry. no hyperpigmentation, vitiligo, or suspicious lesions  Neuro -alert, oriented, normal speech, no focal findings or movement disorder noted      Assessment/Plan:  Diagnoses and all orders for this visit:    1. Medicare annual wellness visit, subsequent    2. JEAN (generalized anxiety disorder)    3. Essential hypertension  -     LIPID PANEL; Future  -     METABOLIC PANEL, COMPREHENSIVE; Future  -     URINALYSIS W/ RFLX MICROSCOPIC; Future    4. Rheumatoid arthritis involving multiple sites with positive rheumatoid factor ([facility] [address]    5. Dyslipidemia  -     CK; Future  -     LIPID PANEL; Future  -     METABOLIC PANEL, COMPREHENSIVE; Future    6. On statin therapy  -     CK; Future  -     LIPID PANEL; Future  -     METABOLIC PANEL, COMPREHENSIVE; Future    7. Vitamin D deficiency  -     VITAMIN D, 25 HYDROXY; Future    8. Age-related osteoporosis without current pathological fracture    9. Advanced directives, counseling/discussion    10. Screening for diabetes mellitus    11. Screening for ischemic heart disease    12. Screening for depression  -     Emilie 69          ICD-10-CM ICD-9-CM    1. Medicare annual wellness visit, subsequent  Z00.00 V70.0       2. JEAN (generalized anxiety disorder)  F41.1 300.02       3. Essential hypertension  I10 401.9 LIPID PANEL      METABOLIC PANEL, COMPREHENSIVE      URINALYSIS W/ RFLX MICROSCOPIC      4. Rheumatoid arthritis involving multiple sites with positive rheumatoid factor ([facility])  M05.79 714.0       5. Dyslipidemia  E78.5 272.4 CK      LIPID PANEL      METABOLIC PANEL, COMPREHENSIVE      6. On statin therapy  Z79.899 V58.69 CK      LIPID PANEL      METABOLIC PANEL, COMPREHENSIVE      7. Vitamin D deficiency  E55.9 268.9 VITAMIN D, 25 HYDROXY      8. Age-related osteoporosis without current pathological fracture  M81.0 733.01       9. Advanced directives, counseling/discussion  Z71.89 V65.49       10.  Screening for diabetes mellitus  Z13.1 V77.1       11. Screening for ischemic heart disease  Z13.6 V81.0       12. Screening for depression  Z13.31 V79.0 DEPRESSION SCREEN ANNUAL      Plan:    Continue current medical regimen as outlined above. Further recommendations based on labs as ordered. She does have an apple watch and may have the ability to do a rhythm tracing or EKG if she has another episode. I have reviewed with the patient details of the assessment and plan and all questions were answered. Relevent patient education was performed. Verbal and/or written instructions (see AVS) provided. The most recent lab findings were reviewed with the patient. Plan was discussed with patient who verbally expressed understanding. An After Visit Summary was printed and given to the patient.     Shashank Doan MD

## 2022-11-10 LAB
25(OH)D3 SERPL-MCNC: 52.4 NG/ML (ref 30–100)
ALBUMIN SERPL-MCNC: 4 G/DL (ref 3.5–5)
ALBUMIN/GLOB SERPL: 1.1 {RATIO} (ref 1.1–2.2)
ALP SERPL-CCNC: 117 U/L (ref 45–117)
ALT SERPL-CCNC: 63 U/L (ref 12–78)
ANION GAP SERPL CALC-SCNC: 7 MMOL/L (ref 5–15)
APPEARANCE UR: CLEAR
AST SERPL-CCNC: 36 U/L (ref 15–37)
BILIRUB SERPL-MCNC: 0.3 MG/DL (ref 0.2–1)
BILIRUB UR QL: NEGATIVE
BUN SERPL-MCNC: 12 MG/DL (ref 6–20)
BUN/CREAT SERPL: 18 (ref 12–20)
CALCIUM SERPL-MCNC: 9 MG/DL (ref 8.5–10.1)
CHLORIDE SERPL-SCNC: 106 MMOL/L (ref 97–108)
CHOLEST SERPL-MCNC: 228 MG/DL
CK SERPL-CCNC: 72 U/L (ref 26–192)
CO2 SERPL-SCNC: 24 MMOL/L (ref 21–32)
COLOR UR: NORMAL
CREAT SERPL-MCNC: 0.68 MG/DL (ref 0.55–1.02)
GLOBULIN SER CALC-MCNC: 3.5 G/DL (ref 2–4)
GLUCOSE SERPL-MCNC: 126 MG/DL (ref 65–100)
GLUCOSE UR STRIP.AUTO-MCNC: NEGATIVE MG/DL
HDLC SERPL-MCNC: 61 MG/DL
HDLC SERPL: 3.7 {RATIO} (ref 0–5)
HGB UR QL STRIP: NEGATIVE
KETONES UR QL STRIP.AUTO: NEGATIVE MG/DL
LDLC SERPL CALC-MCNC: 129.8 MG/DL (ref 0–100)
LEUKOCYTE ESTERASE UR QL STRIP.AUTO: NEGATIVE
NITRITE UR QL STRIP.AUTO: NEGATIVE
PH UR STRIP: 7 [PH] (ref 5–8)
POTASSIUM SERPL-SCNC: 5.2 MMOL/L (ref 3.5–5.1)
PROT SERPL-MCNC: 7.5 G/DL (ref 6.4–8.2)
PROT UR STRIP-MCNC: NEGATIVE MG/DL
SODIUM SERPL-SCNC: 137 MMOL/L (ref 136–145)
SP GR UR REFRACTOMETRY: 1.01 (ref 1–1.03)
TRIGL SERPL-MCNC: 186 MG/DL (ref ?–150)
UROBILINOGEN UR QL STRIP.AUTO: 0.2 EU/DL (ref 0.2–1)
VLDLC SERPL CALC-MCNC: 37.2 MG/DL

## 2022-11-30 NOTE — TELEPHONE ENCOUNTER
Patient calling to see if she can get her Lorazepam filled today. Tonya advised her it was one day to soon and that she would need to get it approved by Dr. Nori Calles. [place] What to expect if we're setting up an injection/procedure    - I have placed the order today, we'll start speaking to your insurance for authorization (this can sometimes take a few weeks).   - You should be scheduled for your procedure before you leave the office.  If you were not, please call our office to schedule.   - If you have any symptoms of an infection or of COVID, please reschedule your procedure.   - LIGHT Intravenous (IV) sedation is offered for some procedures: you will NOT be put to sleep.  If you plan to have sedation, do not eat or drink anything on the day of your injection.   - Most procedures require having someone drive you.  Please make sure you arrange a  unless told otherwise.   - If you take a blood thinner and you were not instructed whether to continue or hold it, please contact us with any questions.

## 2022-12-12 ENCOUNTER — APPOINTMENT (OUTPATIENT)
Dept: GENERAL RADIOLOGY | Age: 72
End: 2022-12-12
Attending: EMERGENCY MEDICINE
Payer: MEDICARE

## 2022-12-12 ENCOUNTER — HOSPITAL ENCOUNTER (EMERGENCY)
Age: 72
Discharge: HOME | End: 2022-12-12
Attending: EMERGENCY MEDICINE
Payer: MEDICARE

## 2022-12-12 VITALS
SYSTOLIC BLOOD PRESSURE: 154 MMHG | WEIGHT: 168 LBS | OXYGEN SATURATION: 98 % | TEMPERATURE: 98 F | RESPIRATION RATE: 22 BRPM | DIASTOLIC BLOOD PRESSURE: 80 MMHG | HEIGHT: 64 IN | HEART RATE: 91 BPM | BODY MASS INDEX: 28.68 KG/M2

## 2022-12-12 DIAGNOSIS — I47.1 SVT (SUPRAVENTRICULAR TACHYCARDIA) (HCC): Primary | ICD-10-CM

## 2022-12-12 LAB
ALBUMIN SERPL-MCNC: 4.2 G/DL (ref 3.5–5)
ALBUMIN/GLOB SERPL: 1 {RATIO} (ref 1.1–2.2)
ALP SERPL-CCNC: 97 U/L (ref 45–117)
ALT SERPL-CCNC: 51 U/L (ref 12–78)
ANION GAP SERPL CALC-SCNC: 9 MMOL/L (ref 5–15)
AST SERPL-CCNC: 27 U/L (ref 15–37)
BASOPHILS # BLD: 0 K/UL (ref 0–0.1)
BASOPHILS NFR BLD: 1 % (ref 0–1)
BILIRUB SERPL-MCNC: 0.5 MG/DL (ref 0.2–1)
BUN SERPL-MCNC: 14 MG/DL (ref 6–20)
BUN/CREAT SERPL: 15 (ref 12–20)
CALCIUM SERPL-MCNC: 9.5 MG/DL (ref 8.5–10.1)
CHLORIDE SERPL-SCNC: 105 MMOL/L (ref 97–108)
CO2 SERPL-SCNC: 25 MMOL/L (ref 21–32)
CREAT SERPL-MCNC: 0.93 MG/DL (ref 0.55–1.02)
DIFFERENTIAL METHOD BLD: ABNORMAL
EOSINOPHIL # BLD: 0.1 K/UL (ref 0–0.4)
EOSINOPHIL NFR BLD: 1 % (ref 0–7)
ERYTHROCYTE [DISTWIDTH] IN BLOOD BY AUTOMATED COUNT: 13.2 % (ref 11.5–14.5)
GLOBULIN SER CALC-MCNC: 4.1 G/DL (ref 2–4)
GLUCOSE SERPL-MCNC: 102 MG/DL (ref 65–100)
HCT VFR BLD AUTO: 43.6 % (ref 35–47)
HGB BLD-MCNC: 15.2 G/DL (ref 11.5–16)
IMM GRANULOCYTES # BLD AUTO: 0 K/UL (ref 0–0.04)
IMM GRANULOCYTES NFR BLD AUTO: 1 % (ref 0–0.5)
LYMPHOCYTES # BLD: 0.9 K/UL (ref 0.8–3.5)
LYMPHOCYTES NFR BLD: 15 % (ref 12–49)
MAGNESIUM SERPL-MCNC: 1.8 MG/DL (ref 1.6–2.4)
MCH RBC QN AUTO: 33.6 PG (ref 26–34)
MCHC RBC AUTO-ENTMCNC: 34.9 G/DL (ref 30–36.5)
MCV RBC AUTO: 96.2 FL (ref 80–99)
MONOCYTES # BLD: 1 K/UL (ref 0–1)
MONOCYTES NFR BLD: 15 % (ref 5–13)
NEUTS SEG # BLD: 4.4 K/UL (ref 1.8–8)
NEUTS SEG NFR BLD: 67 % (ref 32–75)
NRBC # BLD: 0 K/UL (ref 0–0.01)
NRBC BLD-RTO: 0 PER 100 WBC
PLATELET # BLD AUTO: 258 K/UL (ref 150–400)
PMV BLD AUTO: 9 FL (ref 8.9–12.9)
POTASSIUM SERPL-SCNC: 3.6 MMOL/L (ref 3.5–5.1)
PROT SERPL-MCNC: 8.3 G/DL (ref 6.4–8.2)
RBC # BLD AUTO: 4.53 M/UL (ref 3.8–5.2)
SODIUM SERPL-SCNC: 139 MMOL/L (ref 136–145)
TROPONIN-HIGH SENSITIVITY: 16 NG/L (ref 0–51)
TSH SERPL DL<=0.05 MIU/L-ACNC: 1.01 UIU/ML (ref 0.36–3.74)
WBC # BLD AUTO: 6.4 K/UL (ref 3.6–11)

## 2022-12-12 PROCEDURE — 83735 ASSAY OF MAGNESIUM: CPT

## 2022-12-12 PROCEDURE — 74011000250 HC RX REV CODE- 250: Performed by: EMERGENCY MEDICINE

## 2022-12-12 PROCEDURE — 74011250637 HC RX REV CODE- 250/637: Performed by: EMERGENCY MEDICINE

## 2022-12-12 PROCEDURE — 84443 ASSAY THYROID STIM HORMONE: CPT

## 2022-12-12 PROCEDURE — 84484 ASSAY OF TROPONIN QUANT: CPT

## 2022-12-12 PROCEDURE — 36415 COLL VENOUS BLD VENIPUNCTURE: CPT

## 2022-12-12 PROCEDURE — 80053 COMPREHEN METABOLIC PANEL: CPT

## 2022-12-12 PROCEDURE — 99285 EMERGENCY DEPT VISIT HI MDM: CPT

## 2022-12-12 PROCEDURE — 71045 X-RAY EXAM CHEST 1 VIEW: CPT

## 2022-12-12 PROCEDURE — 93005 ELECTROCARDIOGRAM TRACING: CPT

## 2022-12-12 PROCEDURE — 85025 COMPLETE CBC W/AUTO DIFF WBC: CPT

## 2022-12-12 RX ORDER — SODIUM CHLORIDE 0.9 % (FLUSH) 0.9 %
5-40 SYRINGE (ML) INJECTION AS NEEDED
Status: DISCONTINUED | OUTPATIENT
Start: 2022-12-12 | End: 2022-12-12 | Stop reason: HOSPADM

## 2022-12-12 RX ORDER — METOPROLOL SUCCINATE 50 MG/1
25 TABLET, EXTENDED RELEASE ORAL DAILY
Qty: 30 TABLET | Refills: 2 | Status: SHIPPED | OUTPATIENT
Start: 2022-12-12

## 2022-12-12 RX ORDER — METOPROLOL TARTRATE 25 MG/1
25 TABLET, FILM COATED ORAL
Status: COMPLETED | OUTPATIENT
Start: 2022-12-12 | End: 2022-12-12

## 2022-12-12 RX ORDER — SODIUM CHLORIDE 0.9 % (FLUSH) 0.9 %
5-40 SYRINGE (ML) INJECTION EVERY 8 HOURS
Status: DISCONTINUED | OUTPATIENT
Start: 2022-12-12 | End: 2022-12-12 | Stop reason: HOSPADM

## 2022-12-12 RX ADMIN — METOPROLOL TARTRATE 25 MG: 25 TABLET, FILM COATED ORAL at 14:46

## 2022-12-12 RX ADMIN — SODIUM CHLORIDE, PRESERVATIVE FREE 10 ML: 5 INJECTION INTRAVENOUS at 14:47

## 2022-12-12 NOTE — ED PROVIDER NOTES
EMERGENCY DEPARTMENT HISTORY AND PHYSICAL EXAM      Date: 12/12/2022  Patient Name: Anant Shaikh    History of Presenting Illness     Chief Complaint   Patient presents with    SVT     Pt arrives to Ed via EMS. Pt was at home when her heart started to race. EMS placed her on a heart monitor gave 6 mg of adenosine and pt converted. History Provided By: Patient    HPI: Anant Shaikh, 71 y.o. female presents to the ED with cc of SVT. Patient states that she does have a history of SVT. They have never been able to catch it on EKG. She states that she had a sudden onset of palpitations today and called EMS immediately. Upon arrival EMS stated heart rate in the 1  range they had attempted vagal maneuvers to no success and administered 6 mg of adenosine. Patient converted after the adenosine patient arrived to the emergency department without any complaint. She states that initially the palpitations were there she had some mild shortness of breath. She states that she denies any chest pain at the time. She denies any recent illness to include fever chills, cough or cold symptoms. She denies any abdominal pain, nausea, vomiting or diarrhea. There is been no recent pain or swelling lower extremities. There are no other complaints, changes, or physical findings at this time. PCP: Amelia Crowe MD    No current facility-administered medications on file prior to encounter. Current Outpatient Medications on File Prior to Encounter   Medication Sig Dispense Refill    rosuvastatin (CRESTOR) 10 mg tablet TAKE 1 TABLET BY MOUTH ONE TIME DAILY 90 Tablet 1    [DISCONTINUED] metoprolol succinate (TOPROL-XL) 25 mg XL tablet Take 25 mg by mouth daily.       LORazepam (ATIVAN) 2 mg tablet TAKE TWO TABLETS BY MOUTH DAILY AT BEDTIME 60 Tablet 2    amLODIPine (NORVASC) 5 mg tablet TAKE 1 TABLET BY MOUTH ONE TIME DAILY (Patient not taking: No sig reported) 90 Tablet 3    methotrexate (RHEUMATREX) 2.5 mg tablet 20 mg every Monday. folic acid (FOLVITE) 1 mg tablet Take  by mouth daily. methotrexate 2.5 mg/mL soln Take  by mouth. (Patient not taking: Reported on 11/9/2022)      ERGOCALCIFEROL, VITAMIN D2, (VITAMIN D2 PO) Take  by mouth. Cetirizine 10 mg cap Take  by mouth.      multivitamin (ONE A DAY) tablet Take 1 Tab by mouth daily. omega-3 fatty acids-vitamin e 1,000 mg cap Take 1 Cap by mouth daily. aspirin 81 mg tablet Take 81 mg by mouth.          Past History     Past Medical History:  Past Medical History:   Diagnosis Date    Acute lower UTI 1/18/2018    Age-related osteoporosis without current pathological fracture 11/9/2022    Annual physical exam 1/18/2018    Arrhythmia 2000    TACHYCARDIA RHYTHM x1 episode 2000    Arthritis     spinal stenosis; previous spine surgery    Cataract 1/18/2018    Chronic insomnia 1/18/2018    Dyslipidemia 1/18/2018    EBT (electron beam tomography) abnormal 1/18/2018    Fibrocystic breast changes 1/18/2018    High cholesterol     HTN (hypertension) 1/18/2018    Inflammatory bowel disease 1/18/2018    Nephrolithiasis 1/18/2018    On statin therapy 1/18/2018    Post hysterectomy menopause 1/18/2018    RA (rheumatoid arthritis) ([facility] [address] 10/8/2020    Sciatica 1/18/2018    Visit for screening mammogram 1/18/2018       Past Surgical History:  Past Surgical History:   Procedure Laterality Date    COLONOSCOPY N/A 1/10/2022    COLONOSCOPY  needs rapid covid performed by Stacey Wakefield MD at [facility] ENDOSCOPY    HX CATARACT REMOVAL      HX GYN      hysterectomy 1980    HX TONSILLECTOMY      HX UROLOGICAL  2009    URETERAL STENT    NEUROLOGICAL PROCEDURE UNLISTED  6/11    Back; 2 buldging disc fused, bone removed from hip       Family History:  Family History   Problem Relation Age of Onset    Heart Disease Father         4 VESSEL CABG    Alcohol abuse Maternal Grandmother     Heart Disease Maternal Grandmother         MI    Alcohol abuse Maternal Grandfather     Asthma Paternal Grandmother        Social History:  Social History     Tobacco Use    Smoking status: Never    Smokeless tobacco: Never   Vaping Use    Vaping Use: Never used   Substance Use Topics    Alcohol use: Yes     Alcohol/week: 10.0 standard drinks     Types: 12 Cans of beer per week    Drug use: Yes     Types: Prescription       Allergies: Allergies   Allergen Reactions    Levaquin [Levofloxacin] Hives         Review of Systems   Review of Systems   Constitutional: Negative. Negative for appetite change, chills, fatigue and fever. HENT: Negative. Negative for congestion, rhinorrhea, sinus pressure and sore throat. Eyes: Negative. Respiratory:  Positive for shortness of breath. Negative for cough, choking, chest tightness and wheezing. Cardiovascular:  Positive for palpitations. Negative for chest pain and leg swelling. Gastrointestinal:  Negative for abdominal pain, constipation, diarrhea, nausea and vomiting. Endocrine: Negative. Genitourinary: Negative. Negative for difficulty urinating, dysuria, flank pain and urgency. Musculoskeletal: Negative. Skin: Negative. Neurological: Negative. Negative for dizziness, speech difficulty, weakness, light-headedness, numbness and headaches. Psychiatric/Behavioral: Negative. All other systems reviewed and are negative. Physical Exam   Physical Exam  Vitals and nursing note reviewed. Constitutional:       General: She is not in acute distress. Appearance: She is well-developed. She is obese. She is not diaphoretic. HENT:      Head: Normocephalic and atraumatic. Mouth/Throat:      Pharynx: No oropharyngeal exudate. Eyes:      Extraocular Movements: Extraocular movements intact. Conjunctiva/sclera: Conjunctivae normal.      Pupils: Pupils are equal, round, and reactive to light. Neck:      Vascular: No JVD. Trachea: No tracheal deviation.    Cardiovascular:      Rate and Rhythm: Normal rate and regular rhythm. Heart sounds: Normal heart sounds. No murmur heard. Pulmonary:      Effort: Pulmonary effort is normal. No respiratory distress. Breath sounds: Normal breath sounds. No stridor. No wheezing or rales. Abdominal:      General: There is no distension. Palpations: Abdomen is soft. Tenderness: There is no abdominal tenderness. There is no guarding or rebound. Musculoskeletal:         General: Normal range of motion. Cervical back: Normal range of motion and neck supple. Right lower leg: No edema. Left lower leg: No edema. Skin:     General: Skin is warm and dry. Capillary Refill: Capillary refill takes less than 2 seconds. Neurological:      Mental Status: She is alert and oriented to person, place, and time. Cranial Nerves: No cranial nerve deficit. Comments: No gross motor or sensory deficits    Psychiatric:         Mood and Affect: Mood normal.         Behavior: Behavior normal.       Diagnostic Study Results     Labs -     Recent Results (from the past 12 hour(s))   EKG, 12 LEAD, INITIAL    Collection Time: 12/12/22  2:33 PM   Result Value Ref Range    Ventricular Rate 101 BPM    Atrial Rate 101 BPM    P-R Interval 174 ms    QRS Duration 86 ms    Q-T Interval 374 ms    QTC Calculation (Bezet) 484 ms    Calculated P Axis 52 degrees    Calculated R Axis -9 degrees    Calculated T Axis 46 degrees    Diagnosis       Sinus tachycardia  Possible Left atrial enlargement  Nonspecific ST abnormality  When compared with ECG of 26-MAY-2011 15:51,  Vent.  rate has increased BY  36 BPM     CBC WITH AUTOMATED DIFF    Collection Time: 12/12/22  2:38 PM   Result Value Ref Range    WBC 6.4 3.6 - 11.0 K/uL    RBC 4.53 3.80 - 5.20 M/uL    HGB 15.2 11.5 - 16.0 g/dL    HCT 43.6 35.0 - 47.0 %    MCV 96.2 80.0 - 99.0 FL    MCH 33.6 26.0 - 34.0 PG    MCHC 34.9 30.0 - 36.5 g/dL    RDW 13.2 11.5 - 14.5 %    PLATELET 231 605 - 328 K/uL    MPV 9.0 8.9 - 12.9 FL    NRBC 0.0 0  WBC    ABSOLUTE NRBC 0.00 0.00 - 0.01 K/uL    NEUTROPHILS 67 32 - 75 %    LYMPHOCYTES 15 12 - 49 %    MONOCYTES 15 (H) 5 - 13 %    EOSINOPHILS 1 0 - 7 %    BASOPHILS 1 0 - 1 %    IMMATURE GRANULOCYTES 1 (H) 0.0 - 0.5 %    ABS. NEUTROPHILS 4.4 1.8 - 8.0 K/UL    ABS. LYMPHOCYTES 0.9 0.8 - 3.5 K/UL    ABS. MONOCYTES 1.0 0.0 - 1.0 K/UL    ABS. EOSINOPHILS 0.1 0.0 - 0.4 K/UL    ABS. BASOPHILS 0.0 0.0 - 0.1 K/UL    ABS. IMM. GRANS. 0.0 0.00 - 0.04 K/UL    DF AUTOMATED     METABOLIC PANEL, COMPREHENSIVE    Collection Time: 12/12/22  2:38 PM   Result Value Ref Range    Sodium 139 136 - 145 mmol/L    Potassium 3.6 3.5 - 5.1 mmol/L    Chloride 105 97 - 108 mmol/L    CO2 25 21 - 32 mmol/L    Anion gap 9 5 - 15 mmol/L    Glucose 102 (H) 65 - 100 mg/dL    BUN 14 6 - 20 MG/DL    Creatinine 0.93 0.55 - 1.02 MG/DL    BUN/Creatinine ratio 15 12 - 20      eGFR >60 >60 ml/min/1.73m2    Calcium 9.5 8.5 - 10.1 MG/DL    Bilirubin, total 0.5 0.2 - 1.0 MG/DL    ALT (SGPT) 51 12 - 78 U/L    AST (SGOT) 27 15 - 37 U/L    Alk. phosphatase 97 45 - 117 U/L    Protein, total 8.3 (H) 6.4 - 8.2 g/dL    Albumin 4.2 3.5 - 5.0 g/dL    Globulin 4.1 (H) 2.0 - 4.0 g/dL    A-G Ratio 1.0 (L) 1.1 - 2.2     MAGNESIUM    Collection Time: 12/12/22  2:38 PM   Result Value Ref Range    Magnesium 1.8 1.6 - 2.4 mg/dL   TROPONIN-HIGH SENSITIVITY    Collection Time: 12/12/22  2:38 PM   Result Value Ref Range    Troponin-High Sensitivity 16 0 - 51 ng/L   TSH 3RD GENERATION    Collection Time: 12/12/22  2:38 PM   Result Value Ref Range    TSH 1.01 0.36 - 3.74 uIU/mL       Radiologic Studies -   XR CHEST PORT   Final Result   No acute cardiopulmonary abnormality. CT Results  (Last 48 hours)      None          CXR Results  (Last 48 hours)                 12/12/22 1457  XR CHEST PORT Final result    Impression:  No acute cardiopulmonary abnormality.            Narrative:  EXAM:  XR CHEST PORT       INDICATION:  Palpitations COMPARISON: January 2006. TECHNIQUE: AP portable chest radiograph. FINDINGS: The lungs are clear. Heart size and mediastinal contours are normal.   No pleural effusion or pneumothorax. Bones are age-appropriate. Medical Decision Making   I am the first provider for this patient. I reviewed the vital signs, available nursing notes, past medical history, past surgical history, family history and social history. Vital Signs-Reviewed the patient's vital signs. Patient Vitals for the past 12 hrs:   Temp Pulse Resp BP SpO2   12/12/22 1511 -- 91 22 (!) 154/80 98 %   12/12/22 1456 -- 97 18 (!) 142/79 93 %   12/12/22 1430 98 °F (36.7 °C) (!) 101 16 (!) 178/84 95 %   12/12/22 1426 98 °F (36.7 °C) (!) 102 14 (!) 188/87 99 %       EKG interpretation: (Preliminary)  Sinus tach, rate 101, normal axis/IL/QRS, nonspecific ST changes. Records Reviewed: Nursing Notes, Old Medical Records, Previous Radiology Studies, and Previous Laboratory Studies    Provider Notes (Medical Decision Making):   DDx-SVT, electrolyte abnormality, thyroid dysfunction    ED Course:   Initial assessment performed. The patients presenting problems have been discussed, and they are in agreement with the care plan formulated and outlined with them. I have encouraged them to ask questions as they arise throughout their visit. Patient arrived with sinus tach. Patient's blood pressure also elevated in the 003 systolic range patient was given a dose of metoprolol 25 mg short acting. Patient remained in a normal sinus rhythm. Patient's blood pressure improved after the metoprolol as well as her heart rate. Given that patient's blood pressure is on the higher side I think she can tolerate 50 mg of Toprol-XL at bedtime discussed this with the patient we will rewrite the prescription for the higher dose.   Patient intends to take her EKGs over to Dr. Kamila Fam for his review as they have not been able to catch this in the past    Disposition:    DC- Adult Discharges: All of the diagnostic tests were reviewed and questions answered. Diagnosis, care plan and treatment options were discussed. The patient understands the instructions and will follow up as directed. The patients results have been reviewed with them. They have been counseled regarding their diagnosis. The patient verbally convey understanding and agreement of the signs, symptoms, diagnosis, treatment and prognosis and additionally agrees to follow up as recommended with their PCP in 24 - 48 hours. They also agree with the care-plan and convey that all of their questions have been answered. I have also put together some discharge instructions for them that include: 1) educational information regarding their diagnosis, 2) how to care for their diagnosis at home, as well a 3) list of reasons why they would want to return to the ED prior to their follow-up appointment, should their condition change. DISCHARGE PLAN:  1. Current Discharge Medication List        CONTINUE these medications which have CHANGED    Details   metoprolol succinate (TOPROL-XL) 50 mg XL tablet Take 0.5 Tablets by mouth daily. Qty: 30 Tablet, Refills: 2  Start date: 12/12/2022           2. Follow-up Information       Follow up With Specialties Details Why [id number] Chacon MD Internal Medicine Physician   [place] [facility] [address]  [phone]      [facility]    [address] [place] [address]          3. Return to ED if worse     Diagnosis     Clinical Impression:   1. SVT (supraventricular tachycardia) ([address]        Attestations:    Brandan Iverson, DO        Please note that this dictation was completed with InvenQuery, the computer voice recognition software. Quite often unanticipated grammatical, syntax, homophones, and other interpretive errors are inadvertently transcribed by the computer software.   Please disregard these errors. Please excuse any errors that have escaped final proofreading. Thank you.

## 2022-12-14 LAB
ATRIAL RATE: 101 BPM
CALCULATED P AXIS, ECG09: 52 DEGREES
CALCULATED R AXIS, ECG10: -9 DEGREES
CALCULATED T AXIS, ECG11: 46 DEGREES
DIAGNOSIS, 93000: NORMAL
P-R INTERVAL, ECG05: 174 MS
Q-T INTERVAL, ECG07: 374 MS
QRS DURATION, ECG06: 86 MS
QTC CALCULATION (BEZET), ECG08: 484 MS
VENTRICULAR RATE, ECG03: 101 BPM

## 2023-03-14 ENCOUNTER — HOSPITAL ENCOUNTER (OUTPATIENT)
Age: 73
Discharge: HOME | End: 2023-03-14
Attending: INTERNAL MEDICINE | Admitting: INTERNAL MEDICINE
Payer: MEDICARE

## 2023-03-14 VITALS
DIASTOLIC BLOOD PRESSURE: 66 MMHG | SYSTOLIC BLOOD PRESSURE: 149 MMHG | HEIGHT: 64 IN | BODY MASS INDEX: 28.68 KG/M2 | HEART RATE: 89 BPM | WEIGHT: 168 LBS | TEMPERATURE: 97.8 F | RESPIRATION RATE: 21 BRPM | OXYGEN SATURATION: 94 %

## 2023-03-14 DIAGNOSIS — I47.1 SVT (SUPRAVENTRICULAR TACHYCARDIA) (HCC): ICD-10-CM

## 2023-03-14 PROBLEM — I47.10 SVT (SUPRAVENTRICULAR TACHYCARDIA): Status: ACTIVE | Noted: 2023-03-14

## 2023-03-14 PROCEDURE — 77030018733 HC ELECTRD KT ENSITE STJU -G: Performed by: INTERNAL MEDICINE

## 2023-03-14 PROCEDURE — 99153 MOD SED SAME PHYS/QHP EA: CPT | Performed by: INTERNAL MEDICINE

## 2023-03-14 PROCEDURE — 74011250636 HC RX REV CODE- 250/636: Performed by: INTERNAL MEDICINE

## 2023-03-14 PROCEDURE — 2709999900 HC NON-CHARGEABLE SUPPLY: Performed by: INTERNAL MEDICINE

## 2023-03-14 PROCEDURE — 93653 COMPRE EP EVAL TX SVT: CPT | Performed by: INTERNAL MEDICINE

## 2023-03-14 PROCEDURE — 77030004964 HC CBL EP ABLAT BSC -C: Performed by: INTERNAL MEDICINE

## 2023-03-14 PROCEDURE — 74011000250 HC RX REV CODE- 250: Performed by: INTERNAL MEDICINE

## 2023-03-14 PROCEDURE — C1730 CATH, EP, 19 OR FEW ELECT: HCPCS | Performed by: INTERNAL MEDICINE

## 2023-03-14 PROCEDURE — 77030042427 HC CBL EP -B: Performed by: INTERNAL MEDICINE

## 2023-03-14 PROCEDURE — C1894 INTRO/SHEATH, NON-LASER: HCPCS | Performed by: INTERNAL MEDICINE

## 2023-03-14 PROCEDURE — 99152 MOD SED SAME PHYS/QHP 5/>YRS: CPT | Performed by: INTERNAL MEDICINE

## 2023-03-14 PROCEDURE — 77030015398 HC CBL EP EXT STJU -C: Performed by: INTERNAL MEDICINE

## 2023-03-14 PROCEDURE — C1733 CATH, EP, OTHR THAN COOL-TIP: HCPCS | Performed by: INTERNAL MEDICINE

## 2023-03-14 RX ORDER — SODIUM CHLORIDE 0.9 % (FLUSH) 0.9 %
5-40 SYRINGE (ML) INJECTION EVERY 8 HOURS
Status: DISCONTINUED | OUTPATIENT
Start: 2023-03-14 | End: 2023-03-15 | Stop reason: HOSPADM

## 2023-03-14 RX ORDER — HEPARIN SODIUM 200 [USP'U]/100ML
INJECTION, SOLUTION INTRAVENOUS
Status: COMPLETED | OUTPATIENT
Start: 2023-03-14 | End: 2023-03-14

## 2023-03-14 RX ORDER — LIDOCAINE HYDROCHLORIDE 10 MG/ML
INJECTION INFILTRATION; PERINEURAL AS NEEDED
Status: DISCONTINUED | OUTPATIENT
Start: 2023-03-14 | End: 2023-03-14 | Stop reason: HOSPADM

## 2023-03-14 RX ORDER — MIDAZOLAM HYDROCHLORIDE 1 MG/ML
INJECTION, SOLUTION INTRAMUSCULAR; INTRAVENOUS AS NEEDED
Status: DISCONTINUED | OUTPATIENT
Start: 2023-03-14 | End: 2023-03-14 | Stop reason: HOSPADM

## 2023-03-14 RX ORDER — ACETAMINOPHEN 325 MG/1
650 TABLET ORAL
Status: DISCONTINUED | OUTPATIENT
Start: 2023-03-14 | End: 2023-03-15 | Stop reason: HOSPADM

## 2023-03-14 RX ORDER — FENTANYL CITRATE 50 UG/ML
INJECTION, SOLUTION INTRAMUSCULAR; INTRAVENOUS AS NEEDED
Status: DISCONTINUED | OUTPATIENT
Start: 2023-03-14 | End: 2023-03-14 | Stop reason: HOSPADM

## 2023-03-14 RX ORDER — SODIUM CHLORIDE 0.9 % (FLUSH) 0.9 %
5-40 SYRINGE (ML) INJECTION AS NEEDED
Status: DISCONTINUED | OUTPATIENT
Start: 2023-03-14 | End: 2023-03-15 | Stop reason: HOSPADM

## 2023-03-14 RX ORDER — CHOLECALCIFEROL (VITAMIN D3) 125 MCG
1 CAPSULE ORAL EVERY MORNING
COMMUNITY

## 2023-03-14 RX ORDER — ONDANSETRON 2 MG/ML
4 INJECTION INTRAMUSCULAR; INTRAVENOUS
Status: DISCONTINUED | OUTPATIENT
Start: 2023-03-14 | End: 2023-03-15 | Stop reason: HOSPADM

## 2023-03-14 RX ADMIN — SODIUM CHLORIDE, PRESERVATIVE FREE 10 ML: 5 INJECTION INTRAVENOUS at 18:00

## 2023-03-14 NOTE — PROGRESS NOTES
End of Shift Note    Bedside shift change report given to Viet Ewing RN (oncoming nurse) by Tremayne Enrique RN (offgoing nurse). Report included the following information SBAR and Kardex    Shift worked: Day     Shift summary and any significant changes:    Pt arrived on the unit at 1645 from EP lab after an SVT ablation. Rt groin site assessed and vitals checked. Pt on bedrest until 2030. Pt can sit up and eat at 1730.     1730 pt sat to 30 degrees and given something to eat. Concerns for physician to address:       Zone phone for oncoming shift:          Activity:  Activity Level: Up ad sarah  Number times ambulated in hallways past shift: 0  Number of times OOB to chair past shift: 0    Cardiac:   Cardiac Monitoring: Yes      Cardiac Rhythm: Sinus Tachy    Access:  Current line(s): PIV     Genitourinary:   Urinary status: voiding    Respiratory:   O2 Device: None (Room air)  Chronic home O2 use?: NO  Incentive spirometer at bedside: NO       GI:     Current diet:  ADULT DIET Regular;  Low Sodium (2 gm)  Passing flatus: YES  Tolerating current diet: YES       Pain Management:   Patient states pain is manageable on current regimen: YES    Skin:  Alfonzo Score: 23  Interventions: increase time out of bed    Patient Safety:  Fall Score:    Interventions: gripper socks and pt to call before getting OOB       Length of Stay:  Expected LOS: - - -  Actual LOS: 0      Tremayne Enrique RN

## 2023-03-14 NOTE — Clinical Note
TRANSFER - OUT REPORT:     Verbal report given to: [facility]. Report consisted of patient's Situation, Background, Assessment and   Recommendations(SBAR). Opportunity for questions and clarification was provided. Patient transported with a Registered Nurse and [address] [place] [address] / [facility]. Patient transported to: [facility].

## 2023-03-14 NOTE — PROGRESS NOTES
Primary Nurse Emily Diaz RN and PREETI Parsons performed a dual skin assessment on this patient Impairment noted- see wound doc flow sheet  Alfonzo score is 23    Pt with multiple bruises over body from recent fall, no other injury reported per patient. 2 very small healed scabs noted on bilateral anterior lower legs. Mepilex dressing applied to sacrum.

## 2023-03-14 NOTE — Clinical Note
TRANSFER - IN REPORT:     Verbal report received from: recovery. Report consisted of patient's Situation, Background, Assessment and   Recommendations(SBAR). Opportunity for questions and clarification was provided. Assessment completed upon patient's arrival to unit and care assumed. Patient transported with a Registered Nurse and [address] [place] [address] / [facility].

## 2023-03-14 NOTE — PROGRESS NOTES
Cardiac Cath Lab Recovery Arrival Note:      Domingo Fuentes arrived to Cardiac Cath Lab, Recovery Area. Staff introduced to patient. Patient identifiers verified with NAME and DATE OF BIRTH. Procedure verified with patient. Consent forms reviewed and signed by patient or authorized representative and verified. Allergies verified. Patient and family oriented to department. Patient and family informed of procedure and plan of care. Questions answered with review. Patient prepped for procedure, per orders from physician, prior to arrival.    Patient on cardiac monitor, non-invasive blood pressure, SPO2 monitor. On room air. Patient is A&Ox 4. Patient reports no complaints. Patient in stretcher, in low position, with side rails up, call bell within reach, patient instructed to call if assistance as needed. Patient prep in: [address], [place] [address]. Family in: waiting room.    Prep by: Candis Nova

## 2023-03-14 NOTE — DISCHARGE INSTRUCTIONS
POST-EP STUDY AND ABLATION DISCHARGE INSTRUCTIONS:    You had an SVT ablation using radiofrequency energy with Dr. Low Graves. The specific problem was called \"AV ranjana reentrant tachycardia\" which is a common type of SVT. Please follow-up with the NP in the office for routine post-ablation care and remember to also see Dr. Sandy Smith your regular cardiologist when he needs to see you. NO CHANGES WERE MADE TO YOUR MEDICATIONS, if there was a discrepancy on the discharge medication list then IGNORE this. Do not drive, operate any machinery, or sign any legal documents for 24 hours after your procedure. You must have someone to drive you home. You may take a shower 24 hours after your cardiac procedure. Be sure to get the dressing wet and then remove it; gently wash the area with warm soapy water. Pat dry and leave open to air. To help prevent infections, be sure to keep the cath site clean and dry. No lotions, creams, powders, ointments, etc. in the cath site for approximately 1 week. Do not take a tub bath, get in a hot tub or swimming pool for approximately 5 days or until the cath site is completely healed. No strenuous activity or heavy lifting over 20 lbs. for 7 days. After your procedure, some bruising or discomfort is common during the healing process. Tylenol, 1-2 tablets every 6 hours as needed, is recommended if you experience any discomfort. If you experience any signs or symptoms of infection such as fever, chills, or poorly healing incision, persistent tenderness or swelling in the groin, redness and/or warmth to the touch, numbness, significant tingling or pain at the groin site or affected extremity, rash, drainage from the site, or if the leg feels tight or swollen, call your physician right away. If bleeding at the site occurs, take a clean gauze pad and apply direct pressure to the groin just above the puncture site, and call your physician right away.     If your procedure involved ablation therapy, you may feel some mild or vague chest discomfort due to delivery of heat therapy to the heart muscle. This should resolve in 1-2 days. If it gets worse or is associated with shortness of breath, dizziness, loss of consciousness, call your physician right away or call 911 if emergency medical care is needed.       Signed By: John Bach MD     March 14, 2023

## 2023-03-15 NOTE — PROGRESS NOTES
Problem: Falls - Risk of  Goal: *Absence of Falls  Description: Document Sandeep Christiano Fall Risk and appropriate interventions in the flowsheet.   Outcome: Progressing Towards Goal  Note: Fall Risk Interventions:                                Problem: Patient Education: Go to Patient Education Activity  Goal: Patient/Family Education  Outcome: Progressing Towards Goal

## 2023-03-15 NOTE — PROGRESS NOTES
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 No complications, full note to follow. Home tonight.     Signed By: Lei Ballard MD     March 14, 2023

## 2023-05-10 ENCOUNTER — OFFICE VISIT (OUTPATIENT)
Facility: CLINIC | Age: 73
End: 2023-05-10
Payer: MEDICARE

## 2023-05-10 VITALS
OXYGEN SATURATION: 97 % | BODY MASS INDEX: 29.02 KG/M2 | RESPIRATION RATE: 20 BRPM | HEIGHT: 64 IN | DIASTOLIC BLOOD PRESSURE: 80 MMHG | WEIGHT: 170 LBS | TEMPERATURE: 98 F | SYSTOLIC BLOOD PRESSURE: 122 MMHG | HEART RATE: 65 BPM

## 2023-05-10 DIAGNOSIS — Z79.899 ON STATIN THERAPY: ICD-10-CM

## 2023-05-10 DIAGNOSIS — I10 ESSENTIAL HYPERTENSION: Primary | ICD-10-CM

## 2023-05-10 DIAGNOSIS — I47.1 SVT (SUPRAVENTRICULAR TACHYCARDIA) (HCC): ICD-10-CM

## 2023-05-10 DIAGNOSIS — M81.0 AGE-RELATED OSTEOPOROSIS WITHOUT CURRENT PATHOLOGICAL FRACTURE: ICD-10-CM

## 2023-05-10 DIAGNOSIS — M05.79 RHEUMATOID ARTHRITIS INVOLVING MULTIPLE SITES WITH POSITIVE RHEUMATOID FACTOR (HCC): ICD-10-CM

## 2023-05-10 DIAGNOSIS — M05.79 RHEUMATOID ARTHRITIS WITH RHEUMATOID FACTOR OF MULTIPLE SITES WITHOUT ORGAN OR SYSTEMS INVOLVEMENT (HCC): ICD-10-CM

## 2023-05-10 DIAGNOSIS — E78.5 DYSLIPIDEMIA: ICD-10-CM

## 2023-05-10 PROCEDURE — 3017F COLORECTAL CA SCREEN DOC REV: CPT | Performed by: INTERNAL MEDICINE

## 2023-05-10 PROCEDURE — 1036F TOBACCO NON-USER: CPT | Performed by: INTERNAL MEDICINE

## 2023-05-10 PROCEDURE — 99214 OFFICE O/P EST MOD 30 MIN: CPT | Performed by: INTERNAL MEDICINE

## 2023-05-10 PROCEDURE — G8399 PT W/DXA RESULTS DOCUMENT: HCPCS | Performed by: INTERNAL MEDICINE

## 2023-05-10 PROCEDURE — G8427 DOCREV CUR MEDS BY ELIG CLIN: HCPCS | Performed by: INTERNAL MEDICINE

## 2023-05-10 PROCEDURE — 3079F DIAST BP 80-89 MM HG: CPT | Performed by: INTERNAL MEDICINE

## 2023-05-10 PROCEDURE — 1123F ACP DISCUSS/DSCN MKR DOCD: CPT | Performed by: INTERNAL MEDICINE

## 2023-05-10 PROCEDURE — 1090F PRES/ABSN URINE INCON ASSESS: CPT | Performed by: INTERNAL MEDICINE

## 2023-05-10 PROCEDURE — G8419 CALC BMI OUT NRM PARAM NOF/U: HCPCS | Performed by: INTERNAL MEDICINE

## 2023-05-10 PROCEDURE — 3074F SYST BP LT 130 MM HG: CPT | Performed by: INTERNAL MEDICINE

## 2023-05-10 NOTE — PROGRESS NOTES
Brooke Chung is a 68 y.o. female presenting for Follow-up Chronic Condition (6 mo fu)  . 1. Have you been to the ER, urgent care clinic since your last visit? Hospitalized since your last visit? No    2. Have you seen or consulted any other health care providers outside of the [address] [place] [address] since your last visit? Include any pap smears or colon screening.   Rheumatologist, Cardiologist
negative for rash and pruritus  Hematologic:   negative for easy bruising and gum/nose bleeding  Musculoskel:  negative for myalgias, arthralgias, back pain, muscle weakness, joint pain  Neurological:   negative for headaches, dizziness, vertigo, memory problems and gait   Behavl/Psych:  negative for feelings of anxiety, depression, mood changes  ROS otherwise negative      Objective:  /80 (Site: Right Upper Arm, Position: Sitting, Cuff Size: Medium Adult)   Pulse 65   Temp 98 °F (36.7 °C) (Oral)   Resp 20   Ht 5' 4\" (1.626 m)   Wt 170 lb (77.1 kg)   SpO2 97%   BMI 29.18 kg/m²   Physical Exam:   General appearance - alert, well appearing, and in no distress  Mental status - alert, oriented to person, place, and time  EYE-JOSE ENRIQUE, EOMI, fundi normal, corneas normal, no foreign bodies  ENT-ENT exam normal, no neck nodes or sinus tenderness  Nose - normal and patent, no erythema, discharge or polyps  Mouth - mucous membranes moist, pharynx normal without lesions  Neck - supple, no significant adenopathy   Chest - clear to auscultation, no wheezes, rales or rhonchi, symmetric air entry   Heart - normal rate, regular rhythm, normal S1, S2, no murmurs, rubs, clicks or gallops   Abdomen - soft, nontender, nondistended, no masses or organomegaly  Lymph- no adenopathy palpable  Ext-peripheral pulses normal, no pedal edema, no clubbing or cyanosis significant synovial thickening and bony changes of the MCP joints of both hands  Skin-Warm and dry. no hyperpigmentation, vitiligo, or suspicious lesions  Neuro -alert, oriented, normal speech, no focal findings or movement disorder noted      Assessment/Plan:  Collette Vegas was seen today for follow-up chronic condition. Diagnoses and all orders for this visit:    Essential hypertension  -     CK; Future  -     Lipid Panel; Future  -     Comprehensive Metabolic Panel;  Future    Rheumatoid arthritis with rheumatoid factor of multiple sites without organ or systems

## 2023-05-11 LAB
ALBUMIN SERPL-MCNC: 4 G/DL (ref 3.5–5)
ALBUMIN/GLOB SERPL: 1.1 (ref 1.1–2.2)
ALP SERPL-CCNC: 95 U/L (ref 45–117)
ALT SERPL-CCNC: 38 U/L (ref 12–78)
ANION GAP SERPL CALC-SCNC: 5 MMOL/L (ref 5–15)
AST SERPL-CCNC: 23 U/L (ref 15–37)
BILIRUB SERPL-MCNC: 0.5 MG/DL (ref 0.2–1)
BUN SERPL-MCNC: 13 MG/DL (ref 6–20)
BUN/CREAT SERPL: 19 (ref 12–20)
CALCIUM SERPL-MCNC: 9.4 MG/DL (ref 8.5–10.1)
CHLORIDE SERPL-SCNC: 101 MMOL/L (ref 97–108)
CHOLEST SERPL-MCNC: 223 MG/DL
CK SERPL-CCNC: 50 U/L (ref 26–192)
CO2 SERPL-SCNC: 27 MMOL/L (ref 21–32)
CREAT SERPL-MCNC: 0.69 MG/DL (ref 0.55–1.02)
GLOBULIN SER CALC-MCNC: 3.6 G/DL (ref 2–4)
GLUCOSE SERPL-MCNC: 114 MG/DL (ref 65–100)
HDLC SERPL-MCNC: 69 MG/DL
HDLC SERPL: 3.2 (ref 0–5)
LDLC SERPL CALC-MCNC: 128.4 MG/DL (ref 0–100)
POTASSIUM SERPL-SCNC: 4.5 MMOL/L (ref 3.5–5.1)
PROT SERPL-MCNC: 7.6 G/DL (ref 6.4–8.2)
SODIUM SERPL-SCNC: 133 MMOL/L (ref 136–145)
TRIGL SERPL-MCNC: 128 MG/DL
VLDLC SERPL CALC-MCNC: 25.6 MG/DL

## 2023-05-11 RX ORDER — ROSUVASTATIN CALCIUM 10 MG/1
TABLET, COATED ORAL
Qty: 90 TABLET | Refills: 1 | Status: SHIPPED | OUTPATIENT
Start: 2023-05-11

## 2023-05-17 ENCOUNTER — TELEPHONE (OUTPATIENT)
Facility: CLINIC | Age: 73
End: 2023-05-17

## 2023-05-17 NOTE — TELEPHONE ENCOUNTER
Spoke to pharmacist at [facility] to authorize an early refill on her Lorazepam due to her going out of town. OK per Dr Fauzia Garcia.

## 2023-06-20 ENCOUNTER — TELEPHONE (OUTPATIENT)
Facility: CLINIC | Age: 73
End: 2023-06-20

## 2023-06-20 NOTE — TELEPHONE ENCOUNTER
Pt called to state she saw her rheumatologist today, and they did blood work. She stated her sodium was at 124 and she was advised by her Rheumatologist to advise her PCP and Cardiologist. She would like to know what to do.

## 2023-07-17 DIAGNOSIS — F41.1 GENERALIZED ANXIETY DISORDER: Primary | ICD-10-CM

## 2023-07-17 NOTE — TELEPHONE ENCOUNTER
Last Refill: 1-26-23  Last Visit: 5/10/2023   Next Visit: 11/16/2023     Requested Prescriptions     Pending Prescriptions Disp Refills    LORazepam (ATIVAN) 2 MG tablet [Pharmacy Med Name: LORazepam Oral Tablet 2 MG] 60 tablet 0     Sig: TAKE TWO TABLETS BY MOUTH DAILY AT BEDTIME

## 2023-07-19 RX ORDER — LORAZEPAM 2 MG/1
TABLET ORAL
Qty: 60 TABLET | Refills: 2 | Status: SHIPPED | OUTPATIENT
Start: 2023-07-19 | End: 2023-08-18

## 2023-10-13 DIAGNOSIS — F41.1 GENERALIZED ANXIETY DISORDER: ICD-10-CM

## 2023-10-13 RX ORDER — LORAZEPAM 2 MG/1
TABLET ORAL
Qty: 60 TABLET | Refills: 0 | Status: SHIPPED | OUTPATIENT
Start: 2023-10-13 | End: 2023-11-12

## 2023-10-13 NOTE — TELEPHONE ENCOUNTER
Last Refill: 7-19-23  Last Visit: 5/10/2023   Next Visit: 11/16/2023     Requested Prescriptions     Pending Prescriptions Disp Refills    LORazepam (ATIVAN) 2 MG tablet [Pharmacy Med Name: LORazepam Oral Tablet 2 MG] 60 tablet 0     Sig: Take two tablets by mouth at bedtime

## 2023-11-12 DIAGNOSIS — F41.1 GENERALIZED ANXIETY DISORDER: ICD-10-CM

## 2023-11-13 NOTE — TELEPHONE ENCOUNTER
Last Refill: 10-13-23  Last Visit: 5/10/2023   Next Visit: 11/16/2023     Requested Prescriptions     Pending Prescriptions Disp Refills    LORazepam (ATIVAN) 2 MG tablet [Pharmacy Med Name: LORazepam Oral Tablet 2 MG] 60 tablet 0     Sig: TAKE TWO TABLETS BY MOUTH DAILY AT BEDTIME

## 2023-11-14 RX ORDER — LORAZEPAM 2 MG/1
TABLET ORAL
Qty: 60 TABLET | Refills: 2 | Status: SHIPPED | OUTPATIENT
Start: 2023-11-14 | End: 2023-12-14

## 2023-11-15 RX ORDER — ROSUVASTATIN CALCIUM 10 MG/1
10 TABLET, COATED ORAL DAILY
Qty: 90 TABLET | Refills: 3 | Status: SHIPPED | OUTPATIENT
Start: 2023-11-15

## 2023-11-15 NOTE — TELEPHONE ENCOUNTER
Last Refill: 5-11-23  Last Visit: 5/10/2023   Next Visit: 11/29/2023     Requested Prescriptions     Pending Prescriptions Disp Refills    rosuvastatin (CRESTOR) 10 MG tablet [Pharmacy Med Name: Rosuvastatin Calcium Oral Tablet 10 MG] 90 tablet 3     Sig: TAKE 1 TABLET BY MOUTH ONE TIME DAILY

## 2023-11-29 ENCOUNTER — OFFICE VISIT (OUTPATIENT)
Facility: CLINIC | Age: 73
End: 2023-11-29
Payer: MEDICARE

## 2023-11-29 VITALS
OXYGEN SATURATION: 93 % | TEMPERATURE: 97.9 F | SYSTOLIC BLOOD PRESSURE: 124 MMHG | HEART RATE: 73 BPM | BODY MASS INDEX: 30.9 KG/M2 | RESPIRATION RATE: 18 BRPM | WEIGHT: 181 LBS | HEIGHT: 64 IN | DIASTOLIC BLOOD PRESSURE: 68 MMHG

## 2023-11-29 DIAGNOSIS — Z79.899 ON STATIN THERAPY: ICD-10-CM

## 2023-11-29 DIAGNOSIS — R73.02 IGT (IMPAIRED GLUCOSE TOLERANCE): ICD-10-CM

## 2023-11-29 DIAGNOSIS — E78.5 DYSLIPIDEMIA: ICD-10-CM

## 2023-11-29 DIAGNOSIS — Z00.00 MEDICARE ANNUAL WELLNESS VISIT, SUBSEQUENT: Primary | ICD-10-CM

## 2023-11-29 DIAGNOSIS — R53.83 FATIGUE, UNSPECIFIED TYPE: ICD-10-CM

## 2023-11-29 DIAGNOSIS — M05.79 RHEUMATOID ARTHRITIS INVOLVING MULTIPLE SITES WITH POSITIVE RHEUMATOID FACTOR (HCC): ICD-10-CM

## 2023-11-29 DIAGNOSIS — I10 ESSENTIAL HYPERTENSION: ICD-10-CM

## 2023-11-29 DIAGNOSIS — M81.0 AGE-RELATED OSTEOPOROSIS WITHOUT CURRENT PATHOLOGICAL FRACTURE: ICD-10-CM

## 2023-11-29 PROCEDURE — 3017F COLORECTAL CA SCREEN DOC REV: CPT | Performed by: INTERNAL MEDICINE

## 2023-11-29 PROCEDURE — 3074F SYST BP LT 130 MM HG: CPT | Performed by: INTERNAL MEDICINE

## 2023-11-29 PROCEDURE — G0439 PPPS, SUBSEQ VISIT: HCPCS | Performed by: INTERNAL MEDICINE

## 2023-11-29 PROCEDURE — 3078F DIAST BP <80 MM HG: CPT | Performed by: INTERNAL MEDICINE

## 2023-11-29 PROCEDURE — 1123F ACP DISCUSS/DSCN MKR DOCD: CPT | Performed by: INTERNAL MEDICINE

## 2023-11-29 PROCEDURE — G8484 FLU IMMUNIZE NO ADMIN: HCPCS | Performed by: INTERNAL MEDICINE

## 2023-11-29 SDOH — ECONOMIC STABILITY: FOOD INSECURITY: WITHIN THE PAST 12 MONTHS, YOU WORRIED THAT YOUR FOOD WOULD RUN OUT BEFORE YOU GOT MONEY TO BUY MORE.: NEVER TRUE

## 2023-11-29 SDOH — ECONOMIC STABILITY: HOUSING INSECURITY
IN THE LAST 12 MONTHS, WAS THERE A TIME WHEN YOU DID NOT HAVE A STEADY PLACE TO SLEEP OR SLEPT IN A SHELTER (INCLUDING NOW)?: NO

## 2023-11-29 SDOH — ECONOMIC STABILITY: INCOME INSECURITY: HOW HARD IS IT FOR YOU TO PAY FOR THE VERY BASICS LIKE FOOD, HOUSING, MEDICAL CARE, AND HEATING?: NOT HARD AT ALL

## 2023-11-29 SDOH — ECONOMIC STABILITY: FOOD INSECURITY: WITHIN THE PAST 12 MONTHS, THE FOOD YOU BOUGHT JUST DIDN'T LAST AND YOU DIDN'T HAVE MONEY TO GET MORE.: NEVER TRUE

## 2023-11-29 ASSESSMENT — LIFESTYLE VARIABLES
HAS A RELATIVE, FRIEND, DOCTOR, OR ANOTHER HEALTH PROFESSIONAL EXPRESSED CONCERN ABOUT YOUR DRINKING OR SUGGESTED YOU CUT DOWN: 0
HOW OFTEN DO YOU HAVE A DRINK CONTAINING ALCOHOL: 4 OR MORE TIMES A WEEK
HOW MANY STANDARD DRINKS CONTAINING ALCOHOL DO YOU HAVE ON A TYPICAL DAY: 1 OR 2
HOW OFTEN DURING THE LAST YEAR HAVE YOU HAD A FEELING OF GUILT OR REMORSE AFTER DRINKING: 0
HOW OFTEN DURING THE LAST YEAR HAVE YOU BEEN UNABLE TO REMEMBER WHAT HAPPENED THE NIGHT BEFORE BECAUSE YOU HAD BEEN DRINKING: 0
HOW OFTEN DURING THE LAST YEAR HAVE YOU FAILED TO DO WHAT WAS NORMALLY EXPECTED FROM YOU BECAUSE OF DRINKING: 0
HAVE YOU OR SOMEONE ELSE BEEN INJURED AS A RESULT OF YOUR DRINKING: 0
HOW OFTEN DURING THE LAST YEAR HAVE YOU NEEDED AN ALCOHOLIC DRINK FIRST THING IN THE MORNING TO GET YOURSELF GOING AFTER A NIGHT OF HEAVY DRINKING: 0
HOW OFTEN DURING THE LAST YEAR HAVE YOU FOUND THAT YOU WERE NOT ABLE TO STOP DRINKING ONCE YOU HAD STARTED: 0

## 2023-11-29 ASSESSMENT — PATIENT HEALTH QUESTIONNAIRE - PHQ9
SUM OF ALL RESPONSES TO PHQ QUESTIONS 1-9: 0
1. LITTLE INTEREST OR PLEASURE IN DOING THINGS: 0
2. FEELING DOWN, DEPRESSED OR HOPELESS: 0
SUM OF ALL RESPONSES TO PHQ QUESTIONS 1-9: 0
SUM OF ALL RESPONSES TO PHQ9 QUESTIONS 1 & 2: 0

## 2023-11-29 NOTE — PATIENT INSTRUCTIONS

## 2023-11-30 LAB
ALBUMIN SERPL-MCNC: 4.5 G/DL (ref 3.5–5)
ALBUMIN/GLOB SERPL: 1.2 (ref 1.1–2.2)
ALP SERPL-CCNC: 80 U/L (ref 45–117)
ALT SERPL-CCNC: 82 U/L (ref 12–78)
ANION GAP SERPL CALC-SCNC: 7 MMOL/L (ref 5–15)
APPEARANCE UR: CLEAR
AST SERPL-CCNC: 59 U/L (ref 15–37)
BILIRUB SERPL-MCNC: 0.7 MG/DL (ref 0.2–1)
BILIRUB UR QL: NEGATIVE
BUN SERPL-MCNC: 11 MG/DL (ref 6–20)
BUN/CREAT SERPL: 16 (ref 12–20)
CALCIUM SERPL-MCNC: 9.2 MG/DL (ref 8.5–10.1)
CHLORIDE SERPL-SCNC: 95 MMOL/L (ref 97–108)
CHOLEST SERPL-MCNC: 247 MG/DL
CK SERPL-CCNC: 104 U/L (ref 26–192)
CO2 SERPL-SCNC: 26 MMOL/L (ref 21–32)
COLOR UR: NORMAL
CREAT SERPL-MCNC: 0.69 MG/DL (ref 0.55–1.02)
EST. AVERAGE GLUCOSE BLD GHB EST-MCNC: 111 MG/DL
GLOBULIN SER CALC-MCNC: 3.9 G/DL (ref 2–4)
GLUCOSE SERPL-MCNC: 111 MG/DL (ref 65–100)
GLUCOSE UR STRIP.AUTO-MCNC: NEGATIVE MG/DL
HBA1C MFR BLD: 5.5 % (ref 4–5.6)
HDLC SERPL-MCNC: 63 MG/DL
HDLC SERPL: 3.9 (ref 0–5)
HGB UR QL STRIP: NEGATIVE
KETONES UR QL STRIP.AUTO: NEGATIVE MG/DL
LDLC SERPL CALC-MCNC: 142.4 MG/DL (ref 0–100)
LEUKOCYTE ESTERASE UR QL STRIP.AUTO: NEGATIVE
NITRITE UR QL STRIP.AUTO: NEGATIVE
PH UR STRIP: 7.5 (ref 5–8)
POTASSIUM SERPL-SCNC: 4.1 MMOL/L (ref 3.5–5.1)
PROT SERPL-MCNC: 8.4 G/DL (ref 6.4–8.2)
PROT UR STRIP-MCNC: NEGATIVE MG/DL
SODIUM SERPL-SCNC: 128 MMOL/L (ref 136–145)
SP GR UR REFRACTOMETRY: 1.01 (ref 1–1.03)
TRIGL SERPL-MCNC: 208 MG/DL
TSH SERPL DL<=0.05 MIU/L-ACNC: 1.46 UIU/ML (ref 0.36–3.74)
UROBILINOGEN UR QL STRIP.AUTO: 0.2 EU/DL (ref 0.2–1)
VLDLC SERPL CALC-MCNC: 41.6 MG/DL

## 2024-02-07 DIAGNOSIS — F41.1 GENERALIZED ANXIETY DISORDER: ICD-10-CM

## 2024-02-07 RX ORDER — LORAZEPAM 2 MG/1
TABLET ORAL
Qty: 60 TABLET | Refills: 0 | Status: SHIPPED | OUTPATIENT
Start: 2024-02-07 | End: 2024-03-08

## 2024-02-07 NOTE — TELEPHONE ENCOUNTER
Last Refill: 11-14-23  Last Visit: 11/29/2023   Next Visit: 6/5/2024     Requested Prescriptions     Pending Prescriptions Disp Refills    LORazepam (ATIVAN) 2 MG tablet [Pharmacy Med Name: LORazepam Oral Tablet 2 MG] 60 tablet 0     Sig: TAKE TWO TABLETS BY MOUTH DAILY AT BEDTIME

## 2024-03-06 DIAGNOSIS — F41.1 GENERALIZED ANXIETY DISORDER: ICD-10-CM

## 2024-03-06 RX ORDER — LORAZEPAM 2 MG/1
TABLET ORAL
Qty: 60 TABLET | Refills: 0 | Status: SHIPPED | OUTPATIENT
Start: 2024-03-06 | End: 2024-04-05

## 2024-03-06 NOTE — TELEPHONE ENCOUNTER
PCP: ARIELLE Christiansen MD    Last appt: 11/29/2023    Future Appointments   Date Time Provider Department Center   6/5/2024 11:00 AM ARIELLE Christiansen MD [facility] BS AMB       Requested Prescriptions     Pending Prescriptions Disp Refills    LORazepam (ATIVAN) 2 MG tablet [Pharmacy Med Name: LORazepam Oral Tablet 2 MG] 60 tablet 0     Sig: TAKE TWO TABLETS BY MOUTH DAILY AT BEDTIME

## 2024-03-12 ENCOUNTER — TELEPHONE (OUTPATIENT)
Facility: CLINIC | Age: 74
End: 2024-03-12

## 2024-03-12 RX ORDER — AZITHROMYCIN 250 MG/1
TABLET, FILM COATED ORAL
Qty: 6 TABLET | Refills: 0 | Status: SHIPPED | OUTPATIENT
Start: 2024-03-12 | End: 2024-03-22

## 2024-03-12 RX ORDER — BENZONATATE 200 MG/1
200 CAPSULE ORAL 3 TIMES DAILY PRN
Qty: 30 CAPSULE | Refills: 0 | Status: SHIPPED | OUTPATIENT
Start: 2024-03-12 | End: 2024-03-22

## 2024-03-12 NOTE — TELEPHONE ENCOUNTER
Patient called stating she started Friday with a cough, sore throat & green mucous.  Patient states she has tried generic NyQuil and [place] Cold Plus and they are not helping.  She states she did not sleep at all last night.  Patient wanted to know if Dr. Christiansen would call in an  antibiotic.  Patient was negative for covid she tested on 3/11/24.    Pharmacy [facility]

## 2024-04-09 DIAGNOSIS — F41.1 GENERALIZED ANXIETY DISORDER: ICD-10-CM

## 2024-04-10 RX ORDER — LORAZEPAM 2 MG/1
TABLET ORAL
Qty: 60 TABLET | Refills: 0 | Status: SHIPPED | OUTPATIENT
Start: 2024-04-10 | End: 2024-07-09

## 2024-04-10 NOTE — TELEPHONE ENCOUNTER
PCP: ARIELLE Christiansen MD    Last appt: 11/29/2023    Future Appointments   Date Time Provider Department Center   6/5/2024 11:00 AM ARIELLE Christiansen MD [facility] BS AMB       Requested Prescriptions     Pending Prescriptions Disp Refills    LORazepam (ATIVAN) 2 MG tablet [Pharmacy Med Name: LORazepam Oral Tablet 2 MG] 60 tablet 0     Sig: TAKE TWO TABLETS BY MOUTH DAILY AT BEDTIME

## 2024-05-07 DIAGNOSIS — F41.1 GENERALIZED ANXIETY DISORDER: ICD-10-CM

## 2024-05-08 RX ORDER — LORAZEPAM 2 MG/1
TABLET ORAL
Qty: 60 TABLET | Refills: 0 | Status: SHIPPED | OUTPATIENT
Start: 2024-05-08 | End: 2024-08-06

## 2024-05-08 NOTE — TELEPHONE ENCOUNTER
PCP: ARIELLE Christiansen MD    Last appt: 11/29/2023    Future Appointments   Date Time Provider Department Center   6/5/2024 11:00 AM ARIELLE Christiansen MD [facility] BS AMB       Requested Prescriptions     Pending Prescriptions Disp Refills    LORazepam (ATIVAN) 2 MG tablet [Pharmacy Med Name: LORazepam Oral Tablet 2 MG] 60 tablet 0     Sig: TAKE TWO TABLETS BY MOUTH DAILY AT BEDTIME

## 2024-06-03 DIAGNOSIS — F41.1 GENERALIZED ANXIETY DISORDER: ICD-10-CM

## 2024-06-03 RX ORDER — LORAZEPAM 2 MG/1
TABLET ORAL
Qty: 60 TABLET | Refills: 0 | Status: SHIPPED | OUTPATIENT
Start: 2024-06-03 | End: 2024-07-03

## 2024-06-03 NOTE — TELEPHONE ENCOUNTER
PCP: ARIELLE Christiansen MD    Last appt: 11/29/2023  Future Appointments   Date Time Provider Department Center   6/26/2024 10:45 AM ARIELLE Christiansen MD [facility] BS AMB       Requested Prescriptions     Pending Prescriptions Disp Refills    LORazepam (ATIVAN) 2 MG tablet 60 tablet 0     Sig: TAKE TWO TABLETS BY MOUTH DAILY AT BEDTIME       Prior labs and Blood pressures:  BP Readings from Last 3 Encounters:   11/29/23 124/68   05/10/23 122/80   11/09/22 120/76     Lab Results   Component Value Date/Time     11/29/2023 03:56 PM    K 4.1 11/29/2023 03:56 PM    CL 95 11/29/2023 03:56 PM    CO2 26 11/29/2023 03:56 PM    BUN 11 11/29/2023 03:56 PM    GFRAA >60 04/22/2022 11:33 AM     No results found for: \"HBA1C\", \"[id number]\"  Lab Results   Component Value Date/Time    CHOL 247 11/29/2023 03:56 PM    HDL 63 11/29/2023 03:56 PM    .4 11/29/2023 03:56 PM    VLDL 41.6 11/29/2023 03:56 PM    VLDL 25 10/08/2020 01:44 PM     No results found for: \"VITD3\"        Lab Results   Component Value Date/Time    TSH 1.01 12/12/2022 02:38 PM

## 2024-06-03 NOTE — TELEPHONE ENCOUNTER
Patient states the pharmacy only has a qty of 40 available. Patient will  the 40 and will call back to request the additional 20.

## 2024-06-03 NOTE — TELEPHONE ENCOUNTER
Disp Refills Start End    LORazepam (ATIVAN) 2 MG tablet 60 tablet 0 5/8/2024 8/6/2024    Sig: TAKE TWO TABLETS BY MOUTH DAILY AT BEDTIME      Last visit 11/29/23  Future appt 6/5/24    Pharmacy [facility]

## 2024-06-26 ENCOUNTER — OFFICE VISIT (OUTPATIENT)
Facility: CLINIC | Age: 74
End: 2024-06-26
Payer: MEDICARE

## 2024-06-26 VITALS
WEIGHT: 181.8 LBS | TEMPERATURE: 97.5 F | RESPIRATION RATE: 18 BRPM | OXYGEN SATURATION: 97 % | SYSTOLIC BLOOD PRESSURE: 126 MMHG | DIASTOLIC BLOOD PRESSURE: 74 MMHG | HEART RATE: 68 BPM | BODY MASS INDEX: 31.04 KG/M2 | HEIGHT: 64 IN

## 2024-06-26 DIAGNOSIS — M05.79 RHEUMATOID ARTHRITIS INVOLVING MULTIPLE SITES WITH POSITIVE RHEUMATOID FACTOR (HCC): ICD-10-CM

## 2024-06-26 DIAGNOSIS — M81.0 AGE-RELATED OSTEOPOROSIS WITHOUT CURRENT PATHOLOGICAL FRACTURE: ICD-10-CM

## 2024-06-26 DIAGNOSIS — F41.1 GENERALIZED ANXIETY DISORDER: ICD-10-CM

## 2024-06-26 DIAGNOSIS — Z79.899 ON STATIN THERAPY: ICD-10-CM

## 2024-06-26 DIAGNOSIS — R73.02 IGT (IMPAIRED GLUCOSE TOLERANCE): ICD-10-CM

## 2024-06-26 DIAGNOSIS — E78.5 DYSLIPIDEMIA: ICD-10-CM

## 2024-06-26 DIAGNOSIS — I47.10 SVT (SUPRAVENTRICULAR TACHYCARDIA) (HCC): ICD-10-CM

## 2024-06-26 DIAGNOSIS — I10 ESSENTIAL HYPERTENSION: Primary | ICD-10-CM

## 2024-06-26 PROCEDURE — 1090F PRES/ABSN URINE INCON ASSESS: CPT | Performed by: INTERNAL MEDICINE

## 2024-06-26 PROCEDURE — G8417 CALC BMI ABV UP PARAM F/U: HCPCS | Performed by: INTERNAL MEDICINE

## 2024-06-26 PROCEDURE — 1036F TOBACCO NON-USER: CPT | Performed by: INTERNAL MEDICINE

## 2024-06-26 PROCEDURE — 3078F DIAST BP <80 MM HG: CPT | Performed by: INTERNAL MEDICINE

## 2024-06-26 PROCEDURE — 3074F SYST BP LT 130 MM HG: CPT | Performed by: INTERNAL MEDICINE

## 2024-06-26 PROCEDURE — G8399 PT W/DXA RESULTS DOCUMENT: HCPCS | Performed by: INTERNAL MEDICINE

## 2024-06-26 PROCEDURE — G8427 DOCREV CUR MEDS BY ELIG CLIN: HCPCS | Performed by: INTERNAL MEDICINE

## 2024-06-26 PROCEDURE — 3017F COLORECTAL CA SCREEN DOC REV: CPT | Performed by: INTERNAL MEDICINE

## 2024-06-26 PROCEDURE — 1123F ACP DISCUSS/DSCN MKR DOCD: CPT | Performed by: INTERNAL MEDICINE

## 2024-06-26 PROCEDURE — 99214 OFFICE O/P EST MOD 30 MIN: CPT | Performed by: INTERNAL MEDICINE

## 2024-06-26 RX ORDER — LORAZEPAM 2 MG/1
TABLET ORAL
Qty: 60 TABLET | Refills: 2 | Status: SHIPPED | OUTPATIENT
Start: 2024-06-26 | End: 2024-07-26

## 2024-06-26 ASSESSMENT — PATIENT HEALTH QUESTIONNAIRE - PHQ9
SUM OF ALL RESPONSES TO PHQ QUESTIONS 1-9: 0
SUM OF ALL RESPONSES TO PHQ9 QUESTIONS 1 & 2: 0
2. FEELING DOWN, DEPRESSED OR HOPELESS: NOT AT ALL
SUM OF ALL RESPONSES TO PHQ QUESTIONS 1-9: 0
1. LITTLE INTEREST OR PLEASURE IN DOING THINGS: NOT AT ALL
SUM OF ALL RESPONSES TO PHQ QUESTIONS 1-9: 0
SUM OF ALL RESPONSES TO PHQ QUESTIONS 1-9: 0

## 2024-06-26 NOTE — PROGRESS NOTES
Iris De La Fuente is a 74 y.o. female and presents with 6 Month Follow-Up  .    Subjective:  Ms. De La Fuente presents today for 6-month follow-up for several problems including hypertension, SVT, impaired glucose tolerance, rheumatoid arthritis, osteoporosis, hyperlipidemia, monitoring statin therapy.  She is followed by rheumatology regularly and has labs done as well.  She had a recent low sodium level and is checking with her cardiologist as to whether she should continue hydrochlorothiazide which was prescribed previously.  She remains on metoprolol succinate 50 mg daily and amlodipine 5 mg daily for hypertension with good results.  Her most significant problem is related to her back if she is continue to have significant back pain which is limited her activity.  She is gained a bit of weight as a result of this which has been frustrating for her.  She has received a corticosteroid injection and has follow-up with pain management in the near future.  She has had surgery on her back previously.    Past Medical History:   Diagnosis Date    Acute lower UTI 1/18/2018    Age-related osteoporosis without current pathological fracture 11/9/2022    Annual physical exam 1/18/2018    Arrhythmia 2000    TACHYCARDIA RHYTHM x1 episode 2000    Arthritis     spinal stenosis; previous spine surgery    Cataract 1/18/2018    Chronic insomnia 1/18/2018    Dyslipidemia 1/18/2018    EBT (electron beam tomography) abnormal 1/18/2018    Fibrocystic breast changes 1/18/2018    High cholesterol     HTN (hypertension) 1/18/2018    Inflammatory bowel disease 1/18/2018    Nephrolithiasis 1/18/2018    On statin therapy 1/18/2018    Post hysterectomy menopause 1/18/2018    RA (rheumatoid arthritis) (HCC) 10/8/2020    Sciatica 1/18/2018    Visit for screening mammogram 1/18/2018     Past Surgical History:   Procedure Laterality Date    CATARACT REMOVAL      COLONOSCOPY N/A 1/10/2022    COLONOSCOPY  needs rapid covid performed by Dandre

## 2024-06-26 NOTE — PROGRESS NOTES
Iris De La Fuente is a 74 y.o. female     Chief Complaint   Patient presents with    6 Month Follow-Up       /74 (Site: Left Upper Arm, Position: Sitting, Cuff Size: Medium Adult)   Pulse 68   Temp 97.5 °F (36.4 °C) (Oral)   Resp 18   Ht 1.626 m (5' 4\")   Wt 82.5 kg (181 lb 12.8 oz)   SpO2 97%   BMI 31.21 kg/m²     Health Maintenance Due   Topic Date Due    DTaP/Tdap/Td vaccine (1 - Tdap) Never done    Shingles vaccine (1 of 2) Never done    Respiratory Syncytial Virus (RSV) Pregnant or age 60 yrs+ (1 - 1-dose 60+ series) Never done    Pneumococcal 65+ years Vaccine (1 of 1 - PCV) Never done    Breast cancer screen  02/06/2020    COVID-19 Vaccine (3 - 2023-24 season) 09/01/2023         \"Have you been to the ER, urgent care clinic since your last visit?  Hospitalized since your last visit?\"    NO    “Have you seen or consulted any other health care providers outside of [facility] since your last visit?”    Yes, [facility]        Have you had a mammogram?”   NO    Date of last Mammogram: 2/6/2018

## 2024-06-27 LAB
CHOLEST SERPL-MCNC: 201 MG/DL
HDLC SERPL-MCNC: 66 MG/DL
HDLC SERPL: 3 (ref 0–5)
LDLC SERPL CALC-MCNC: 104 MG/DL (ref 0–100)
TRIGL SERPL-MCNC: 155 MG/DL
VLDLC SERPL CALC-MCNC: 31 MG/DL

## 2024-08-06 ENCOUNTER — OFFICE VISIT (OUTPATIENT)
Facility: CLINIC | Age: 74
End: 2024-08-06
Payer: MEDICARE

## 2024-08-06 VITALS
SYSTOLIC BLOOD PRESSURE: 124 MMHG | TEMPERATURE: 98.1 F | BODY MASS INDEX: 30.63 KG/M2 | DIASTOLIC BLOOD PRESSURE: 80 MMHG | HEART RATE: 65 BPM | HEIGHT: 64 IN | RESPIRATION RATE: 17 BRPM | WEIGHT: 179.4 LBS | OXYGEN SATURATION: 95 %

## 2024-08-06 DIAGNOSIS — R49.0 HOARSENESS OF VOICE: Primary | ICD-10-CM

## 2024-08-06 PROCEDURE — G8417 CALC BMI ABV UP PARAM F/U: HCPCS | Performed by: INTERNAL MEDICINE

## 2024-08-06 PROCEDURE — 3074F SYST BP LT 130 MM HG: CPT | Performed by: INTERNAL MEDICINE

## 2024-08-06 PROCEDURE — 3079F DIAST BP 80-89 MM HG: CPT | Performed by: INTERNAL MEDICINE

## 2024-08-06 PROCEDURE — 1123F ACP DISCUSS/DSCN MKR DOCD: CPT | Performed by: INTERNAL MEDICINE

## 2024-08-06 PROCEDURE — G8399 PT W/DXA RESULTS DOCUMENT: HCPCS | Performed by: INTERNAL MEDICINE

## 2024-08-06 PROCEDURE — 1090F PRES/ABSN URINE INCON ASSESS: CPT | Performed by: INTERNAL MEDICINE

## 2024-08-06 PROCEDURE — 1036F TOBACCO NON-USER: CPT | Performed by: INTERNAL MEDICINE

## 2024-08-06 PROCEDURE — 99214 OFFICE O/P EST MOD 30 MIN: CPT | Performed by: INTERNAL MEDICINE

## 2024-08-06 PROCEDURE — 3017F COLORECTAL CA SCREEN DOC REV: CPT | Performed by: INTERNAL MEDICINE

## 2024-08-06 PROCEDURE — G8428 CUR MEDS NOT DOCUMENT: HCPCS | Performed by: INTERNAL MEDICINE

## 2024-08-06 RX ORDER — BENZONATATE 200 MG/1
200 CAPSULE ORAL 3 TIMES DAILY PRN
Qty: 30 CAPSULE | Refills: 0 | Status: SHIPPED | OUTPATIENT
Start: 2024-08-06 | End: 2024-08-16

## 2024-08-06 RX ORDER — AZITHROMYCIN 250 MG/1
TABLET, FILM COATED ORAL
Qty: 6 TABLET | Refills: 0 | Status: SHIPPED | OUTPATIENT
Start: 2024-08-06 | End: 2024-08-16

## 2024-08-06 NOTE — PROGRESS NOTES
Iris De La Fuente is a 74 y.o. female     Chief Complaint   Patient presents with    hoarseness for 4 weeks        /80 (Site: Left Upper Arm, Position: Sitting, Cuff Size: Medium Adult)   Pulse 65   Temp 98.1 °F (36.7 °C) (Oral)   Resp 17   Ht 1.626 m (5' 4\")   Wt 81.4 kg (179 lb 6.4 oz)   SpO2 95%   BMI 30.79 kg/m²     Health Maintenance Due   Topic Date Due    DTaP/Tdap/Td vaccine (1 - Tdap) Never done    Shingles vaccine (1 of 2) Never done    Respiratory Syncytial Virus (RSV) Pregnant or age 60 yrs+ (1 - 1-dose 60+ series) Never done    Pneumococcal 65+ years Vaccine (1 of 1 - PCV) Never done    Breast cancer screen  02/06/2020    COVID-19 Vaccine (3 - 2023-24 season) 09/01/2023    Flu vaccine (1) Never done         \"Have you been to the ER, urgent care clinic since your last visit?  Hospitalized since your last visit?\"    NO    “Have you seen or consulted any other health care providers outside of [facility] since your last visit?”    NO       Have you had a mammogram?”   NO      Date of last Mammogram: 2/6/2018

## 2024-08-06 NOTE — PROGRESS NOTES
Iris De La Fuente is a 74 y.o. female and presents with hoarseness for 4 weeks   .    Subjective:  Soumya presents today with complaint of hoarseness of voice that is been persistent over the past 4 weeks.  She had initially had significant postnasal drainage and cough.  Drainage was yellow to green in color.  She has had no fever or chills.  Her symptoms have improved overall but her hoarseness has persisted.  She has had no hemoptysis.  She has had no weight loss.    Past Medical History:   Diagnosis Date    Acute lower UTI 1/18/2018    Age-related osteoporosis without current pathological fracture 11/9/2022    Annual physical exam 1/18/2018    Arrhythmia 2000    TACHYCARDIA RHYTHM x1 episode 2000    Arthritis     spinal stenosis; previous spine surgery    Cataract 1/18/2018    Chronic insomnia 1/18/2018    Dyslipidemia 1/18/2018    EBT (electron beam tomography) abnormal 1/18/2018    Fibrocystic breast changes 1/18/2018    High cholesterol     HTN (hypertension) 1/18/2018    Inflammatory bowel disease 1/18/2018    Nephrolithiasis 1/18/2018    On statin therapy 1/18/2018    Post hysterectomy menopause 1/18/2018    RA (rheumatoid arthritis) (HCC) 10/8/2020    Sciatica 1/18/2018    Visit for screening mammogram 1/18/2018     Past Surgical History:   Procedure Laterality Date    CATARACT REMOVAL      COLONOSCOPY N/A 1/10/2022    COLONOSCOPY  needs rapid covid performed by Sumanth Amos MD at [facility] ENDOSCOPY    GYN      hysterectomy 1980    NEUROLOGICAL SURGERY  6/11    Back; 2 buldging disc fused, bone removed from hip    TONSILLECTOMY      UROLOGICAL SURGERY  2009    URETERAL STENT     Allergies   Allergen Reactions    Levofloxacin Hives     Current Outpatient Medications   Medication Sig Dispense Refill    azithromycin (ZITHROMAX) 250 MG tablet 500mg on day 1 followed by 250mg on days 2 - 5 6 tablet 0    benzonatate (TESSALON) 200 MG capsule Take 1 capsule by mouth 3 times daily as needed for Cough 30

## 2024-08-28 ENCOUNTER — APPOINTMENT (OUTPATIENT)
Facility: HOSPITAL | Age: 74
DRG: 414 | End: 2024-08-28
Attending: GENERAL ACUTE CARE HOSPITAL
Payer: MEDICARE

## 2024-08-28 ENCOUNTER — HOSPITAL ENCOUNTER (INPATIENT)
Facility: HOSPITAL | Age: 74
LOS: 3 days | Discharge: HOME | DRG: 414 | End: 2024-08-31
Attending: GENERAL ACUTE CARE HOSPITAL | Admitting: INTERNAL MEDICINE
Payer: MEDICARE

## 2024-08-28 DIAGNOSIS — I26.99 PULMONARY EMBOLISM AND INFARCTION (HCC): ICD-10-CM

## 2024-08-28 DIAGNOSIS — K81.0 ACUTE CHOLECYSTITIS: Primary | ICD-10-CM

## 2024-08-28 LAB
ALBUMIN SERPL-MCNC: 3.2 G/DL (ref 3.5–5)
ALBUMIN/GLOB SERPL: 1 (ref 1.1–2.2)
ALP SERPL-CCNC: 166 U/L (ref 45–117)
ALT SERPL-CCNC: 137 U/L (ref 12–78)
ANION GAP SERPL CALC-SCNC: 7 MMOL/L (ref 5–15)
AST SERPL-CCNC: 142 U/L (ref 15–37)
BASOPHILS # BLD: 0 K/UL (ref 0–0.1)
BASOPHILS NFR BLD: 0 % (ref 0–1)
BILIRUB DIRECT SERPL-MCNC: 2.4 MG/DL (ref 0–0.2)
BILIRUB SERPL-MCNC: 4.1 MG/DL (ref 0.2–1)
BUN SERPL-MCNC: 5 MG/DL (ref 6–20)
BUN/CREAT SERPL: 8 (ref 12–20)
CALCIUM SERPL-MCNC: 8.8 MG/DL (ref 8.5–10.1)
CHLORIDE SERPL-SCNC: 102 MMOL/L (ref 97–108)
CO2 SERPL-SCNC: 24 MMOL/L (ref 21–32)
CREAT SERPL-MCNC: 0.59 MG/DL (ref 0.55–1.02)
DIFFERENTIAL METHOD BLD: ABNORMAL
ECHO BSA: 1.96 M2
EOSINOPHIL # BLD: 0.1 K/UL (ref 0–0.4)
EOSINOPHIL NFR BLD: 1 % (ref 0–7)
ERYTHROCYTE [DISTWIDTH] IN BLOOD BY AUTOMATED COUNT: 13.7 % (ref 11.5–14.5)
GLOBULIN SER CALC-MCNC: 3.2 G/DL (ref 2–4)
GLUCOSE SERPL-MCNC: 117 MG/DL (ref 65–100)
HCT VFR BLD AUTO: 22.3 % (ref 35–47)
HGB BLD-MCNC: 7.7 G/DL (ref 11.5–16)
IMM GRANULOCYTES # BLD AUTO: 0.1 K/UL (ref 0–0.04)
IMM GRANULOCYTES NFR BLD AUTO: 1 % (ref 0–0.5)
INR PPP: 1 (ref 0.9–1.1)
LYMPHOCYTES # BLD: 0.4 K/UL (ref 0.8–3.5)
LYMPHOCYTES NFR BLD: 5 % (ref 12–49)
MAGNESIUM SERPL-MCNC: 1.8 MG/DL (ref 1.6–2.4)
MCH RBC QN AUTO: 34.4 PG (ref 26–34)
MCHC RBC AUTO-ENTMCNC: 34.5 G/DL (ref 30–36.5)
MCV RBC AUTO: 99.6 FL (ref 80–99)
MONOCYTES # BLD: 0.7 K/UL (ref 0–1)
MONOCYTES NFR BLD: 8 % (ref 5–13)
NEUTS SEG # BLD: 7.5 K/UL (ref 1.8–8)
NEUTS SEG NFR BLD: 85 % (ref 32–75)
NRBC # BLD: 0 K/UL (ref 0–0.01)
NRBC BLD-RTO: 0 PER 100 WBC
PHOSPHATE SERPL-MCNC: 2.2 MG/DL (ref 2.6–4.7)
PLATELET # BLD AUTO: 139 K/UL (ref 150–400)
PMV BLD AUTO: 10.1 FL (ref 8.9–12.9)
POTASSIUM SERPL-SCNC: 3.2 MMOL/L (ref 3.5–5.1)
PROT SERPL-MCNC: 6.4 G/DL (ref 6.4–8.2)
PROTHROMBIN TIME: 10.9 SEC (ref 9–11.1)
RBC # BLD AUTO: 2.24 M/UL (ref 3.8–5.2)
RBC MORPH BLD: ABNORMAL
SODIUM SERPL-SCNC: 133 MMOL/L (ref 136–145)
WBC # BLD AUTO: 8.8 K/UL (ref 3.6–11)

## 2024-08-28 PROCEDURE — 2580000003 HC RX 258: Performed by: INTERNAL MEDICINE

## 2024-08-28 PROCEDURE — 93970 EXTREMITY STUDY: CPT

## 2024-08-28 PROCEDURE — 0FT40ZZ RESECTION OF GALLBLADDER, OPEN APPROACH: ICD-10-PCS | Performed by: SURGERY

## 2024-08-28 PROCEDURE — 87040 BLOOD CULTURE FOR BACTERIA: CPT

## 2024-08-28 PROCEDURE — 36415 COLL VENOUS BLD VENIPUNCTURE: CPT

## 2024-08-28 PROCEDURE — 0FC98ZZ EXTIRPATION OF MATTER FROM COMMON BILE DUCT, VIA NATURAL OR ARTIFICIAL OPENING ENDOSCOPIC: ICD-10-PCS | Performed by: INTERNAL MEDICINE

## 2024-08-28 PROCEDURE — 6370000000 HC RX 637 (ALT 250 FOR IP): Performed by: INTERNAL MEDICINE

## 2024-08-28 PROCEDURE — 85610 PROTHROMBIN TIME: CPT

## 2024-08-28 PROCEDURE — 6360000002 HC RX W HCPCS: Performed by: INTERNAL MEDICINE

## 2024-08-28 PROCEDURE — 80048 BASIC METABOLIC PNL TOTAL CA: CPT

## 2024-08-28 PROCEDURE — 0F798ZZ DILATION OF COMMON BILE DUCT, VIA NATURAL OR ARTIFICIAL OPENING ENDOSCOPIC: ICD-10-PCS | Performed by: INTERNAL MEDICINE

## 2024-08-28 PROCEDURE — 0FT44ZZ RESECTION OF GALLBLADDER, PERCUTANEOUS ENDOSCOPIC APPROACH: ICD-10-PCS | Performed by: SURGERY

## 2024-08-28 PROCEDURE — 80076 HEPATIC FUNCTION PANEL: CPT

## 2024-08-28 PROCEDURE — 70450 CT HEAD/BRAIN W/O DYE: CPT

## 2024-08-28 PROCEDURE — 2060000000 HC ICU INTERMEDIATE R&B

## 2024-08-28 PROCEDURE — 99222 1ST HOSP IP/OBS MODERATE 55: CPT | Performed by: STUDENT IN AN ORGANIZED HEALTH CARE EDUCATION/TRAINING PROGRAM

## 2024-08-28 PROCEDURE — 76937 US GUIDE VASCULAR ACCESS: CPT

## 2024-08-28 PROCEDURE — 83735 ASSAY OF MAGNESIUM: CPT

## 2024-08-28 PROCEDURE — 84100 ASSAY OF PHOSPHORUS: CPT

## 2024-08-28 PROCEDURE — 76705 ECHO EXAM OF ABDOMEN: CPT

## 2024-08-28 PROCEDURE — 85025 COMPLETE CBC W/AUTO DIFF WBC: CPT

## 2024-08-28 RX ORDER — ACETAMINOPHEN 325 MG/1
650 TABLET ORAL EVERY 6 HOURS PRN
Status: DISCONTINUED | OUTPATIENT
Start: 2024-08-28 | End: 2024-08-31 | Stop reason: HOSPADM

## 2024-08-28 RX ORDER — INDOMETHACIN 100 MG
100 SUPPOSITORY, RECTAL RECTAL ONCE
Status: DISCONTINUED | OUTPATIENT
Start: 2024-08-28 | End: 2024-08-29

## 2024-08-28 RX ORDER — SODIUM CHLORIDE 0.9 % (FLUSH) 0.9 %
5-40 SYRINGE (ML) INJECTION PRN
Status: DISCONTINUED | OUTPATIENT
Start: 2024-08-28 | End: 2024-08-31 | Stop reason: HOSPADM

## 2024-08-28 RX ORDER — DEXTROSE MONOHYDRATE AND SODIUM CHLORIDE 5; .45 G/100ML; G/100ML
INJECTION, SOLUTION INTRAVENOUS CONTINUOUS
Status: ACTIVE | OUTPATIENT
Start: 2024-08-28 | End: 2024-08-30

## 2024-08-28 RX ORDER — FOLIC ACID 1 MG/1
1 TABLET ORAL DAILY
Status: DISCONTINUED | OUTPATIENT
Start: 2024-08-28 | End: 2024-08-31 | Stop reason: HOSPADM

## 2024-08-28 RX ORDER — ACETAMINOPHEN 650 MG/1
650 SUPPOSITORY RECTAL EVERY 6 HOURS PRN
Status: DISCONTINUED | OUTPATIENT
Start: 2024-08-28 | End: 2024-08-31 | Stop reason: HOSPADM

## 2024-08-28 RX ORDER — METOPROLOL SUCCINATE 50 MG/1
100 TABLET, EXTENDED RELEASE ORAL DAILY
Status: DISCONTINUED | OUTPATIENT
Start: 2024-08-29 | End: 2024-08-31 | Stop reason: HOSPADM

## 2024-08-28 RX ORDER — SODIUM CHLORIDE 0.9 % (FLUSH) 0.9 %
5-40 SYRINGE (ML) INJECTION PRN
Status: DISCONTINUED | OUTPATIENT
Start: 2024-08-28 | End: 2024-08-29 | Stop reason: HOSPADM

## 2024-08-28 RX ORDER — SODIUM CHLORIDE 9 MG/ML
25 INJECTION, SOLUTION INTRAVENOUS PRN
Status: DISCONTINUED | OUTPATIENT
Start: 2024-08-28 | End: 2024-08-29 | Stop reason: HOSPADM

## 2024-08-28 RX ORDER — POLYETHYLENE GLYCOL 3350 17 G/17G
17 POWDER, FOR SOLUTION ORAL DAILY PRN
Status: DISCONTINUED | OUTPATIENT
Start: 2024-08-28 | End: 2024-08-31 | Stop reason: HOSPADM

## 2024-08-28 RX ORDER — SODIUM CHLORIDE 0.9 % (FLUSH) 0.9 %
5-40 SYRINGE (ML) INJECTION EVERY 12 HOURS SCHEDULED
Status: DISCONTINUED | OUTPATIENT
Start: 2024-08-28 | End: 2024-08-29 | Stop reason: HOSPADM

## 2024-08-28 RX ORDER — ONDANSETRON 2 MG/ML
4 INJECTION INTRAMUSCULAR; INTRAVENOUS EVERY 6 HOURS PRN
Status: DISCONTINUED | OUTPATIENT
Start: 2024-08-28 | End: 2024-08-31 | Stop reason: HOSPADM

## 2024-08-28 RX ORDER — AMLODIPINE BESYLATE 5 MG/1
5 TABLET ORAL DAILY
Status: DISCONTINUED | OUTPATIENT
Start: 2024-08-29 | End: 2024-08-28

## 2024-08-28 RX ORDER — LORAZEPAM 1 MG/1
1 TABLET ORAL EVERY 12 HOURS PRN
Status: DISCONTINUED | OUTPATIENT
Start: 2024-08-28 | End: 2024-08-31 | Stop reason: HOSPADM

## 2024-08-28 RX ORDER — LEVETIRACETAM 500 MG/5ML
500 INJECTION, SOLUTION, CONCENTRATE INTRAVENOUS EVERY 12 HOURS
Status: DISCONTINUED | OUTPATIENT
Start: 2024-08-28 | End: 2024-08-31 | Stop reason: HOSPADM

## 2024-08-28 RX ORDER — SODIUM CHLORIDE 0.9 % (FLUSH) 0.9 %
5-40 SYRINGE (ML) INJECTION EVERY 12 HOURS SCHEDULED
Status: DISCONTINUED | OUTPATIENT
Start: 2024-08-28 | End: 2024-08-31 | Stop reason: HOSPADM

## 2024-08-28 RX ORDER — AMLODIPINE BESYLATE 5 MG/1
5 TABLET ORAL DAILY
Status: DISCONTINUED | OUTPATIENT
Start: 2024-08-28 | End: 2024-08-31 | Stop reason: HOSPADM

## 2024-08-28 RX ORDER — ONDANSETRON 4 MG/1
4 TABLET, ORALLY DISINTEGRATING ORAL EVERY 8 HOURS PRN
Status: DISCONTINUED | OUTPATIENT
Start: 2024-08-28 | End: 2024-08-31 | Stop reason: HOSPADM

## 2024-08-28 RX ORDER — SODIUM CHLORIDE 9 MG/ML
INJECTION, SOLUTION INTRAVENOUS PRN
Status: DISCONTINUED | OUTPATIENT
Start: 2024-08-28 | End: 2024-08-31 | Stop reason: HOSPADM

## 2024-08-28 RX ORDER — VITAMIN B COMPLEX
2000 TABLET ORAL EVERY MORNING
Status: DISCONTINUED | OUTPATIENT
Start: 2024-08-28 | End: 2024-08-31 | Stop reason: HOSPADM

## 2024-08-28 RX ADMIN — DEXTROSE AND SODIUM CHLORIDE: 5; 450 INJECTION, SOLUTION INTRAVENOUS at 12:34

## 2024-08-28 RX ADMIN — SODIUM CHLORIDE: 9 INJECTION, SOLUTION INTRAVENOUS at 13:07

## 2024-08-28 RX ADMIN — Medication 2000 UNITS: at 12:35

## 2024-08-28 RX ADMIN — POTASSIUM BICARBONATE 40 MEQ: 782 TABLET, EFFERVESCENT ORAL at 23:07

## 2024-08-28 RX ADMIN — SODIUM CHLORIDE, PRESERVATIVE FREE 10 ML: 5 INJECTION INTRAVENOUS at 21:58

## 2024-08-28 RX ADMIN — FOLIC ACID 1 MG: 1 TABLET ORAL at 12:36

## 2024-08-28 RX ADMIN — LEVETIRACETAM 500 MG: 100 INJECTION INTRAVENOUS at 12:36

## 2024-08-28 RX ADMIN — PIPERACILLIN AND TAZOBACTAM 3375 MG: 3; .375 INJECTION, POWDER, LYOPHILIZED, FOR SOLUTION INTRAVENOUS at 22:00

## 2024-08-28 RX ADMIN — ACETAMINOPHEN 650 MG: 325 TABLET ORAL at 12:36

## 2024-08-28 RX ADMIN — ACETAMINOPHEN 650 MG: 325 TABLET ORAL at 21:58

## 2024-08-28 RX ADMIN — METOPROLOL SUCCINATE 75 MG: 50 TABLET, EXTENDED RELEASE ORAL at 12:35

## 2024-08-28 RX ADMIN — DIBASIC SODIUM PHOSPHATE, MONOBASIC POTASSIUM PHOSPHATE AND MONOBASIC SODIUM PHOSPHATE 2 TABLET: 852; 155; 130 TABLET ORAL at 23:07

## 2024-08-28 RX ADMIN — LEVETIRACETAM 500 MG: 100 INJECTION INTRAVENOUS at 23:06

## 2024-08-28 RX ADMIN — SODIUM CHLORIDE, PRESERVATIVE FREE 10 ML: 5 INJECTION INTRAVENOUS at 12:37

## 2024-08-28 RX ADMIN — LORAZEPAM 1 MG: 1 TABLET ORAL at 13:08

## 2024-08-28 RX ADMIN — PIPERACILLIN AND TAZOBACTAM 4500 MG: 4; .5 INJECTION, POWDER, LYOPHILIZED, FOR SOLUTION INTRAVENOUS; PARENTERAL at 13:11

## 2024-08-28 ASSESSMENT — PAIN DESCRIPTION - ORIENTATION
ORIENTATION: RIGHT
ORIENTATION: RIGHT

## 2024-08-28 ASSESSMENT — PAIN SCALES - GENERAL
PAINLEVEL_OUTOF10: 3
PAINLEVEL_OUTOF10: 3

## 2024-08-28 ASSESSMENT — PAIN DESCRIPTION - LOCATION
LOCATION: HIP
LOCATION: HIP

## 2024-08-28 ASSESSMENT — PAIN DESCRIPTION - DESCRIPTORS: DESCRIPTORS: ACHING

## 2024-08-28 NOTE — H&P
Hospitalist Admission Note    NAME:   Iris De La Fuente   : 1950   MRN: [id number]     Date/Time: 2024 8:59 AM    Patient PCP: ARIELLE Christiansen MD    ______________________________________________________________________  Given the patient's current clinical presentation, I have a high level of concern for decompensation if discharged from the emergency department.  Complex decision making was performed, which includes reviewing the patient's available past medical records, laboratory results, and x-ray films.       My assessment of this patient's clinical condition and my plan of care is as follows.    Assessment / Plan:  Fever chills and abnormal LFTs with elevated T. bili concerning for cholecystitis/ascending colitis  Gram-negative bacteremia    Will admit to stepdown unit, start Zosyn, get liver ultrasound.  GI consulted  Blood culture from outside facility came back positive with gram-negative rods in 1 out of 2 bottles  Repeat blood culture here, repeat CBC BMP LFTs, magnesium and phosphorus  Allowed to have clear liquid diet per GI, plan for ERCP tomorrow  CT abdomen pelvis showed  IMPRESSION:   1.  There are 2 stones identified within the common duct, compatible with   choledocholithiasis.   2.   Additional findings as above.     Bilateral segmental PE  CT chest showed  There are subsegmental emboli noted within left upper and lower lobe   pulmonary arterial distribution.   2.   Age indeterminate compression fracture involving the superior endplate of   T2, with less than 25% loss of height. Age indeterminate compression fracture   of T3, with approximately 25% loss of height.     Patient is on room air, hematology consulted, check lower extremity duplex.  If positive for DVT will need to consider IVC filter.  Discussed with hematology, contraindication for anticoagulation as patient is at high risk of bleeding due to recent subdural hematoma    Recent subdural hematoma status  postconcussion  Patient had recent small subdural hematoma status post fall, discussed with neurosurgery who deferred to the hospitalist to assess risks versus benefits of the anticoagulation.  Currently patient is high risk of bleeding, is on room air and has a subsegmental PE which are small  We will continue to monitor  If  patient become hypoxic, might need to consult IR for thrombectomy  Continue with Keppra for few more days, recommended by neurosurgery at [facility]    Hyponatremia  Hypokalemia  Hypomagnesemia  Outside lab reviewed, abnormal labs noted on 08/27.  Will need to repeat labs  Unsure hypokalemia and hypomagnesia were addressed at the outside facility    Recent admission  08/25 at [facility]  Closed fracture of multiple thoracic vertebrae with routine healing   Fracture of ulnar styloid   Traumatic hematoma of right hip   Subarachnoid hemorrhage following injury with concussion (CMS-HCC)   Scalp laceration, initial encounter   Hypokalemia     Hypertension  Resume medication   I have spent critical care time involved in lab review, consultations with specialist, family decision-making, and documentation. During this entire length of time I was immediately available to the patient. The reason for providing this level of medical care for this critically ill patient was due to a critical illness that impaired one or more vital organ systems such that there was a high probability of imminent or life threatening deterioration in the patients condition. This care involved high complexity decision making to assess, manipulate, and support vital system functions, to treat this degreee vital organ system failure and to prevent further life threatening deterioration of the patient’s condition.      Personal critical care time:60mn     Medical Decision Making:   I personally reviewed labs: CBC BMP  I personally reviewed imaging: Reviewed CT chest and abdomen pelvis from outside facility  I personally  patient size (includes targeted exams where dose is matched to clinical indication); or iterative reconstruction. Contrast material: OMNIPAQUE 350; Contrast volume: 100 ml; Contrast route: INTRAVENOUS (IV);   COMPARISON: DX XR CHEST 2 VIEWS 8/27/2024 8:42 PM FINDINGS: Pulmonary arteries: There is a subsegmental embolus within a left upper and lower lobe pulmonary arterial distribution. Aorta: Unremarkable. No aortic aneurysm. No aortic dissection. Lungs: Unremarkable. No consolidation. No masses. Pleural spaces: Unremarkable. No pneumothorax. No pleural effusion. Heart: Unremarkable. No cardiomegaly. No pericardial effusion. Heart RV/LV ratio: The RV/LV ratio is 0.72. Lymph nodes: Unremarkable. No enlarged lymph nodes. Intraperitoneal space: Please refer to dedicated CT of the abdomen pelvis for description of upper abdominal findings. Bones/joints: Age indeterminate compression fracture involving the superior endplate of T2, with less than 25% loss of height. Age indeterminate compression fracture of T3, with approximately 25% loss of height. Soft tissues: Unremarkable.     1.   There are subsegmental emboli noted within left upper and lower lobe pulmonary arterial distribution. 2.   Age indeterminate compression fracture involving the superior endplate of T2, with less than 25% loss of height. Age indeterminate compression fracture of T3, with approximately 25% loss of height. THIS DOCUMENT HAS BEEN ELECTRONICALLY VERIFIED BY MARCELLE MANN MD ON 08/28/2024 00:13:31    CT abdomen pelvis w IV contrast    Result Date: 8/28/2024  PROCEDURE INFORMATION: Exam: CT Abdomen And Pelvis With Contrast Exam date and time: 8/27/2024 11:07 PM Age: 74 years old Clinical indication: Other: Elevated lfts/tbili, fever, ? acute process TECHNIQUE: Imaging protocol: Computed tomography of the abdomen and pelvis with contrast. Radiation optimization: All CT scans at this facility use at least one of these dose optimization techniques:

## 2024-08-28 NOTE — PROGRESS NOTES
Patient and family expressed if she were to have any general surgery needs during this admission they would want Dr Luther Cassidy on her case.    1710: pt c/o severe pain to her hips and bottom with any movement and even at rest pt still very uncomfortable d/t pain. Pt and family state she had imaging done at [facility] and didn't show fracture. Notifed MD Angulo regarding pts pain/discomfort. No orders received, MD wants to wait on ct scan before giving 2.5 oxy.

## 2024-08-28 NOTE — CONSULTS
Cancer [place] at [facility]  [address] [address]  W: [phone] F: [phone]  Hematology/ Oncology Consult Note      Reason for consult:     Iris De La Fuente is a 74 y.o. female who we have been asked to see by Dr. Angulo for anticoagulation recommendations given recent subdural hematoma with bilateral PE findings.    Subjective:     Iris De La Fuente is a 74-year-old female patient who was transferred here from [facility] for further evaluation given recent subdural hematoma and admission at [facility].  Now with fever of unknown origin, hyponatremia, elevated LFTs, findings of subsegmental PE, hyponatremia and anemia.  Patient was recently hospitalized at [facility] after a fall that resulted in a closed fracture of thoracic vertebrae, traumatic hematoma of right hip and subarachnoid hemorrhage with subdural hematoma and concussion.  Past medical history includes RA, hypertension, sciatica and arthritis.    Patient seen at the bedside with her .  Discussed briefly overall evaluation with neurosurgery and GI today.  Discussed findings of subsegmental PE in setting of recent subarachnoid hemorrhage and hematoma.  Discussed overall anticoagulation would be very high risk for further bleeding intracranially.  Discussed further evaluation with bilateral lower extremity Dopplers that are pending.  At this time patient appears stable with no increased dyspnea or chest pain.  Patient and her  verbalized understanding and agreement.  Dr. Angulo also present at time of visit at bedside.    Review of Systems:  Pertinent items are noted in the History of Present Illness.       Past Medical History:   Diagnosis Date    Acute lower UTI 1/18/2018    Age-related osteoporosis without current pathological fracture 11/9/2022    Annual physical exam 1/18/2018    Arrhythmia 2000    TACHYCARDIA RHYTHM x1 episode 2000    Arthritis      spinal stenosis; previous spine surgery    Cataract 1/18/2018    Chronic insomnia 1/18/2018    Dyslipidemia 1/18/2018    EBT (electron beam tomography) abnormal 1/18/2018    Fibrocystic breast changes 1/18/2018    High cholesterol     HTN (hypertension) 1/18/2018    Inflammatory bowel disease 1/18/2018    Nephrolithiasis 1/18/2018    On statin therapy 1/18/2018    Post hysterectomy menopause 1/18/2018    RA (rheumatoid arthritis) (HCC) 10/8/2020    Sciatica 1/18/2018    Visit for screening mammogram 1/18/2018     Past Surgical History:   Procedure Laterality Date    CATARACT REMOVAL      COLONOSCOPY N/A 1/10/2022    COLONOSCOPY  needs rapid covid performed by Sumanth Amos MD at [facility] ENDOSCOPY    GYN      hysterectomy 1980    NEUROLOGICAL SURGERY  6/11    Back; 2 buldging disc fused, bone removed from hip    TONSILLECTOMY      UROLOGICAL SURGERY  2009    URETERAL STENT      Family History   Problem Relation Age of Onset    Alcohol Abuse Maternal Grandmother     Heart Disease Maternal Grandmother         MI    Alcohol Abuse Maternal Grandfather     Heart Disease Father         4 VESSEL CABG    Asthma Paternal Grandmother      Social History     Tobacco Use    Smoking status: Never    Smokeless tobacco: Never   Substance Use Topics    Alcohol use: Yes     Alcohol/week: 10.0 standard drinks of alcohol      Current Facility-Administered Medications   Medication Dose Route Frequency Provider Last Rate Last Admin    [START ON 8/29/2024] amLODIPine (NORVASC) tablet 5 mg  5 mg Oral Daily Major Angulo MD        Vitamin D (CHOLECALCIFEROL) tablet 2,000 Units  2,000 Units Oral QAM Major Angulo MD   2,000 Units at 08/28/24 1235    folic acid (FOLVITE) tablet 1 mg  1 mg Oral Daily Major Angulo MD   1 mg at 08/28/24 1236    metoprolol succinate (TOPROL XL) extended release tablet 75 mg  75 mg Oral Daily Major Angulo MD   75 mg at 08/28/24 1235    sodium chloride flush 0.9 % injection 5-40 mL  5-40 mL

## 2024-08-28 NOTE — PROGRESS NOTES
Neurosurgery received a consult request because the pt was diagnosed today? With a PE but there is a history of subdural hematoma  My review of the chart shows she was seen at [facility] in [place] a few days ago after a fall and there is a diagnosis of subdural hematoma  It does not say what treatment if any was done, and if she was even seen by a neurosurgeon  There are currently no notes in the chart for this admission and I do not see any images, CT head etc so I can not comment on this SDH diagnosis. I would suggest ordering one as follow up and defer to the medical team to determine if anticoagulation is necessary to treat the PE in light of the subdural. It may be that the benefits if starting anticoagulation outweigh the risks. Will try to contact hospitalist, uncertain who is the covering hospitalist at this time

## 2024-08-28 NOTE — CARE COORDINATION
Care Management Initial Assessment       RUR: 7% Low RUR  Readmission? No  1st IM letter given? Yes - 08/28  1st  letter given: No     08/28/24 0930   Service Assessment   Patient Orientation Alert and Oriented;Person;Place;Situation;Self   Cognition Alert   History Provided By Patient;Significant Other  (SudhakarKevin  [phone])   Primary Caregiver Self   Accompanied By/Relationship Kevin De La Fuente  [phone]   Support Systems Children;Spouse/Significant Other  (Kevin De La Fuente  [phone])   Patient's Healthcare Decision Maker is: Legal Next of Kin   PCP Verified by CM Yes   Last Visit to PCP Within last 3 months   Prior Functional Level Independent in ADLs/IADLs   Current Functional Level Independent in ADLs/IADLs   Can patient return to prior living arrangement Yes   Ability to make needs known: Good   Family able to assist with home care needs: Yes   Would you like for me to discuss the discharge plan with any other family members/significant others, and if so, who? Yes  (Kevin De La Fuente  [phone])   Financial Resources Medicare   Community Resources None   CM/SW Referral Disease Management Education   Social/Functional History   Lives With Spouse  (Kevin De La Fuente  [phone])   Type of Home House   Home Layout Two level   Home Access Stairs to enter with rails   Entrance Stairs - Number of Steps 5   Entrance Stairs - Rails Both   Home Equipment None   Active  Yes   Discharge Planning   Type of Residence House   Living Arrangements Spouse/Significant Other   Current Services Prior To Admission None   Potential Assistance Needed N/A   Patient expects to be discharged to: House       The  (CM) conducted an initial meeting with the patient at the bedside, formally introducing themselves and clarifying their role in discharge planning and transitional care. Demographic and insurance details were verified for accuracy. Notably, the patient has no prior history with home health,  skilled nursing facilities (SNF), or inpatient rehabilitation (IPR).  The patient resides in a house that is two level with [address] and does not use any DMEs. Moreover, the patient is alert, able to communicate  needs effectively,  independent in ADLS/IADLS, and able to drive. Kevin De La Fuente [phone] is the patient main support system.      Plan A: home with follow up  Plan B: home with home health        Advance Care Planning     General Advance Care Planning (ACP) Conversation    Date of Conversation: 8/28/2024  Conducted with: Patient with Decision Making Capacity  Other persons present: None    Healthcare Decision Maker:   Primary Decision Maker: SudhakarKevin - Spouse - [phone]       Content/Action Overview:  DECLINED ACP Conversation - will revisit periodically  Reviewed DNR/DNI and patient elects Full Code (Attempt Resuscitation)        Length of Voluntary ACP Conversation in minutes:  <16 minutes (Non-Billable)    Denia Mejia, RN  Case Management  [phone]

## 2024-08-28 NOTE — CONSULTS
GI CONSULTATION NOTE  Heidi Mchugh, NP  [phone] NP in-hospital cell phone M-F until 4:30  After 5pm or on weekends, please call  for physician on call    NAME: Iris De La Fuente   :  1950   MRN:  [id number]   Attending: Dr. Angulo  Primary GI: Dr. Amos  Date/Time:  2024 11:48 AM  Assessment:   Choledocholithiasis  Acute PE  Subdural hematoma  74 y.o. female who presents for chills and 101 F and tx from OSH  24 S/p recent fall resulting in subdural hematoma, R lung hematoma and T1/T2/T3 compression fractures  OSH CTAP w/ IV: There are 2 stones identified within the common duct, compatible with choledocholithiasis  OSH CT angio w wo IV: There are subsegmental emboli noted within left upper and lower lobe pulmonary arterial distribution. 2. Age indeterminate compression fracture involving the superior endplate of T2, with less than 25% loss of height. Age indeterminate compression fracture of T3, with approximately 25% loss of height.     Plan:   Plan for ERCP tomorrow with Dr. Amos  Ok for clear liquid diet today, NPO after midnight tonight  Trend LFTs    Plan discussed with Dr. Arevalo  Subjective:     HISTORY OF PRESENT ILLNESS:     Iris De La Fuente is an 74 y.o. female who we are asked to see for complaint of choledocholithiasis.    Patient presented as a transfer from outside hospital secondary to CT imaging noting choledocholithiasis.  She originally presented to [facility] ER due to chills and fevers.  2024 she was hospitalized at [facility] due to a fall and diagnosed with bilateral subdural hematomas, T1/T2/T3 compression fracture, hematoma of the right lung.  She did not require intervention and was discharged home.  CT chest angio at [facility] noted subsegmental emboli in the left upper and lower lobe.  Labs on arrival to ED notable for T. bili 4.4, , ALT 99, .  Hgb 8.8, MCV 98.5, . She has significant R sided bruising and abdominal pain.     CLN  MD Vielka   rosuvastatin (CRESTOR) 10 MG tablet TAKE 1 TABLET BY MOUTH ONE TIME DAILY 11/15/23   ARIELLE Christiansen MD   Cetirizine HCl 10 MG CAPS Take by mouth    Automatic Reconciliation, Ar   Cholecalciferol 50 MCG (2000 UT) TABS Take 1 tablet by mouth every morning    Automatic Reconciliation, Ar   folic acid (FOLVITE) 1 MG tablet Take by mouth daily    Automatic Reconciliation, Ar   methotrexate (RHEUMATREX) 2.5 MG chemo tablet 9 tablets 1/18/18   Automatic Reconciliation, Ar   metoprolol succinate (TOPROL XL) 50 MG extended release tablet Take 1.5 tablets by mouth daily 12/12/22   Automatic Reconciliation, Ar       Patient Active Problem List   Diagnosis    Rheumatoid arthritis involving multiple sites with positive rheumatoid factor (HCC)    Essential hypertension    Sciatica    Inflammatory bowel disease    Cataract    Fibrocystic breast changes    On statin therapy    Dyslipidemia    Chronic insomnia    EBT (electron beam tomography) abnormal    Nephrolithiasis    Spinal stenosis, lumbar region, with neurogenic claudication    Post hysterectomy menopause    Acute lower UTI    Age-related osteoporosis without current pathological fracture    SVT (supraventricular tachycardia) ([facility])    IGT (impaired glucose tolerance)    Pulmonary embolism and infarction ([facility])       REVIEW OF SYSTEMS:    Constitutional: negative fever, negative chills, negative weight loss  Eyes:   negative visual changes  ENT:   negative sore throat, tongue or lip swelling   Respiratory:  negative cough, negative dyspnea  Cards:  negative for chest pain, palpitations, lower extremity edema  GI:   See HPI  :  negative for frequency, dysuria  Integument:  negative for rash and pruritus  Heme:  negative for easy bruising and gum/nose bleeding  Musculoskel: negative for myalgias,  back pain and muscle weakness  Neuro: negative for headaches, dizziness, vertigo  Psych: negative for feelings of anxiety, depression   Objective:   VITALS:     Vitals:    08/28/24 1130   BP: (!) 162/75   Temp: 97.9 °F (36.6 °C)       PHYSICAL EXAM:   General:          Alert, sitting up in bed, cooperative, no distress, appears stated age.    Head:               Normocephalic, without obvious abnormality, atraumatic.  Eyes:               Conjunctivae clear and pale, anicteric sclerae.  Pupils are equal  Nose:               Nares normal. No drainage or sinus tenderness.  Throat:             Lips, mucosa, and tongue normal.  No Thrush  Neck:               Supple, symmetrical,  no adenopathy, thyroid: non tender  Back:               Symmetric,  No CVA tenderness.  Lungs:             CTA bilaterally.  No wheezing/rhonchi/rales.  Chest wall:      No tenderness or deformity. No Accessory muscle use.  Heart:              Regular rate and rhythm,  no murmur, rub or gallop.  Abdomen:        Soft, R sided tenderness,. Not distended.  Bowel sounds normal. No masses  Extremities:     Atraumatic, No cyanosis.  No edema. No clubbing  Skin:                Texture, turgor normal. R sided bruising which extends to RLE  Lymph:            Cervical, supraclavicular normal.  Psych:             Good insight.  Not depressed.  Not anxious or agitated.  Neurologic:      EOMs intact. No facial asymmetry. No aphasia or slurred speech. Normal strength, A/O X 3.     LAB DATA REVIEWED:    No results found for this or any previous visit (from the past 24 hour(s)).    IMAGING RESULTS:  I have personally reviewed the imaging reports      Total time spent with patient: 30 minutes ________________________________________________________________________  Care Plan discussed with:  Patient Y   Family  Y   RN Y              Consultant:       ________________________________________________________________________    ___________________________________________________  Consulting Provider: FREDDY Alas CNP      8/28/2024  11:48 AM

## 2024-08-29 ENCOUNTER — APPOINTMENT (OUTPATIENT)
Facility: HOSPITAL | Age: 74
DRG: 414 | End: 2024-08-29
Attending: GENERAL ACUTE CARE HOSPITAL
Payer: MEDICARE

## 2024-08-29 ENCOUNTER — ANESTHESIA EVENT (OUTPATIENT)
Facility: HOSPITAL | Age: 74
End: 2024-08-29
Payer: MEDICARE

## 2024-08-29 ENCOUNTER — ANESTHESIA (OUTPATIENT)
Facility: HOSPITAL | Age: 74
End: 2024-08-29
Payer: MEDICARE

## 2024-08-29 LAB
ALBUMIN SERPL-MCNC: 2.9 G/DL (ref 3.5–5)
ALBUMIN/GLOB SERPL: 0.9 (ref 1.1–2.2)
ALP SERPL-CCNC: 153 U/L (ref 45–117)
ALT SERPL-CCNC: 128 U/L (ref 12–78)
ANION GAP SERPL CALC-SCNC: 6 MMOL/L (ref 5–15)
AST SERPL-CCNC: 112 U/L (ref 15–37)
BASOPHILS # BLD: 0 K/UL (ref 0–0.1)
BASOPHILS NFR BLD: 1 % (ref 0–1)
BILIRUB DIRECT SERPL-MCNC: 1.6 MG/DL (ref 0–0.2)
BILIRUB SERPL-MCNC: 3 MG/DL (ref 0.2–1)
BUN SERPL-MCNC: 5 MG/DL (ref 6–20)
BUN/CREAT SERPL: 9 (ref 12–20)
CALCIUM SERPL-MCNC: 8.7 MG/DL (ref 8.5–10.1)
CHLORIDE SERPL-SCNC: 101 MMOL/L (ref 97–108)
CO2 SERPL-SCNC: 27 MMOL/L (ref 21–32)
CREAT SERPL-MCNC: 0.58 MG/DL (ref 0.55–1.02)
DIFFERENTIAL METHOD BLD: ABNORMAL
EOSINOPHIL # BLD: 0.2 K/UL (ref 0–0.4)
EOSINOPHIL NFR BLD: 3 % (ref 0–7)
ERYTHROCYTE [DISTWIDTH] IN BLOOD BY AUTOMATED COUNT: 13.8 % (ref 11.5–14.5)
GLOBULIN SER CALC-MCNC: 3.1 G/DL (ref 2–4)
GLUCOSE SERPL-MCNC: 126 MG/DL (ref 65–100)
HCT VFR BLD AUTO: 22.7 % (ref 35–47)
HGB BLD-MCNC: 7.9 G/DL (ref 11.5–16)
IMM GRANULOCYTES # BLD AUTO: 0.1 K/UL (ref 0–0.04)
IMM GRANULOCYTES NFR BLD AUTO: 1 % (ref 0–0.5)
LYMPHOCYTES # BLD: 0.7 K/UL (ref 0.8–3.5)
LYMPHOCYTES NFR BLD: 10 % (ref 12–49)
MCH RBC QN AUTO: 34.2 PG (ref 26–34)
MCHC RBC AUTO-ENTMCNC: 34.8 G/DL (ref 30–36.5)
MCV RBC AUTO: 98.3 FL (ref 80–99)
MONOCYTES # BLD: 0.7 K/UL (ref 0–1)
MONOCYTES NFR BLD: 11 % (ref 5–13)
NEUTS SEG # BLD: 5 K/UL (ref 1.8–8)
NEUTS SEG NFR BLD: 75 % (ref 32–75)
NRBC # BLD: 0 K/UL (ref 0–0.01)
NRBC BLD-RTO: 0 PER 100 WBC
PLATELET # BLD AUTO: 174 K/UL (ref 150–400)
PMV BLD AUTO: 9.5 FL (ref 8.9–12.9)
POTASSIUM SERPL-SCNC: 3.7 MMOL/L (ref 3.5–5.1)
PROT SERPL-MCNC: 6 G/DL (ref 6.4–8.2)
RBC # BLD AUTO: 2.31 M/UL (ref 3.8–5.2)
SODIUM SERPL-SCNC: 134 MMOL/L (ref 136–145)
WBC # BLD AUTO: 6.7 K/UL (ref 3.6–11)

## 2024-08-29 PROCEDURE — 36415 COLL VENOUS BLD VENIPUNCTURE: CPT

## 2024-08-29 PROCEDURE — 2709999900 HC NON-CHARGEABLE SUPPLY: Performed by: INTERNAL MEDICINE

## 2024-08-29 PROCEDURE — 2060000000 HC ICU INTERMEDIATE R&B

## 2024-08-29 PROCEDURE — 80076 HEPATIC FUNCTION PANEL: CPT

## 2024-08-29 PROCEDURE — 85025 COMPLETE CBC W/AUTO DIFF WBC: CPT

## 2024-08-29 PROCEDURE — 2500000003 HC RX 250 WO HCPCS: Performed by: NURSE ANESTHETIST, CERTIFIED REGISTERED

## 2024-08-29 PROCEDURE — 2580000003 HC RX 258: Performed by: INTERNAL MEDICINE

## 2024-08-29 PROCEDURE — 7100000000 HC PACU RECOVERY - FIRST 15 MIN: Performed by: INTERNAL MEDICINE

## 2024-08-29 PROCEDURE — 3700000001 HC ADD 15 MINUTES (ANESTHESIA): Performed by: INTERNAL MEDICINE

## 2024-08-29 PROCEDURE — 3600007503: Performed by: INTERNAL MEDICINE

## 2024-08-29 PROCEDURE — 6360000004 HC RX CONTRAST MEDICATION: Performed by: INTERNAL MEDICINE

## 2024-08-29 PROCEDURE — 7100000001 HC PACU RECOVERY - ADDTL 15 MIN: Performed by: INTERNAL MEDICINE

## 2024-08-29 PROCEDURE — 6370000000 HC RX 637 (ALT 250 FOR IP): Performed by: INTERNAL MEDICINE

## 2024-08-29 PROCEDURE — 2580000003 HC RX 258: Performed by: ANESTHESIOLOGY

## 2024-08-29 PROCEDURE — 3600007513: Performed by: INTERNAL MEDICINE

## 2024-08-29 PROCEDURE — 6360000002 HC RX W HCPCS: Performed by: NURSE ANESTHETIST, CERTIFIED REGISTERED

## 2024-08-29 PROCEDURE — 2720000010 HC SURG SUPPLY STERILE: Performed by: INTERNAL MEDICINE

## 2024-08-29 PROCEDURE — 80048 BASIC METABOLIC PNL TOTAL CA: CPT

## 2024-08-29 PROCEDURE — 6360000002 HC RX W HCPCS: Performed by: INTERNAL MEDICINE

## 2024-08-29 PROCEDURE — 3700000000 HC ANESTHESIA ATTENDED CARE: Performed by: INTERNAL MEDICINE

## 2024-08-29 RX ORDER — SODIUM CHLORIDE 9 MG/ML
INJECTION, SOLUTION INTRAVENOUS PRN
Status: DISCONTINUED | OUTPATIENT
Start: 2024-08-29 | End: 2024-08-30 | Stop reason: HOSPADM

## 2024-08-29 RX ORDER — NALOXONE HYDROCHLORIDE 0.4 MG/ML
INJECTION, SOLUTION INTRAMUSCULAR; INTRAVENOUS; SUBCUTANEOUS PRN
Status: DISCONTINUED | OUTPATIENT
Start: 2024-08-29 | End: 2024-08-29 | Stop reason: HOSPADM

## 2024-08-29 RX ORDER — PHENYLEPHRINE HCL IN 0.9% NACL 0.4MG/10ML
SYRINGE (ML) INTRAVENOUS PRN
Status: DISCONTINUED | OUTPATIENT
Start: 2024-08-29 | End: 2024-08-29 | Stop reason: SDUPTHER

## 2024-08-29 RX ORDER — ONDANSETRON 2 MG/ML
4 INJECTION INTRAMUSCULAR; INTRAVENOUS
Status: DISCONTINUED | OUTPATIENT
Start: 2024-08-29 | End: 2024-08-29 | Stop reason: HOSPADM

## 2024-08-29 RX ORDER — DEXAMETHASONE SODIUM PHOSPHATE 4 MG/ML
INJECTION, SOLUTION INTRA-ARTICULAR; INTRALESIONAL; INTRAMUSCULAR; INTRAVENOUS; SOFT TISSUE PRN
Status: DISCONTINUED | OUTPATIENT
Start: 2024-08-29 | End: 2024-08-29 | Stop reason: SDUPTHER

## 2024-08-29 RX ORDER — SODIUM CHLORIDE 0.9 % (FLUSH) 0.9 %
5-40 SYRINGE (ML) INJECTION PRN
Status: DISCONTINUED | OUTPATIENT
Start: 2024-08-29 | End: 2024-08-30 | Stop reason: HOSPADM

## 2024-08-29 RX ORDER — GLYCOPYRROLATE 0.2 MG/ML
INJECTION INTRAMUSCULAR; INTRAVENOUS PRN
Status: DISCONTINUED | OUTPATIENT
Start: 2024-08-29 | End: 2024-08-29 | Stop reason: SDUPTHER

## 2024-08-29 RX ORDER — LORAZEPAM 2 MG/1
2 TABLET ORAL EVERY 6 HOURS PRN
COMMUNITY

## 2024-08-29 RX ORDER — SODIUM CHLORIDE 9 MG/ML
INJECTION, SOLUTION INTRAVENOUS PRN
Status: DISCONTINUED | OUTPATIENT
Start: 2024-08-29 | End: 2024-08-29 | Stop reason: HOSPADM

## 2024-08-29 RX ORDER — LIDOCAINE HYDROCHLORIDE 10 MG/ML
1 INJECTION, SOLUTION EPIDURAL; INFILTRATION; INTRACAUDAL; PERINEURAL
Status: DISCONTINUED | OUTPATIENT
Start: 2024-08-29 | End: 2024-08-30 | Stop reason: HOSPADM

## 2024-08-29 RX ORDER — SODIUM CHLORIDE 0.9 % (FLUSH) 0.9 %
5-40 SYRINGE (ML) INJECTION PRN
Status: DISCONTINUED | OUTPATIENT
Start: 2024-08-29 | End: 2024-08-29 | Stop reason: HOSPADM

## 2024-08-29 RX ORDER — SODIUM CHLORIDE, SODIUM LACTATE, POTASSIUM CHLORIDE, CALCIUM CHLORIDE 600; 310; 30; 20 MG/100ML; MG/100ML; MG/100ML; MG/100ML
INJECTION, SOLUTION INTRAVENOUS CONTINUOUS
Status: DISCONTINUED | OUTPATIENT
Start: 2024-08-29 | End: 2024-08-30 | Stop reason: HOSPADM

## 2024-08-29 RX ORDER — FENTANYL CITRATE 50 UG/ML
INJECTION, SOLUTION INTRAMUSCULAR; INTRAVENOUS PRN
Status: DISCONTINUED | OUTPATIENT
Start: 2024-08-29 | End: 2024-08-29 | Stop reason: SDUPTHER

## 2024-08-29 RX ORDER — SODIUM CHLORIDE 0.9 % (FLUSH) 0.9 %
5-40 SYRINGE (ML) INJECTION EVERY 12 HOURS SCHEDULED
Status: DISCONTINUED | OUTPATIENT
Start: 2024-08-29 | End: 2024-08-29 | Stop reason: HOSPADM

## 2024-08-29 RX ORDER — INDOMETHACIN 100 MG
100 SUPPOSITORY, RECTAL RECTAL ONCE
Status: COMPLETED | OUTPATIENT
Start: 2024-08-29 | End: 2024-08-29

## 2024-08-29 RX ORDER — ONDANSETRON 2 MG/ML
INJECTION INTRAMUSCULAR; INTRAVENOUS PRN
Status: DISCONTINUED | OUTPATIENT
Start: 2024-08-29 | End: 2024-08-29 | Stop reason: SDUPTHER

## 2024-08-29 RX ORDER — LIDOCAINE HYDROCHLORIDE 20 MG/ML
INJECTION, SOLUTION EPIDURAL; INFILTRATION; INTRACAUDAL; PERINEURAL PRN
Status: DISCONTINUED | OUTPATIENT
Start: 2024-08-29 | End: 2024-08-29 | Stop reason: SDUPTHER

## 2024-08-29 RX ORDER — FENTANYL CITRATE 50 UG/ML
25 INJECTION, SOLUTION INTRAMUSCULAR; INTRAVENOUS EVERY 5 MIN PRN
Status: DISCONTINUED | OUTPATIENT
Start: 2024-08-29 | End: 2024-08-29 | Stop reason: HOSPADM

## 2024-08-29 RX ORDER — HYDROMORPHONE HYDROCHLORIDE 1 MG/ML
0.5 INJECTION, SOLUTION INTRAMUSCULAR; INTRAVENOUS; SUBCUTANEOUS EVERY 5 MIN PRN
Status: DISCONTINUED | OUTPATIENT
Start: 2024-08-29 | End: 2024-08-29 | Stop reason: HOSPADM

## 2024-08-29 RX ORDER — SODIUM CHLORIDE 0.9 % (FLUSH) 0.9 %
5-40 SYRINGE (ML) INJECTION EVERY 12 HOURS SCHEDULED
Status: DISCONTINUED | OUTPATIENT
Start: 2024-08-29 | End: 2024-08-30 | Stop reason: HOSPADM

## 2024-08-29 RX ORDER — ROCURONIUM BROMIDE 10 MG/ML
INJECTION, SOLUTION INTRAVENOUS PRN
Status: DISCONTINUED | OUTPATIENT
Start: 2024-08-29 | End: 2024-08-29 | Stop reason: SDUPTHER

## 2024-08-29 RX ORDER — DROPERIDOL 2.5 MG/ML
0.62 INJECTION, SOLUTION INTRAMUSCULAR; INTRAVENOUS
Status: DISCONTINUED | OUTPATIENT
Start: 2024-08-29 | End: 2024-08-29 | Stop reason: HOSPADM

## 2024-08-29 RX ADMIN — Medication 80 MCG: at 13:15

## 2024-08-29 RX ADMIN — ROCURONIUM BROMIDE 50 MG: 10 INJECTION INTRAVENOUS at 12:55

## 2024-08-29 RX ADMIN — PIPERACILLIN AND TAZOBACTAM 3375 MG: 3; .375 INJECTION, POWDER, LYOPHILIZED, FOR SOLUTION INTRAVENOUS at 11:39

## 2024-08-29 RX ADMIN — ONDANSETRON HYDROCHLORIDE 4 MG: 2 INJECTION, SOLUTION INTRAMUSCULAR; INTRAVENOUS at 13:11

## 2024-08-29 RX ADMIN — GLYCOPYRROLATE 0.1 MG: 0.2 INJECTION, SOLUTION INTRAMUSCULAR; INTRAVENOUS at 12:55

## 2024-08-29 RX ADMIN — FOLIC ACID 1 MG: 1 TABLET ORAL at 08:51

## 2024-08-29 RX ADMIN — SODIUM CHLORIDE, PRESERVATIVE FREE 10 ML: 5 INJECTION INTRAVENOUS at 08:51

## 2024-08-29 RX ADMIN — Medication 120 MCG: at 13:19

## 2024-08-29 RX ADMIN — FENTANYL CITRATE 100 MCG: 50 INJECTION, SOLUTION INTRAMUSCULAR; INTRAVENOUS at 12:55

## 2024-08-29 RX ADMIN — AMLODIPINE BESYLATE 5 MG: 5 TABLET ORAL at 08:51

## 2024-08-29 RX ADMIN — PIPERACILLIN AND TAZOBACTAM 3375 MG: 3; .375 INJECTION, POWDER, LYOPHILIZED, FOR SOLUTION INTRAVENOUS at 05:09

## 2024-08-29 RX ADMIN — Medication 2000 UNITS: at 08:50

## 2024-08-29 RX ADMIN — SODIUM CHLORIDE, PRESERVATIVE FREE 10 ML: 5 INJECTION INTRAVENOUS at 21:30

## 2024-08-29 RX ADMIN — Medication 100 MG: at 13:20

## 2024-08-29 RX ADMIN — SODIUM CHLORIDE, PRESERVATIVE FREE 10 ML: 5 INJECTION INTRAVENOUS at 08:50

## 2024-08-29 RX ADMIN — SODIUM CHLORIDE, PRESERVATIVE FREE 10 ML: 5 INJECTION INTRAVENOUS at 21:29

## 2024-08-29 RX ADMIN — LEVETIRACETAM 500 MG: 100 INJECTION INTRAVENOUS at 11:33

## 2024-08-29 RX ADMIN — SUGAMMADEX 200 MG: 100 INJECTION, SOLUTION INTRAVENOUS at 13:22

## 2024-08-29 RX ADMIN — DEXTROSE AND SODIUM CHLORIDE: 5; 450 INJECTION, SOLUTION INTRAVENOUS at 06:59

## 2024-08-29 RX ADMIN — LORAZEPAM 1 MG: 1 TABLET ORAL at 22:49

## 2024-08-29 RX ADMIN — PROPOFOL 50 MG: 10 INJECTION, EMULSION INTRAVENOUS at 12:55

## 2024-08-29 RX ADMIN — ACETAMINOPHEN 650 MG: 325 TABLET ORAL at 22:49

## 2024-08-29 RX ADMIN — METOPROLOL SUCCINATE 100 MG: 50 TABLET, EXTENDED RELEASE ORAL at 08:50

## 2024-08-29 RX ADMIN — LIDOCAINE HYDROCHLORIDE 100 MG: 20 INJECTION, SOLUTION EPIDURAL; INFILTRATION; INTRACAUDAL; PERINEURAL at 12:55

## 2024-08-29 RX ADMIN — SODIUM CHLORIDE, POTASSIUM CHLORIDE, SODIUM LACTATE AND CALCIUM CHLORIDE: 600; 310; 30; 20 INJECTION, SOLUTION INTRAVENOUS at 19:58

## 2024-08-29 RX ADMIN — LEVETIRACETAM 500 MG: 100 INJECTION INTRAVENOUS at 22:50

## 2024-08-29 RX ADMIN — DEXAMETHASONE SODIUM PHOSPHATE 8 MG: 4 INJECTION, SOLUTION INTRA-ARTICULAR; INTRALESIONAL; INTRAMUSCULAR; INTRAVENOUS; SOFT TISSUE at 13:11

## 2024-08-29 RX ADMIN — PIPERACILLIN AND TAZOBACTAM 3375 MG: 3; .375 INJECTION, POWDER, LYOPHILIZED, FOR SOLUTION INTRAVENOUS at 20:00

## 2024-08-29 RX ADMIN — PROPOFOL 125 MCG/KG/MIN: 10 INJECTION, EMULSION INTRAVENOUS at 12:56

## 2024-08-29 RX ADMIN — IOTHALAMATE MEGLUMINE 15 ML: 600 INJECTION INTRAVASCULAR at 13:15

## 2024-08-29 ASSESSMENT — PAIN DESCRIPTION - DESCRIPTORS
DESCRIPTORS: ACHING;DISCOMFORT
DESCRIPTORS: ACHING

## 2024-08-29 ASSESSMENT — PAIN SCALES - GENERAL
PAINLEVEL_OUTOF10: 5
PAINLEVEL_OUTOF10: 4

## 2024-08-29 ASSESSMENT — PAIN DESCRIPTION - ORIENTATION
ORIENTATION: RIGHT
ORIENTATION: MID;LOWER

## 2024-08-29 ASSESSMENT — PAIN DESCRIPTION - LOCATION
LOCATION: HIP;PELVIS
LOCATION: BACK

## 2024-08-29 NOTE — FLOWSHEET NOTE
08/29/24 1335   Handoff   Communication Given Transfer Handoff   Handoff phase Phase I receiving   Handoff Given To Rebeca LAFLEUR   Handoff Received From Victoriano LAFLEUR and SHADI Skelton CRNA   Handoff Communication Face to Face;At bedside

## 2024-08-29 NOTE — PROGRESS NOTES
Endoscope was pre-cleaned at bedside immediately following procedure by Sandra LAFLEUR.    1316- Called PACU at this time.     Patient transported with chart and telebox

## 2024-08-29 NOTE — PROGRESS NOTES
End of Shift Note    Bedside shift change report given to CIARRA Lind (oncoming nurse) by Citlali Mchugh RN (offgoing nurse).  Report included the following information SBAR, Kardex, Procedure Summary, Intake/Output, Cardiac Rhythm NSR, and Quality Measures    Shift worked:  07:00-19:00     Shift summary and any significant changes:     Pt had ERCP done, resulting in a sphincterotomy, baloon sweep and the removal of two stones and sludge.  Pt will be going to surgery tomorrow with Dr. Cassidy for a lap nicolle. and noted he would see pt in the AM, procedure for the afternoon. When sleeping pt's O2 goes to 80s, 1L added before procedure pt currently on 2L, wean attempts may be considered after surgical interventions tomorrow.  Caputo has had a noticeable output, pt continues IVF, and is alert and oriented.     Concerns for physician to address:  Possibly consider void trials after surgical interventions, and stopping IVF, output has been significant.     Zone phone for oncoming shift:   0252       Activity:     Number times ambulated in hallways past shift: 0  Number of times OOB to chair past shift: 0    Cardiac:   Cardiac Monitoring: Yes           Access:  Current line(s): PIV     Genitourinary:   Urinary status: caputo    Respiratory:      Chronic home O2 use?: NO  Incentive spirometer at bedside: NO       GI:     Current diet:  ADULT DIET; Clear Liquid  Diet NPO Exceptions are: Sips of Water with Meds  Passing flatus: YES  Tolerating current diet: YES       Pain Management:   Patient states pain is manageable on current regimen: YES    Skin:     Interventions: PT/OT consult and internal/external urinary devices    Patient Safety:  Fall Score:    Interventions: bed/chair alarm and gripper socks       Length of Stay:  Expected LOS: 2  Actual LOS: 1      Citlali Mchugh RN

## 2024-08-29 NOTE — PERIOP NOTE
Please let patient know that paperwork has been received and is with forms completion   TRANSFER - IN REPORT:    Verbal report received from kevon Godwin  being received from PCU for ordered procedure      Report consisted of patient's Situation, Background, Assessment and   Recommendations(SBAR).     Information from the following report(s) Intake/Output, MAR, and Recent Results was reviewed with the receiving nurse.    Opportunity for questions and clarification was provided.      Will give report to primary Endoscopy nurse upon arrival to unit.

## 2024-08-29 NOTE — PROGRESS NOTES
ARRIVAL INFORMATION:  Verified patient name and date of birth, scheduled procedure, and informed consent.     Belongings with patient include:  Tele box    GI FOCUSED ASSESSMENT:  Neuro: Awake, alert, oriented x4  Respiratory: even and unlabored   GI: soft and non-distended  EKG Rhythm: {EKG RHYTHM:70282}    Education:Reviewed general discharge instructions and  information.

## 2024-08-29 NOTE — OP NOTE
NAME:  Iris De La Fuente   :   1950   MRN:   [id number]     YESENIA STARKEY [facility]  Date/Time:  2024 1:30 PM    Procedure Type:   ERCP with biliary sphincterotomy, biliary stone removal     Indications: common bile duct stone  Pre-operative Diagnosis: see indication above  Post-operative Diagnosis:  See findings below  : Sumanth Amos MD  Referring Provider:    , ARIELLE Christiansen MD    Exam:  Airway: clear, no airway problems anticipated  Heart: RRR, without gallops or rubs  Lungs: clear bilaterally without wheezes, crackles, or rhonchi  Abdomen: soft, nontender, nondistended, bowel sounds present  Mental Status: awake, alert and oriented to person, place and time    Sedation:  General anesthesia  Medication: On Zosyn, Indomethacin 100 mg HI  Procedure Details:  After informed consent was obtained with all risks and benefits of procedure explained, the patient was taken to the fluoroscopy suite and placed in the prone position.  Upon sequential sedation as per above, the Olympus duodenoscope [id number]   was inserted via the mouthpeice and carefully advanced to the second portion of the duodenum.   The quality of visualization was good.  The duodenoscope was withdrawn into a short position.      Findings:   Endoscopic: Grossly normal esophagus, stomach, and duodenum when viewed with a duodenoscope    Ampulla: The ampulla was completely within a diverticulum    It was initially difficult to locate the ampulla as it was completely within the diverticulum. After it was located prompt wire guided cannulation was achieved with a 0.025 Jagwire through the Jagtome    Cholangiogram:   Numerous stones/sludge in CBD with resulting upstream biliary dilation as seen on OSH CT    Specimen Removed:  None    Complications: None.   EBL:  None.    Interventions:    Biliary:   Sphincterotomy performed  Balloon sweeps performed with removal of at least two stones and large amount of sludge. The CBD was confirmed

## 2024-08-29 NOTE — PLAN OF CARE
Pt set to have ERCP today, eager to get it done.        Problem: Discharge Planning  Goal: Discharge to home or other facility with appropriate resources  Outcome: Progressing  Flowsheets (Taken 8/29/2024 0800)  Discharge to home or other facility with appropriate resources:   Identify barriers to discharge with patient and caregiver   Arrange for needed discharge resources and transportation as appropriate   Identify discharge learning needs (meds, wound care, etc)     Problem: Pain  Goal: Verbalizes/displays adequate comfort level or baseline comfort level  Outcome: Progressing     Problem: Safety - Adult  Goal: Free from fall injury  Outcome: Progressing     Problem: Gastrointestinal - Adult  Goal: Maintains or returns to baseline bowel function  Outcome: Progressing     Problem: Metabolic/Fluid and Electrolytes - Adult  Goal: Electrolytes maintained within normal limits  Outcome: Progressing     Problem: Hematologic - Adult  Goal: Maintains hematologic stability  Outcome: Progressing

## 2024-08-29 NOTE — ANESTHESIA POSTPROCEDURE EVALUATION
Department of Anesthesiology  Postprocedure Note    Patient: Iris De La Fuente  MRN: [id number]  YOB: 1950  Date of evaluation: 8/29/2024    Procedure Summary       Date: 08/29/24 Room / Location: [facility] 04 / [facility] ENDOSCOPY    Anesthesia Start: 1246 Anesthesia Stop: 1341    Procedure: ENDOSCOPIC RETROGRADE CHOLANGIOPANCREATOGRAPHY Diagnosis:       Acute cholangitis      (Acute cholangitis [K83.09])    Surgeons: Sumanth Amos MD Responsible Provider: Caryl Pierre MD    Anesthesia Type: general ASA Status: 3            Anesthesia Type: No value filed.    Susy Phase I: Susy Score: 8    Susy Phase II:      Anesthesia Post Evaluation    Patient location during evaluation: bedside  Patient participation: complete - patient participated  Level of consciousness: awake and alert  Pain scale: Controlled per protocol.  Airway patency: patent  Nausea & Vomiting: no nausea and no vomiting  Cardiovascular status: hemodynamically stable  Respiratory status: acceptable  Hydration status: stable  Multimodal analgesia pain management approach  Pain management: adequate    No notable events documented.

## 2024-08-29 NOTE — PROGRESS NOTES
Nursing contacted Nocturnist/cross cover provider via non-urgent messaging system [employer] and notified patient asking for additional dose of ativan, received 1mg around 1300 as is ordered bid prn, states she takes 2mg at home, typically at night, unable to relax or sleep, asking for a dose now. No other concerns reported. No acute distress reported. No other information provided by nurse. VSS.     Ordered nurse ok to give the prn dose early- now x1, and asked nurse to msg dayshift md in the am to re-address. Will defer further evaluation/management to the day shift primary attending care team. Patient denies any further complaints or concerns.     Nursing to notify Hospitalist for further/continued concerns. Will remain available overnight for further concerns if nursing/patient needs. Please note, there are RRT systems in this hospital in place that if nursing has acute or critical patient condition change or concern, this is to help facilitate and notify that patient needs immediate bedside evaluation by a provider.     Non-billable note.

## 2024-08-29 NOTE — PROGRESS NOTES
Hospitalist Progress Note    NAME:   Iris De La Fuente   : 1950   MRN: [id number]     Date/Time: 2024 8:54 AM  Patient PCP: ARIELLE Christiansen MD    Estimated discharge date: 2024  Barriers: Needs cholecystectomy, GI clearance      Assessment / Plan:  Fever and chills  Likely ascending cholangitis  Gram-negative bacteremia-resolved from outside facility  Choledocholithiasis   Patient admitted to medical stepdown  Ultrasound abdomen done on 2022-hepatic steatosis  Liver function panel:  Alkaline phosphatase 166>> 153  >> 128  >> 112  Bilirubin 4.1>> 3.0  Bilirubin direct 2.4>> 1.6  GI has been consulted who took the patient for ERCP today  Patient is status post ERCP with removal of 2 stones and biliary sludge.  N.p.o. past midnight  Surgery consulted-for cholecystectomy tomorrow  Repeat blood cultures ordered will follow-up  Continue Zosyn for now     Bilateral segmental PE  CT chest showed  There are subsegmental emboli noted within left upper and lower lobe  pulmonary arterial distribution. 2.   Age indeterminate compression fracture involving the superior endplate of T2, with less than 25% loss of height. Age indeterminate compression fracture  of T3, with approximately 25% loss of height.   Ultrasound Doppler negative for DVT lower extremity  Discussed with hematology, contraindication for anticoagulation as patient is at high risk of bleeding due to recent subdural hematoma  Patient will not be started on any anticoagulation at this moment     Recent subdural hematoma status postconcussion  CT head repeated here-3 mm left posterior-parafalcine hyperattenuating focus consistent with reported history of subdural hematoma at outside facility.  We will continue to monitor  Continue with Keppra for few more days, recommended by neurosurgery at [facility]  Neurosurgery consulted here-advised for new CT scan.  Patient's mentation remains at baseline.

## 2024-08-29 NOTE — ANESTHESIA PRE PROCEDURE
Department of Anesthesiology  Preprocedure Note       Name:  Iris De La Fuente   Age:  74 y.o.  :  1950                                          MRN:  [id number]         Date:  2024      Surgeon: Surgeon(s):  Sumanth Amos MD    Procedure: Procedure(s):  ENDOSCOPIC RETROGRADE CHOLANGIOPANCREATOGRAPHY    Medications prior to admission:   Prior to Admission medications    Medication Sig Start Date End Date Taking? Authorizing Provider   TURMERIC PO Take by mouth daily    Vielka Ponce MD   amLODIPine Benzoate 1 MG/ML SUSP 5 ml Orally Once a day    Vielka Ponce MD   rosuvastatin (CRESTOR) 10 MG tablet TAKE 1 TABLET BY MOUTH ONE TIME DAILY 11/15/23   ARIELLE Christiansen MD   Cetirizine HCl 10 MG CAPS Take by mouth    Automatic Reconciliation, Ar   Cholecalciferol 50 MCG (2000) TABS Take 1 tablet by mouth every morning    Automatic Reconciliation, Ar   folic acid (FOLVITE) 1 MG tablet Take by mouth daily    Automatic Reconciliation, Ar   methotrexate (RHEUMATREX) 2.5 MG chemo tablet 9 tablets 18   Automatic Reconciliation, Ar   metoprolol succinate (TOPROL XL) 50 MG extended release tablet Take 1.5 tablets by mouth daily 22   Automatic Reconciliation, Ar       Current medications:    Current Facility-Administered Medications   Medication Dose Route Frequency Provider Last Rate Last Admin   • indomethacin (INDOCIN) 100 MG suppository 100 mg  100 mg Rectal Once Sumanth Amos MD       • Vitamin D (CHOLECALCIFEROL) tablet 2,000 Units  2,000 Units Oral QAM Major Angulo MD   2,000 Units at 24 0850   • folic acid (FOLVITE) tablet 1 mg  1 mg Oral Daily Major Angulo MD   1 mg at 24 0851   • sodium chloride flush 0.9 % injection 5-40 mL  5-40 mL IntraVENous 2 times per day Major Angulo MD   10 mL at 24 0850   • sodium chloride flush 0.9 % injection 5-40 mL  5-40 mL IntraVENous PRN Major Angulo MD       • 0.9 % sodium chloride infusion

## 2024-08-29 NOTE — FLOWSHEET NOTE
08/29/24 1420   Handoff   Communication Given Transfer Handoff   Handoff phase Phase I receiving   Handoff Given To Citlali LAFLEUR   Handoff Received From Rebeca LAFLEUR   Handoff Communication Telephone

## 2024-08-29 NOTE — PROGRESS NOTES
Interventional GI Attending Note    See detailed GI consult note. In brief pt w/ biliary obstruction/elevated LFT's. CT personally reviewed with Radiology and pt with at least two CBD stones. D/w pt extensively RE: risks/benefits and she agrees with proceeding    Sumanth Amos MD

## 2024-08-30 ENCOUNTER — APPOINTMENT (OUTPATIENT)
Facility: HOSPITAL | Age: 74
DRG: 414 | End: 2024-08-30
Attending: GENERAL ACUTE CARE HOSPITAL
Payer: MEDICARE

## 2024-08-30 ENCOUNTER — ANESTHESIA EVENT (OUTPATIENT)
Facility: HOSPITAL | Age: 74
End: 2024-08-30
Payer: MEDICARE

## 2024-08-30 ENCOUNTER — ANESTHESIA (OUTPATIENT)
Facility: HOSPITAL | Age: 74
End: 2024-08-30
Payer: MEDICARE

## 2024-08-30 LAB
ALBUMIN SERPL-MCNC: 3.1 G/DL (ref 3.5–5)
ALBUMIN/GLOB SERPL: 0.9 (ref 1.1–2.2)
ALP SERPL-CCNC: 151 U/L (ref 45–117)
ALT SERPL-CCNC: 115 U/L (ref 12–78)
ANION GAP SERPL CALC-SCNC: 9 MMOL/L (ref 5–15)
AST SERPL-CCNC: 60 U/L (ref 15–37)
BASOPHILS # BLD: 0 K/UL (ref 0–0.1)
BASOPHILS NFR BLD: 0 % (ref 0–1)
BILIRUB DIRECT SERPL-MCNC: 0.9 MG/DL (ref 0–0.2)
BILIRUB SERPL-MCNC: 2.2 MG/DL (ref 0.2–1)
BUN SERPL-MCNC: 6 MG/DL (ref 6–20)
BUN/CREAT SERPL: 12 (ref 12–20)
CALCIUM SERPL-MCNC: 9.1 MG/DL (ref 8.5–10.1)
CHLORIDE SERPL-SCNC: 102 MMOL/L (ref 97–108)
CO2 SERPL-SCNC: 23 MMOL/L (ref 21–32)
CREAT SERPL-MCNC: 0.51 MG/DL (ref 0.55–1.02)
DIFFERENTIAL METHOD BLD: ABNORMAL
EOSINOPHIL # BLD: 0 K/UL (ref 0–0.4)
EOSINOPHIL NFR BLD: 0 % (ref 0–7)
ERYTHROCYTE [DISTWIDTH] IN BLOOD BY AUTOMATED COUNT: 13.4 % (ref 11.5–14.5)
GLOBULIN SER CALC-MCNC: 3.6 G/DL (ref 2–4)
GLUCOSE SERPL-MCNC: 138 MG/DL (ref 65–100)
HCT VFR BLD AUTO: 25.5 % (ref 35–47)
HGB BLD-MCNC: 8.9 G/DL (ref 11.5–16)
IMM GRANULOCYTES # BLD AUTO: 0 K/UL (ref 0–0.04)
IMM GRANULOCYTES NFR BLD AUTO: 0 % (ref 0–0.5)
LYMPHOCYTES # BLD: 0.7 K/UL (ref 0.8–3.5)
LYMPHOCYTES NFR BLD: 11 % (ref 12–49)
MAGNESIUM SERPL-MCNC: 1.9 MG/DL (ref 1.6–2.4)
MCH RBC QN AUTO: 33.3 PG (ref 26–34)
MCHC RBC AUTO-ENTMCNC: 34.9 G/DL (ref 30–36.5)
MCV RBC AUTO: 95.5 FL (ref 80–99)
MONOCYTES # BLD: 0.5 K/UL (ref 0–1)
MONOCYTES NFR BLD: 8 % (ref 5–13)
NEUTS SEG # BLD: 5.1 K/UL (ref 1.8–8)
NEUTS SEG NFR BLD: 81 % (ref 32–75)
NRBC # BLD: 0.02 K/UL (ref 0–0.01)
NRBC BLD-RTO: 0.3 PER 100 WBC
PLATELET # BLD AUTO: 208 K/UL (ref 150–400)
PMV BLD AUTO: 9.1 FL (ref 8.9–12.9)
POTASSIUM SERPL-SCNC: 3.5 MMOL/L (ref 3.5–5.1)
PROT SERPL-MCNC: 6.7 G/DL (ref 6.4–8.2)
RBC # BLD AUTO: 2.67 M/UL (ref 3.8–5.2)
RBC MORPH BLD: ABNORMAL
SODIUM SERPL-SCNC: 134 MMOL/L (ref 136–145)
WBC # BLD AUTO: 6.3 K/UL (ref 3.6–11)

## 2024-08-30 PROCEDURE — 2580000003 HC RX 258: Performed by: ANESTHESIOLOGY

## 2024-08-30 PROCEDURE — 6370000000 HC RX 637 (ALT 250 FOR IP): Performed by: SURGERY

## 2024-08-30 PROCEDURE — 6360000002 HC RX W HCPCS: Performed by: SURGERY

## 2024-08-30 PROCEDURE — 99222 1ST HOSP IP/OBS MODERATE 55: CPT | Performed by: SURGERY

## 2024-08-30 PROCEDURE — 36415 COLL VENOUS BLD VENIPUNCTURE: CPT

## 2024-08-30 PROCEDURE — 6360000002 HC RX W HCPCS

## 2024-08-30 PROCEDURE — 3700000000 HC ANESTHESIA ATTENDED CARE: Performed by: SURGERY

## 2024-08-30 PROCEDURE — 85025 COMPLETE CBC W/AUTO DIFF WBC: CPT

## 2024-08-30 PROCEDURE — 80048 BASIC METABOLIC PNL TOTAL CA: CPT

## 2024-08-30 PROCEDURE — 7100000001 HC PACU RECOVERY - ADDTL 15 MIN: Performed by: SURGERY

## 2024-08-30 PROCEDURE — 80076 HEPATIC FUNCTION PANEL: CPT

## 2024-08-30 PROCEDURE — 83735 ASSAY OF MAGNESIUM: CPT

## 2024-08-30 PROCEDURE — 2580000003 HC RX 258

## 2024-08-30 PROCEDURE — 7100000000 HC PACU RECOVERY - FIRST 15 MIN: Performed by: SURGERY

## 2024-08-30 PROCEDURE — 6370000000 HC RX 637 (ALT 250 FOR IP): Performed by: INTERNAL MEDICINE

## 2024-08-30 PROCEDURE — 2580000003 HC RX 258: Performed by: SURGERY

## 2024-08-30 PROCEDURE — 6360000002 HC RX W HCPCS: Performed by: INTERNAL MEDICINE

## 2024-08-30 PROCEDURE — 3700000001 HC ADD 15 MINUTES (ANESTHESIA): Performed by: SURGERY

## 2024-08-30 PROCEDURE — 2709999900 HC NON-CHARGEABLE SUPPLY: Performed by: SURGERY

## 2024-08-30 PROCEDURE — 2580000003 HC RX 258: Performed by: INTERNAL MEDICINE

## 2024-08-30 PROCEDURE — 88304 TISSUE EXAM BY PATHOLOGIST: CPT

## 2024-08-30 PROCEDURE — 3600000014 HC SURGERY LEVEL 4 ADDTL 15MIN: Performed by: SURGERY

## 2024-08-30 PROCEDURE — 6370000000 HC RX 637 (ALT 250 FOR IP): Performed by: NURSE PRACTITIONER

## 2024-08-30 PROCEDURE — 2060000000 HC ICU INTERMEDIATE R&B

## 2024-08-30 PROCEDURE — 6360000004 HC RX CONTRAST MEDICATION: Performed by: SURGERY

## 2024-08-30 PROCEDURE — C1729 CATH, DRAINAGE: HCPCS | Performed by: SURGERY

## 2024-08-30 PROCEDURE — 3600000004 HC SURGERY LEVEL 4 BASE: Performed by: SURGERY

## 2024-08-30 PROCEDURE — 2500000003 HC RX 250 WO HCPCS

## 2024-08-30 RX ORDER — HYDROMORPHONE HYDROCHLORIDE 1 MG/ML
0.5 INJECTION, SOLUTION INTRAMUSCULAR; INTRAVENOUS; SUBCUTANEOUS EVERY 5 MIN PRN
Status: DISCONTINUED | OUTPATIENT
Start: 2024-08-30 | End: 2024-08-30

## 2024-08-30 RX ORDER — ONDANSETRON 2 MG/ML
INJECTION INTRAMUSCULAR; INTRAVENOUS PRN
Status: DISCONTINUED | OUTPATIENT
Start: 2024-08-30 | End: 2024-08-30 | Stop reason: SDUPTHER

## 2024-08-30 RX ORDER — NALOXONE HYDROCHLORIDE 0.4 MG/ML
INJECTION, SOLUTION INTRAMUSCULAR; INTRAVENOUS; SUBCUTANEOUS PRN
Status: DISCONTINUED | OUTPATIENT
Start: 2024-08-30 | End: 2024-08-30

## 2024-08-30 RX ORDER — LABETALOL HYDROCHLORIDE 5 MG/ML
10 INJECTION, SOLUTION INTRAVENOUS
Status: DISCONTINUED | OUTPATIENT
Start: 2024-08-30 | End: 2024-08-30

## 2024-08-30 RX ORDER — HYDROMORPHONE HYDROCHLORIDE 1 MG/ML
0.5 INJECTION, SOLUTION INTRAMUSCULAR; INTRAVENOUS; SUBCUTANEOUS
Status: DISCONTINUED | OUTPATIENT
Start: 2024-08-30 | End: 2024-08-31 | Stop reason: HOSPADM

## 2024-08-30 RX ORDER — DEXAMETHASONE SODIUM PHOSPHATE 4 MG/ML
INJECTION, SOLUTION INTRA-ARTICULAR; INTRALESIONAL; INTRAMUSCULAR; INTRAVENOUS; SOFT TISSUE PRN
Status: DISCONTINUED | OUTPATIENT
Start: 2024-08-30 | End: 2024-08-30 | Stop reason: SDUPTHER

## 2024-08-30 RX ORDER — DEXTROSE MONOHYDRATE AND SODIUM CHLORIDE 5; .45 G/100ML; G/100ML
INJECTION, SOLUTION INTRAVENOUS CONTINUOUS
Status: DISCONTINUED | OUTPATIENT
Start: 2024-08-30 | End: 2024-08-30

## 2024-08-30 RX ORDER — HYDROCHLOROTHIAZIDE 12.5 MG/1
12.5 TABLET ORAL DAILY
COMMUNITY

## 2024-08-30 RX ORDER — SODIUM CHLORIDE, SODIUM LACTATE, POTASSIUM CHLORIDE, CALCIUM CHLORIDE 600; 310; 30; 20 MG/100ML; MG/100ML; MG/100ML; MG/100ML
INJECTION, SOLUTION INTRAVENOUS CONTINUOUS PRN
Status: DISCONTINUED | OUTPATIENT
Start: 2024-08-30 | End: 2024-08-30 | Stop reason: SDUPTHER

## 2024-08-30 RX ORDER — SODIUM CHLORIDE, SODIUM LACTATE, POTASSIUM CHLORIDE, CALCIUM CHLORIDE 600; 310; 30; 20 MG/100ML; MG/100ML; MG/100ML; MG/100ML
INJECTION, SOLUTION INTRAVENOUS CONTINUOUS
Status: DISCONTINUED | OUTPATIENT
Start: 2024-08-30 | End: 2024-08-31 | Stop reason: HOSPADM

## 2024-08-30 RX ORDER — ROCURONIUM BROMIDE 10 MG/ML
INJECTION, SOLUTION INTRAVENOUS PRN
Status: DISCONTINUED | OUTPATIENT
Start: 2024-08-30 | End: 2024-08-30 | Stop reason: SDUPTHER

## 2024-08-30 RX ORDER — FENTANYL CITRATE 50 UG/ML
INJECTION, SOLUTION INTRAMUSCULAR; INTRAVENOUS PRN
Status: DISCONTINUED | OUTPATIENT
Start: 2024-08-30 | End: 2024-08-30 | Stop reason: SDUPTHER

## 2024-08-30 RX ORDER — DIPHENHYDRAMINE HYDROCHLORIDE 50 MG/ML
12.5 INJECTION INTRAMUSCULAR; INTRAVENOUS
Status: DISCONTINUED | OUTPATIENT
Start: 2024-08-30 | End: 2024-08-30

## 2024-08-30 RX ORDER — PROCHLORPERAZINE EDISYLATE 5 MG/ML
5 INJECTION INTRAMUSCULAR; INTRAVENOUS
Status: DISCONTINUED | OUTPATIENT
Start: 2024-08-30 | End: 2024-08-30

## 2024-08-30 RX ORDER — GLYCOPYRROLATE 0.2 MG/ML
INJECTION INTRAMUSCULAR; INTRAVENOUS PRN
Status: DISCONTINUED | OUTPATIENT
Start: 2024-08-30 | End: 2024-08-30 | Stop reason: SDUPTHER

## 2024-08-30 RX ORDER — BUPIVACAINE HYDROCHLORIDE 5 MG/ML
INJECTION, SOLUTION EPIDURAL; INTRACAUDAL PRN
Status: DISCONTINUED | OUTPATIENT
Start: 2024-08-30 | End: 2024-08-30 | Stop reason: ALTCHOICE

## 2024-08-30 RX ORDER — EPHEDRINE SULFATE/0.9% NACL/PF 25 MG/5 ML
SYRINGE (ML) INTRAVENOUS PRN
Status: DISCONTINUED | OUTPATIENT
Start: 2024-08-30 | End: 2024-08-30 | Stop reason: SDUPTHER

## 2024-08-30 RX ORDER — ONDANSETRON 2 MG/ML
4 INJECTION INTRAMUSCULAR; INTRAVENOUS
Status: DISCONTINUED | OUTPATIENT
Start: 2024-08-30 | End: 2024-08-30

## 2024-08-30 RX ORDER — LIDOCAINE HYDROCHLORIDE 20 MG/ML
INJECTION, SOLUTION EPIDURAL; INFILTRATION; INTRACAUDAL; PERINEURAL PRN
Status: DISCONTINUED | OUTPATIENT
Start: 2024-08-30 | End: 2024-08-30 | Stop reason: SDUPTHER

## 2024-08-30 RX ORDER — SODIUM CHLORIDE 9 MG/ML
INJECTION, SOLUTION INTRAVENOUS PRN
Status: DISCONTINUED | OUTPATIENT
Start: 2024-08-30 | End: 2024-08-30

## 2024-08-30 RX ORDER — MIDAZOLAM HYDROCHLORIDE 5 MG/5ML
2 INJECTION, SOLUTION INTRAMUSCULAR; INTRAVENOUS
Status: DISCONTINUED | OUTPATIENT
Start: 2024-08-30 | End: 2024-08-30

## 2024-08-30 RX ORDER — SODIUM CHLORIDE 0.9 % (FLUSH) 0.9 %
5-40 SYRINGE (ML) INJECTION PRN
Status: DISCONTINUED | OUTPATIENT
Start: 2024-08-30 | End: 2024-08-30

## 2024-08-30 RX ORDER — LIDOCAINE 4 G/G
1 PATCH TOPICAL DAILY PRN
Status: DISCONTINUED | OUTPATIENT
Start: 2024-08-30 | End: 2024-08-31 | Stop reason: HOSPADM

## 2024-08-30 RX ORDER — MEPERIDINE HYDROCHLORIDE 25 MG/ML
12.5 INJECTION INTRAMUSCULAR; INTRAVENOUS; SUBCUTANEOUS EVERY 5 MIN PRN
Status: DISCONTINUED | OUTPATIENT
Start: 2024-08-30 | End: 2024-08-30

## 2024-08-30 RX ORDER — OXYCODONE HYDROCHLORIDE 5 MG/1
5 TABLET ORAL
Status: DISCONTINUED | OUTPATIENT
Start: 2024-08-30 | End: 2024-08-30

## 2024-08-30 RX ORDER — SODIUM CHLORIDE 0.9 % (FLUSH) 0.9 %
5-40 SYRINGE (ML) INJECTION EVERY 12 HOURS SCHEDULED
Status: DISCONTINUED | OUTPATIENT
Start: 2024-08-30 | End: 2024-08-30

## 2024-08-30 RX ORDER — FENTANYL CITRATE 50 UG/ML
25 INJECTION, SOLUTION INTRAMUSCULAR; INTRAVENOUS EVERY 5 MIN PRN
Status: DISCONTINUED | OUTPATIENT
Start: 2024-08-30 | End: 2024-08-30

## 2024-08-30 RX ORDER — OXYCODONE HYDROCHLORIDE 5 MG/1
5 TABLET ORAL EVERY 4 HOURS PRN
Status: DISCONTINUED | OUTPATIENT
Start: 2024-08-30 | End: 2024-08-31 | Stop reason: HOSPADM

## 2024-08-30 RX ORDER — SUCCINYLCHOLINE CHLORIDE 20 MG/ML
INJECTION INTRAMUSCULAR; INTRAVENOUS PRN
Status: DISCONTINUED | OUTPATIENT
Start: 2024-08-30 | End: 2024-08-30 | Stop reason: SDUPTHER

## 2024-08-30 RX ORDER — IOPAMIDOL 755 MG/ML
INJECTION, SOLUTION INTRAVASCULAR PRN
Status: DISCONTINUED | OUTPATIENT
Start: 2024-08-30 | End: 2024-08-30 | Stop reason: ALTCHOICE

## 2024-08-30 RX ADMIN — PHENYLEPHRINE HYDROCHLORIDE 40 MCG: 10 INJECTION INTRAVENOUS at 13:30

## 2024-08-30 RX ADMIN — OXYCODONE HYDROCHLORIDE 5 MG: 5 TABLET ORAL at 18:35

## 2024-08-30 RX ADMIN — LIDOCAINE HYDROCHLORIDE 100 MG: 20 INJECTION, SOLUTION EPIDURAL; INFILTRATION; INTRACAUDAL; PERINEURAL at 12:53

## 2024-08-30 RX ADMIN — Medication 2000 UNITS: at 08:22

## 2024-08-30 RX ADMIN — ONDANSETRON HYDROCHLORIDE 4 MG: 2 INJECTION, SOLUTION INTRAMUSCULAR; INTRAVENOUS at 13:55

## 2024-08-30 RX ADMIN — SODIUM CHLORIDE, PRESERVATIVE FREE 10 ML: 5 INJECTION INTRAVENOUS at 22:34

## 2024-08-30 RX ADMIN — METOPROLOL SUCCINATE 100 MG: 50 TABLET, EXTENDED RELEASE ORAL at 08:24

## 2024-08-30 RX ADMIN — EPHEDRINE SULFATE 5 MG: 5 INJECTION INTRAVENOUS at 13:38

## 2024-08-30 RX ADMIN — ROCURONIUM BROMIDE 20 MG: 10 INJECTION INTRAVENOUS at 13:05

## 2024-08-30 RX ADMIN — SODIUM CHLORIDE, PRESERVATIVE FREE 10 ML: 5 INJECTION INTRAVENOUS at 08:28

## 2024-08-30 RX ADMIN — ROCURONIUM BROMIDE 10 MG: 10 INJECTION INTRAVENOUS at 12:53

## 2024-08-30 RX ADMIN — PHENYLEPHRINE HYDROCHLORIDE 40 MCG: 10 INJECTION INTRAVENOUS at 13:40

## 2024-08-30 RX ADMIN — PHENYLEPHRINE HYDROCHLORIDE 80 MCG: 10 INJECTION INTRAVENOUS at 13:55

## 2024-08-30 RX ADMIN — PROPOFOL 150 MG: 10 INJECTION, EMULSION INTRAVENOUS at 12:53

## 2024-08-30 RX ADMIN — SUGAMMADEX 400 MG: 100 INJECTION, SOLUTION INTRAVENOUS at 13:55

## 2024-08-30 RX ADMIN — SUCCINYLCHOLINE CHLORIDE 140 MG: 20 INJECTION, SOLUTION INTRAMUSCULAR; INTRAVENOUS at 12:53

## 2024-08-30 RX ADMIN — FENTANYL CITRATE 50 MCG: 50 INJECTION, SOLUTION INTRAMUSCULAR; INTRAVENOUS at 13:41

## 2024-08-30 RX ADMIN — AMLODIPINE BESYLATE 5 MG: 5 TABLET ORAL at 08:24

## 2024-08-30 RX ADMIN — GLYCOPYRROLATE 0.1 MG: 0.2 INJECTION, SOLUTION INTRAMUSCULAR; INTRAVENOUS at 13:32

## 2024-08-30 RX ADMIN — GLYCOPYRROLATE 0.1 MG: 0.2 INJECTION, SOLUTION INTRAMUSCULAR; INTRAVENOUS at 13:38

## 2024-08-30 RX ADMIN — PIPERACILLIN AND TAZOBACTAM 3375 MG: 3; .375 INJECTION, POWDER, LYOPHILIZED, FOR SOLUTION INTRAVENOUS at 20:28

## 2024-08-30 RX ADMIN — FOLIC ACID 1 MG: 1 TABLET ORAL at 08:21

## 2024-08-30 RX ADMIN — PHENYLEPHRINE HYDROCHLORIDE 40 MCG: 10 INJECTION INTRAVENOUS at 13:32

## 2024-08-30 RX ADMIN — PIPERACILLIN AND TAZOBACTAM 3375 MG: 3; .375 INJECTION, POWDER, LYOPHILIZED, FOR SOLUTION INTRAVENOUS at 11:05

## 2024-08-30 RX ADMIN — ROCURONIUM BROMIDE 10 MG: 10 INJECTION INTRAVENOUS at 13:40

## 2024-08-30 RX ADMIN — FENTANYL CITRATE 50 MCG: 50 INJECTION, SOLUTION INTRAMUSCULAR; INTRAVENOUS at 12:45

## 2024-08-30 RX ADMIN — LEVETIRACETAM 500 MG: 100 INJECTION INTRAVENOUS at 22:33

## 2024-08-30 RX ADMIN — PIPERACILLIN AND TAZOBACTAM 3375 MG: 3; .375 INJECTION, POWDER, LYOPHILIZED, FOR SOLUTION INTRAVENOUS at 04:08

## 2024-08-30 RX ADMIN — LORAZEPAM 1 MG: 1 TABLET ORAL at 22:34

## 2024-08-30 RX ADMIN — LEVETIRACETAM 500 MG: 100 INJECTION INTRAVENOUS at 10:59

## 2024-08-30 RX ADMIN — DEXAMETHASONE SODIUM PHOSPHATE 8 MG: 4 INJECTION, SOLUTION INTRAMUSCULAR; INTRAVENOUS at 13:11

## 2024-08-30 RX ADMIN — SODIUM CHLORIDE, POTASSIUM CHLORIDE, SODIUM LACTATE AND CALCIUM CHLORIDE: 600; 310; 30; 20 INJECTION, SOLUTION INTRAVENOUS at 12:42

## 2024-08-30 RX ADMIN — EPHEDRINE SULFATE 5 MG: 5 INJECTION INTRAVENOUS at 13:40

## 2024-08-30 RX ADMIN — SODIUM CHLORIDE, POTASSIUM CHLORIDE, SODIUM LACTATE AND CALCIUM CHLORIDE: 600; 310; 30; 20 INJECTION, SOLUTION INTRAVENOUS at 12:31

## 2024-08-30 ASSESSMENT — PAIN SCALES - GENERAL
PAINLEVEL_OUTOF10: 7
PAINLEVEL_OUTOF10: 9
PAINLEVEL_OUTOF10: 8
PAINLEVEL_OUTOF10: 6

## 2024-08-30 ASSESSMENT — PAIN DESCRIPTION - DESCRIPTORS
DESCRIPTORS: ACHING;DISCOMFORT
DESCRIPTORS: ACHING
DESCRIPTORS: DISCOMFORT;ACHING
DESCRIPTORS: ACHING

## 2024-08-30 ASSESSMENT — PAIN DESCRIPTION - LOCATION
LOCATION: HIP
LOCATION: HIP
LOCATION: ABDOMEN
LOCATION: HIP

## 2024-08-30 ASSESSMENT — PAIN DESCRIPTION - ORIENTATION
ORIENTATION: RIGHT
ORIENTATION: RIGHT
ORIENTATION: ANTERIOR
ORIENTATION: RIGHT

## 2024-08-30 NOTE — PROGRESS NOTES
Plan for lap nicolle with intraoperative cholangiogram tomorrow.  Discussed with patient's family.  Will discuss with her in the morning.

## 2024-08-30 NOTE — CONSULTS
Encounter Date: 8/28/2024  Surgery Consult    Assessment:   Cholecystolithiasis, status post ERCP by Dr. Amos yesterday with removal of common bile duct stones for cholangitis.    Body mass index is 32.27 kg/m².    Comorbid subdural hematoma from a fall after syncopal episode secondary to the cholangitis sepsis.    S/p hysterectomy.     Plan:   We discussed cholecystectomy with intraoperative cholangiogram.  Discussed alternatives, risks and benefits of surgery.  Risks include but are not limited to infection, hematoma, bleeding, bile duct or bowel injury, recurrence or persistence of symptoms, conversion to an open operation, retained CBD stones, possible ALAINA drain, and the risks of general anesthetic.  All questions were answered to the patient's satisfaction.     Continue antibiotics presently.    HPI:   Patient is a 74 y.o. female who is seen in consultation at the request of ARIELLE Christiansen MD.   Notes that last weekend she became quite ill with fever and chills and ended up having a fall.  She went to [facility] and was initially treated there.  She was found to have a subdural hematoma.  She is not having any localizing signs.  She was also found to have cholangitis and yesterday underwent ERCP by Dr. Amos to remove the common duct stones and drain the common bile duct.  She feels much better overall but is still sore from her fall.    Past Medical History:   Diagnosis Date    Acute lower UTI 1/18/2018    Age-related osteoporosis without current pathological fracture 11/9/2022    Annual physical exam 1/18/2018    Arrhythmia 2000    TACHYCARDIA RHYTHM x1 episode 2000    Arthritis     spinal stenosis; previous spine surgery    Cataract 1/18/2018    Chronic insomnia 1/18/2018    Dyslipidemia 1/18/2018    EBT (electron beam tomography) abnormal 1/18/2018    Fibrocystic breast changes 1/18/2018    High cholesterol     HTN (hypertension) 1/18/2018    Inflammatory bowel disease 1/18/2018     Nephrolithiasis 1/18/2018    On statin therapy 1/18/2018    Post hysterectomy menopause 1/18/2018    RA (rheumatoid arthritis) (HCC) 10/8/2020    Sciatica 1/18/2018    Visit for screening mammogram 1/18/2018      Past Surgical History:   Procedure Laterality Date    CATARACT REMOVAL      COLONOSCOPY N/A 1/10/2022    COLONOSCOPY  needs rapid covid performed by Sumanth Amos MD at [facility] ENDOSCOPY    GYN      hysterectomy 1980    NEUROLOGICAL SURGERY  6/11    Back; 2 buldging disc fused, bone removed from hip    TONSILLECTOMY      UROLOGICAL SURGERY  2009    URETERAL STENT     Social History     Tobacco Use    Smoking status: Never    Smokeless tobacco: Never   Substance Use Topics    Alcohol use: Yes     Alcohol/week: 10.0 standard drinks of alcohol     Types: 10 Standard drinks or equivalent per week     Comment: 2-3 beers daily      Family History   Problem Relation Age of Onset    Alcohol Abuse Maternal Grandmother     Heart Disease Maternal Grandmother         MI    Alcohol Abuse Maternal Grandfather     Heart Disease Father         4 VESSEL CABG    Asthma Paternal Grandmother        Current Facility-Administered Medications   Medication Dose Route Frequency    lidocaine PF 1 % injection 1 mL  1 mL IntraDERmal Once PRN    lactated ringers IV soln infusion   IntraVENous Continuous    sodium chloride flush 0.9 % injection 5-40 mL  5-40 mL IntraVENous 2 times per day    sodium chloride flush 0.9 % injection 5-40 mL  5-40 mL IntraVENous PRN    0.9 % sodium chloride infusion   IntraVENous PRN    Vitamin D (CHOLECALCIFEROL) tablet 2,000 Units  2,000 Units Oral QAM    folic acid (FOLVITE) tablet 1 mg  1 mg Oral Daily    sodium chloride flush 0.9 % injection 5-40 mL  5-40 mL IntraVENous 2 times per day    sodium chloride flush 0.9 % injection 5-40 mL  5-40 mL IntraVENous PRN    0.9 % sodium chloride infusion   IntraVENous PRN    ondansetron (ZOFRAN-ODT) disintegrating tablet 4 mg  4 mg Oral Q8H PRN    Or     ondansetron (ZOFRAN) injection 4 mg  4 mg IntraVENous Q6H PRN    polyethylene glycol (GLYCOLAX) packet 17 g  17 g Oral Daily PRN    acetaminophen (TYLENOL) tablet 650 mg  650 mg Oral Q6H PRN    Or    acetaminophen (TYLENOL) suppository 650 mg  650 mg Rectal Q6H PRN    piperacillin-tazobactam (ZOSYN) 3,375 mg in sodium chloride 0.9 % 50 mL IVPB (mini-bag)  3,375 mg IntraVENous Q8H    levETIRAcetam (KEPPRA) injection 500 mg  500 mg IntraVENous Q12H    LORazepam (ATIVAN) tablet 1 mg  1 mg Oral Q12H PRN    amLODIPine (NORVASC) tablet 5 mg  5 mg Oral Daily    metoprolol succinate (TOPROL XL) extended release tablet 100 mg  100 mg Oral Daily       Allergies:  Allergies   Allergen Reactions    Levofloxacin Hives        Review of Systems:  10 systems reviewed.  See scanned sheet in \"Media\" section.  See HPI for pertinent positives and negatives.      Objective:   BP (!) 136/107   Pulse 63   Temp 97.9 °F (36.6 °C) (Oral)   Resp 16   Ht 1.626 m (5' 4\")   Wt 85.3 kg (188 lb)   SpO2 99%   BMI 32.27 kg/m²   General appearance  Alert, cooperative, no distress, appears stated age   HEENT  Anicteric   Neck Supple       Lungs   CTAB   Heart  Regular rate and rhythm.     Abdomen   Soft. Bowel sounds normal. No palpable masses.  Abdomen is not tender in the right upper quadrant.   Extremities No CCE.   Pulses 2+ right radial   Skin Skin color, texture, turgor normal.        Neurologic Without overt sensory or motor deficit     Imaging and Lab Review:   images and reports reviewed    Signed By: Luther Cassidy MD     August 30, 2024

## 2024-08-30 NOTE — PERIOP NOTE
TRANSFER - IN REPORT:    Verbal report received from Nikolai LAFLEUR on Iris De La Fuente  being received from 2313 for ordered procedure      Report consisted of patient’s Situation, Background, Assessment and   Recommendations(SBAR).     Information from the following report(s) Nurse Handoff Report, ED Encounter Summary, ED SBAR, Adult Overview, Surgery Report, Intake/Output, MAR, and Recent Results was reviewed with the receiving nurse.    Opportunity for questions and clarification was provided.      Assessment completed upon patient’s arrival to unit and care assume

## 2024-08-30 NOTE — PLAN OF CARE
Problem: Discharge Planning  Goal: Discharge to home or other facility with appropriate resources  8/29/2024 2153 by Lelo Santillan RN  Outcome: Progressing  8/29/2024 1121 by Citlali Mchugh, RN  Outcome: Progressing  Flowsheets (Taken 8/29/2024 0800)  Discharge to home or other facility with appropriate resources:   Identify barriers to discharge with patient and caregiver   Arrange for needed discharge resources and transportation as appropriate   Identify discharge learning needs (meds, wound care, etc)     Problem: Pain  Goal: Verbalizes/displays adequate comfort level or baseline comfort level  8/29/2024 2153 by Lelo Santillan RN  Outcome: Progressing  8/29/2024 1121 by Citlali Mchugh, RN  Outcome: Progressing     Problem: Safety - Adult  Goal: Free from fall injury  8/29/2024 1121 by Citlali Mchugh, RN  Outcome: Progressing

## 2024-08-30 NOTE — ANESTHESIA PRE PROCEDURE
Department of Anesthesiology  Preprocedure Note       Name:  Iris De La Fuente   Age:  74 y.o.  :  1950                                          MRN:  [id number]         Date:  2024      Surgeon: Surgeon(s):  Luther Cassidy MD    Procedure: Procedure(s):  CHOLECYSTECTOMY LAPAROSCOPIC WITH GRAMS    Medications prior to admission:   Prior to Admission medications    Medication Sig Start Date End Date Taking? Authorizing Provider   hydroCHLOROthiazide 12.5 MG tablet Take 1 tablet by mouth daily   Yes ProviderVielka MD   LORazepam (ATIVAN) 2 MG tablet Take 1 tablet by mouth every 6 hours as needed.   Yes Provider, MD Vielka   TURMERIC PO Take by mouth in the morning and at bedtime   Yes ProviderVielka MD   rosuvastatin (CRESTOR) 10 MG tablet TAKE 1 TABLET BY MOUTH ONE TIME DAILY 11/15/23  Yes ARIELLE Christiansen MD   Cetirizine HCl 10 MG CAPS Take by mouth   Yes Automatic Reconciliation, Ar   Cholecalciferol 50 MCG (2000) TABS Take 1 tablet by mouth every morning   Yes Automatic Reconciliation, Ar   folic acid (FOLVITE) 1 MG tablet Take by mouth daily   Yes Automatic Reconciliation, Ar   methotrexate (RHEUMATREX) 2.5 MG chemo tablet Take 9 tablets by mouth once a week Every Monday night 18  Yes Automatic Reconciliation, Ar   metoprolol succinate (TOPROL XL) 50 MG extended release tablet Take 2 tablets by mouth at bedtime 22  Yes Automatic Reconciliation, Ar       Current medications:    Current Facility-Administered Medications   Medication Dose Route Frequency Provider Last Rate Last Admin   • lidocaine PF 1 % injection 1 mL  1 mL IntraDERmal Once PRN Caryl Pierre MD       • lactated ringers IV soln infusion   IntraVENous Continuous Caryl Pierre MD 50 mL/hr at 24 New Bag at 24   • sodium chloride flush 0.9 % injection 5-40 mL  5-40 mL IntraVENous 2 times per day Caryl Pierre MD   10 mL at 24   • sodium chloride flush 0.9 % injection  Active Problem List   Diagnosis Code   • Rheumatoid arthritis involving multiple sites with positive rheumatoid factor ([facility]) M05.79   • Essential hypertension I10   • Sciatica M54.30   • Inflammatory bowel disease K52.9   • Cataract H26.9   • Fibrocystic breast changes N60.19   • On statin therapy Z79.899   • Dyslipidemia E78.5   • Chronic insomnia F51.04   • EBT (electron beam tomography) abnormal R94.39   • Nephrolithiasis N20.0   • Spinal stenosis, lumbar region, with neurogenic claudication M48.062   • Post hysterectomy menopause E89.40, Z90.710   • Acute lower UTI N39.0   • Age-related osteoporosis without current pathological fracture M81.0   • SVT (supraventricular tachycardia) ([facility]) I47.10   • IGT (impaired glucose tolerance) R73.02   • Pulmonary embolism and infarction ([facility]) I26.99       Past Medical History:        Diagnosis Date   • Acute lower UTI 1/18/2018   • Age-related osteoporosis without current pathological fracture 11/9/2022   • Annual physical exam 1/18/2018   • Arrhythmia 2000    TACHYCARDIA RHYTHM x1 episode 2000   • Arthritis     spinal stenosis; previous spine surgery   • Cataract 1/18/2018   • Chronic insomnia 1/18/2018   • Dyslipidemia 1/18/2018   • EBT (electron beam tomography) abnormal 1/18/2018   • Fibrocystic breast changes 1/18/2018   • High cholesterol    • HTN (hypertension) 1/18/2018   • Inflammatory bowel disease 1/18/2018   • Nephrolithiasis 1/18/2018   • On statin therapy 1/18/2018   • Post hysterectomy menopause 1/18/2018   • RA (rheumatoid arthritis) ([facility]) 10/8/2020   • Sciatica 1/18/2018   • Visit for screening mammogram 1/18/2018       Past Surgical History:        Procedure Laterality Date   • CATARACT REMOVAL     • COLONOSCOPY N/A 1/10/2022    COLONOSCOPY  needs rapid covid performed by Sumanth Amos MD at [facility] ENDOSCOPY   • GYN      hysterectomy 1980   • NEUROLOGICAL SURGERY  6/11    Back; 2 buldging disc fused, bone removed from hip   • TONSILLECTOMY     •

## 2024-08-30 NOTE — OP NOTE
DATE OF PROCEDURE:  8/30/2024     PREOPERATIVE DIAGNOSIS:   Cholangitis status post ERCP with common bile duct stone removals, cholecystolithiasis.    POSTOPERATIVE DIAGNOSIS:   Same    OPERATIVE PROCEDURE:  Laparoscopic cholecystectomy with cholangiogram, interpretation of cholangiogram, images saved in PACS (CPT [id number])    SURGEON:  Luther Cassidy MD    ANESTHESIA:  General endotracheal.    EBL: minimal    DRAINS: 19 Fr. Round Hemoduct drain    SPECIMENS:   ID Type Source Tests Collected by Time Destination   1 : gallbladder Tissue Gallbladder SURGICAL PATHOLOGY Luther Cassidy MD 8/30/2024 1342         FINDINGS: Chronic inflammation of the gallbladder, gallstones.  Contrast emptied into the duodenum without obvious filling defect.    INDICATIONS: Admitted with cholangitis.  Underwent ERCP with stone extraction yesterday.    DESCRIPTION OF PROCEDURE:  After obtaining informed consent, the patient was taken to the operating room and placed supine on the operating table.  An operative time-out was performed, and general endotracheal anesthesia was induced.  Preoperative antibiotics were administered, and the abdomen was prepped and draped in the usual sterile fashion.  The abdomen was then entered just above the umbilicus using a 5-mm optical trocar.  The abdomen was the insufflated without incident and was visually explored.  There was no other visible pathology noted.  Two right upper quadrant 5-mm ports were placed under direct vision, followed by a 12-mm port in the subxiphoid position.  Local anesthetic was infiltrated at the port sites prior to placement.    The gallbladder fundus was then grasped and retracted cephalad over the liver.  The triangle of Calot was exposed, and the cystic duct and artery were skeletonized using a combination of blunt dissection with a Maryland dissector and hook electrocautery.  The cystic artery was then divided between clips with 2 clips left on the patient side  and the third on a branch.  The cystic duct was then traced from the gallbladder infundibulum into its insertion into the portal triad.  The cystic duct was swept with a grasper up into the gallbladder.  A clip was placed on the gallbladder infundibulum and an incision in the cystic duct adjacent to the gallbladder infundibulum was made with florencia and the cholangiogram catheter was inserted and the cholangiogram was performed with the above noted findings.  The cystic duct was then divided between clips directly adjacent to the gallbladder infundibulum, with 3 clips left on the patient's side.  The gallbladder was then removed from the liver bed using hook electrocautery.  It was removed from the abdominal cavity using a bag through the subxiphoid incision.  The liver bed was inspected for hemostasis, and excellent hemostasis was achieved with minimal electrocautery.  The clips on the cystic duct and artery were again visualized and were intact.  The area below and lateral to the liver was suction irrigated until clear.  A 19 Fr round Hemoduct drain was left below the liver and made to leave the peritoneum via the lateral-most insicion. It was sutured to the skin with a Nylon suture.  At the end of the procedure, a drain sponge was placed around the ALAINA and secured with tape.     The right upper quadrant ports were removed under direct vision and both were hemostatic.  The abdomen was then desufflated through the subxiphoid port under direct vision via the umbilical port.  These ports were subsequently removed and the fascial defect at the 12-mm incision was closed with an interrupted 0 Vicryl suture.  The incisions were irrigated with sterile saline and made hemostatic with minimal electrocautery.  They were closed with buried interrupted 4-0 Monocryl sutures, and dressed with Dermabond.   The patient was recovered from general anesthesia and taken to the recovery area in satisfactory condition.   All instrument,  sponge, and needle counts were reported as correct.    Luther Cassidy MD

## 2024-08-30 NOTE — ANESTHESIA POSTPROCEDURE EVALUATION
Department of Anesthesiology  Postprocedure Note    Patient: Iris De La Fuente  MRN: [id number]  YOB: 1950  Date of evaluation: 8/30/2024    Procedure Summary       Date: 08/30/24 Room / Location: [facility] MA[facility] OR M4 / [facility] MA[facility] OR    Anesthesia Start: 1242 Anesthesia Stop: 1415    Procedure: CHOLECYSTECTOMY LAPAROSCOPIC WITH GRAMS (Abdomen) Diagnosis:       Cholecystitis      (Cholecystitis [K81.9])    Providers: Luther Cassidy MD Responsible Provider: Bari Sawant MD    Anesthesia Type: General ASA Status: 3            Anesthesia Type: General    Susy Phase I: Susy Score: 8    Susy Phase II:      Anesthesia Post Evaluation  Post-Anesthesia Evaluation and Assessment    Patient: Iris De La Fuente MRN: [id number]  SSN: [id number]    YOB: 1950  Age: 74 y.o.  Sex: female      I have evaluated the patient and they are stable and ready for discharge from the PACU.     Cardiovascular Function/Vital Signs  Visit Vitals  BP (!) 144/96   Pulse 73   Temp 97.6 °F (36.4 °C) (Oral)   Resp 19   Ht 1.626 m (5' 4\")   Wt 85.3 kg (188 lb)   SpO2 98%   BMI 32.27 kg/m²       Patient is status post General anesthesia for Procedure(s):  CHOLECYSTECTOMY LAPAROSCOPIC WITH GRAMS.    Nausea/Vomiting: None    Postoperative hydration reviewed and adequate.    Pain:      Managed    Neurological Status:       At baseline    Mental Status, Level of Consciousness: Arousable    Pulmonary Status:       Adequate oxygenation and airway patent    Complications related to anesthesia: None    Post-anesthesia assessment completed. No concerns    Signed By: Bari Sawant MD     August 30, 2024                No notable events documented.

## 2024-08-30 NOTE — PROGRESS NOTES
Hospitalist Progress Note    NAME:   Iris De La Fuente   : 1950   MRN: [id number]     Date/Time: 2024 8:06 AM  Patient PCP: ARIELLE Christiansen MD    Estimated discharge date: 2024  Barriers: Needs cholecystectomy, GI clearance/surgery clearance    Assessment / Plan:  Fever and chills  Likely ascending cholangitis  Gram-negative bacteremia-resolved from outside facility  Choledocholithiasis   Patient admitted to medical stepdown  Ultrasound abdomen done on 2022-hepatic steatosis  Liver function panel:  Alkaline phosphatase 166>> 153> 151  >> 128>> 115  >> 112>> 60  Bilirubin 4.1>> 3.0>> 2.2  Bilirubin direct 2.4>> 1.6> 0.9  Repeat blood cultures negative till now  Patient is status post ERCP with removal of 2 stones and biliary sludge.  Surgery consulted-for cholecystectomy  Continue Zosyn for now     Bilateral segmental PE  CT chest showed  There are subsegmental emboli noted within left upper and lower lobe  pulmonary arterial distribution. 2.   Age indeterminate compression fracture involving the superior endplate of T2, with less than 25% loss of height. Age indeterminate compression fracture  of T3, with approximately 25% loss of height.   Ultrasound Doppler negative for DVT lower extremity  Discussed with hematology, contraindication for anticoagulation as patient is at high risk of bleeding due to recent subdural hematoma  Patient will not be started on any anticoagulation at this moment     Recent subdural hematoma status postconcussion  CT head repeated here-3 mm left posterior-parafalcine hyperattenuating focus consistent with reported history of subdural hematoma at outside facility.  We will continue to monitor  Continue with Keppra for few more days, recommended by neurosurgery at [facility]  Neurosurgery consulted here-advised for new CT scan.  Patient's mentation remains at baseline.     Hyponatremia  Hypokalemia  Hypomagnesemia  Outside lab reviewed,  1355 (!) 130/58 -- -- 81 12 99 %   08/29/24 1350 98/79 -- -- 81 14 98 %   08/29/24 1345 118/61 -- -- -- -- --   08/29/24 1340 (!) 125/58 -- -- 80 22 --   08/29/24 1338 (!) 108/98 98.5 °F (36.9 °C) Oral 86 25 98 %   08/29/24 1237 (!) 159/87 98.3 °F (36.8 °C) Oral 81 17 95 %   08/29/24 1156 -- -- -- -- -- 91 %         Intake/Output Summary (Last 24 hours) at 8/30/2024 0806  Last data filed at 8/30/2024 0506  Gross per 24 hour   Intake 810 ml   Output 6400 ml   Net -5590 ml        I had a face to face encounter and independently examined this patient on 8/30/2024, as outlined below:  PHYSICAL EXAM:  General: Alert, cooperative, and oxygen by nasal cannula  EENT:  EOMI. Anicteric sclerae.  Resp:  CTA bilaterally, no wheezing or rales.  No accessory muscle use  CV:  Regular  rhythm,  No edema  GI:  Soft, Non distended, Non tender.  +Bowel sounds  Neurologic:  Alert and oriented X 3, normal speech  Back exam: Mildly tender to palpation on the right sacral area  Psych:   Good insight. Not anxious nor agitated  Skin:  No rashes.  No jaundice.  Ecchymosis in the right back and hip region  Chan present    Reviewed most current lab test results and cultures  YES  Reviewed most current radiology test results   YES  Review and summation of old records today    NO  Reviewed patient's current orders and MAR    YES  PMH/SH reviewed - no change compared to H&P    Procedures: see electronic medical records for all procedures/Xrays and details which were not copied into this note but were reviewed prior to creation of Plan.      LABS:  I reviewed today's most current labs and imaging studies.  Pertinent labs include:  Recent Labs     08/28/24  1214 08/29/24  0457 08/30/24  0450   WBC 8.8 6.7 6.3   HGB 7.7* 7.9* 8.9*   HCT 22.3* 22.7* 25.5*   * 174 208     Recent Labs     08/28/24  1214 08/28/24  1816 08/29/24  0457 08/30/24  0450   NA  --  133* 134* 134*   K  --  3.2* 3.7 3.5   CL  --  102 101 102   CO2  --  24 27 23   GLUCOSE   --  117* 126* 138*   BUN  --  5* 5* 6   CREATININE  --  0.59 0.58 0.51*   CALCIUM  --  8.8 8.7 9.1   MG  --  1.8  --  1.9   PHOS  --  2.2*  --   --    BILITOT  --  4.1* 3.0* 2.2*   AST  --  142* 112* 60*   ALT  --  137* 128* 115*   INR 1.0  --   --   --        Signed: Joselito Jenkins MD

## 2024-08-30 NOTE — PROGRESS NOTES
[facility]  [address] [facility] [address] [address]  W: [phone] F: [phone]    Social visit bedside with multiple family members present.  Briefly discussed patient is not a candidate for anticoagulation given subdural hematoma.  Patient is going for laparoscopic cholecystectomy with general surgery today.  Patient verbalized understanding and agreement.  Discussed will arrange outpatient follow-up with Dr. Taveras. OP follow up has been scheduled for 9/24/2024.  Patient verbalized understanding and agreement.

## 2024-08-30 NOTE — PROGRESS NOTES
0700. Bedside shift change report given to Nikolai Nelson (oncoming nurse) by Lelo RN (offgoing nurse). Report included the following information Nurse Handoff Report, Index, Intake/Output, MAR, Recent Results, and Cardiac Rhythm NSR .     1900. End of Shift Note    Bedside shift change report given to Anupama RN (oncoming nurse) by Roman Cabrera RN (offgoing nurse).  Report included the following information SBAR, Kardex, Intake/Output, MAR, Recent Results, and Cardiac Rhythm NSR    Shift worked:  7a-7p     Shift summary and any significant changes:     Patient went for lap choley no issues. Pain later in shift R/T surgery     Concerns for physician to address:       Zone phone for oncoming shift:          Activity:     Number times ambulated in hallways past shift: 0  Number of times OOB to chair past shift: 0    Cardiac:   Cardiac Monitoring: Yes           Access:  Current line(s): PIV     Genitourinary:   Urinary status: caputo    Respiratory:      Chronic home O2 use?: NO  Incentive spirometer at bedside: YES       GI:     Current diet:  ADULT DIET; Regular; Low Fat/Low Chol/High Fiber/2 gm Na  Passing flatus: YES  Tolerating current diet: YES       Pain Management:   Patient states pain is manageable on current regimen: YES    Skin:     Interventions: float heels, increase time out of bed, internal/external urinary devices, and nutritional support    Patient Safety:  Fall Score:    Interventions: bed/chair alarm, gripper socks, and pt to call before getting OOB       Length of Stay:  Expected LOS: 5  Actual LOS: 2      Roman Cabrera, RN

## 2024-08-30 NOTE — PROGRESS NOTES
1900H:Bedside shift change report given to LELO LAFLEUR (oncoming nurse) by MIN LAFLEUR (offgoing nurse). Report included the following information Nurse Handoff Report, Intake/Output, MAR, Recent Results, Med Rec Status, and Cardiac Rhythm SINUS RHYTHM    -Instructed on NPO post midnight.    End of Shift Note    Bedside shift change report given to MED LAFLEUR (oncoming nurse) by Lelo Santillan RN (offgoing nurse).  Report included the following information SBAR, Intake/Output, MAR, Recent Results, Med Rec Status, and Cardiac Rhythm SINUS RHYTHM    Shift worked:  1900H-0730H     Shift summary and any significant changes:     No significant changes overnight; CHG bath given; caputo care done.     Concerns for physician to address:       Zone phone for oncoming shift:          Activity:     Number times ambulated in hallways past shift: 0  Number of times OOB to chair past shift: 0    Cardiac:   Cardiac Monitoring: Yes           Access:  Current line(s): PIV     Genitourinary:   Urinary status: caputo    Respiratory:      Chronic home O2 use?: NO  Incentive spirometer at bedside: N/A       GI:     Current diet:  Diet NPO  Passing flatus: YES  Tolerating current diet: YES       Pain Management:   Patient states pain is manageable on current regimen: YES    Skin:     Interventions: turn team, float heels, PT/OT consult, internal/external urinary devices, and nutritional support    Patient Safety:  Fall Score:    Interventions: bed/chair alarm, assistive device (walker, cane. etc), gripper socks, and pt to call before getting OOB       Length of Stay:  Expected LOS: 2  Actual LOS: 2      Lelo Santillan RN

## 2024-08-30 NOTE — PROGRESS NOTES
GI PROGRESS NOTE  Heidi Mchugh NP  [phone] NP in-hospital cell phone M-F until 4:30  After 5pm or on weekends, please call  for physician on call    NAME:Iris De La Fuente :1950 MRN:[id number]   ATTG: Dr. Jenkins  PCP: ARIELLE Christiansen MD  Date/Time:  2024 10:14 AM   Primary GI: Dr. Amos  Reason for following:   Assessment:   Choledocholithiasis  Acute PE  Subdural hematoma  74 y.o. female who presents for chills and 101 F and tx from OSH  24 S/p recent fall resulting in subdural hematoma, R lung hematoma and T1/T2/T3 compression fractures  OSH CTAP w/ IV: There are 2 stones identified within the common duct, compatible with choledocholithiasis  OSH CT angio w wo IV: There are subsegmental emboli noted within left upper and lower lobe pulmonary arterial distribution. 2. Age indeterminate compression fracture involving the superior endplate of T2, with less than 25% loss of height. Age indeterminate compression fracture of T3, with approximately 25% loss of height.     ERCP : The ampulla was completely within a diverticulum  Numerous stones/sludge in CBD with resulting upstream biliary dilation as seen on OSH CT  Sphincterotomy performed  Balloon sweeps performed with removal of at least two stones and large amount of sludge. The CBD was confirmed clear on further balloon sweeps. A fully inflated 12 mm balloon could easily be swept through the ampulla    Plan:   Appreciate general surgery input - to have lap nicolle today  LFTs improving  Continue abx therapy  Nothing further to add from a GI standpoint.  GI signing-off.  Please call with any questions.  Thank you for this consult.    Plan discussed with Dr. Arevalo  Subjective:   Discussed with RN events overnight. Feeling well following ERCP. No abd pain.       Review of Systems:  Symptom Y/N Comments  Symptom Y/N Comments   Fever/Chills N   Chest Pain N    Cough N   Headaches N    Sputum N   Joint Pain N    SOB/BUSTILLOS N

## 2024-08-30 NOTE — PROGRESS NOTES
Pharmacy Medication Reconciliation     The patient was interviewed regarding current PTA medication list, use and drug allergies; Patient and patient family were present in room and obtained permission from patient to discuss drug regimen with visitor(s) present. The patient was questioned regarding use of any other inhalers, topical products, over the counter medications, herbal medications, vitamin products or ophthalmic/nasal/otic medication use.     Allergy Update: Levofloxacin    Recommendations/Findings:   The following amendments were made to the patient's active medication list on file at [facility]:   1) Additions: Hydrochlorothiazide 12.5 mg daily    2) Deletions: none    3) Changes: metoprolol succinate 50 mg 1.5 tab daily --> 2 tab daily (100 mg daily)      Pertinent Findings: none    Clarified PTA med list with patient. PTA medication list was corrected to the following:     Prior to Admission Medications   Prescriptions Last Dose Informant   Cetirizine HCl 10 MG CAPS Past Week at 0900 Self, Spouse/Significant Other   Sig: Take by mouth   Cholecalciferol 50 MCG (2000 UT) TABS Past Week at 0900 Self, Spouse/Significant Other   Sig: Take 1 tablet by mouth every morning   LORazepam (ATIVAN) 2 MG tablet Past Week at 2100 Self, Spouse/Significant Other   Sig: Take 1 tablet by mouth every 6 hours as needed.   TURMERIC PO Past Week at 0900 Self, Spouse/Significant Other   Sig: Take by mouth in the morning and at bedtime   amLODIPine Benzoate 1 MG/ML SUSP     Si ml Orally Once a day   folic acid (FOLVITE) 1 MG tablet Past Week at 0900 Self, Spouse/Significant Other   Sig: Take by mouth daily   hydroCHLOROthiazide 12.5 MG tablet     Sig: Take 1 tablet by mouth daily   methotrexate (RHEUMATREX) 2.5 MG chemo tablet Past Week at 2100 Self, Spouse/Significant Other   Sig: Take 9 tablets by mouth once a week Every Monday night   metoprolol succinate (TOPROL XL) 50 MG extended release tablet Past Week at 2100 Self,  Spouse/Significant Other   Sig: Take 2 tablets by mouth at bedtime   rosuvastatin (CRESTOR) 10 MG tablet Past Week at 2100 Self, Spouse/Significant Other   Sig: TAKE 1 TABLET BY MOUTH ONE TIME DAILY      Facility-Administered Medications: None        Thank you,  Julien Coe, [facility]

## 2024-08-31 VITALS
HEIGHT: 64 IN | HEART RATE: 58 BPM | SYSTOLIC BLOOD PRESSURE: 178 MMHG | DIASTOLIC BLOOD PRESSURE: 75 MMHG | WEIGHT: 188 LBS | RESPIRATION RATE: 14 BRPM | BODY MASS INDEX: 32.1 KG/M2 | TEMPERATURE: 97.7 F | OXYGEN SATURATION: 94 %

## 2024-08-31 PROCEDURE — 6370000000 HC RX 637 (ALT 250 FOR IP): Performed by: SURGERY

## 2024-08-31 PROCEDURE — 2580000003 HC RX 258: Performed by: SURGERY

## 2024-08-31 PROCEDURE — 6360000002 HC RX W HCPCS: Performed by: SURGERY

## 2024-08-31 RX ORDER — ONDANSETRON 4 MG/1
4 TABLET, FILM COATED ORAL 3 TIMES DAILY PRN
Qty: 15 TABLET | Refills: 0 | Status: SHIPPED | OUTPATIENT
Start: 2024-08-31

## 2024-08-31 RX ORDER — OXYCODONE HYDROCHLORIDE 5 MG/1
5 TABLET ORAL EVERY 6 HOURS PRN
Qty: 12 TABLET | Refills: 0 | Status: SHIPPED | OUTPATIENT
Start: 2024-08-31 | End: 2024-09-03

## 2024-08-31 RX ORDER — CEPHALEXIN 500 MG/1
500 CAPSULE ORAL 4 TIMES DAILY
Qty: 40 CAPSULE | Refills: 0 | Status: SHIPPED | OUTPATIENT
Start: 2024-08-31 | End: 2024-09-10

## 2024-08-31 RX ORDER — AMLODIPINE BESYLATE 5 MG/1
5 TABLET ORAL DAILY
Qty: 30 TABLET | Refills: 1 | Status: SHIPPED | OUTPATIENT
Start: 2024-09-01 | End: 2024-10-31

## 2024-08-31 RX ADMIN — LEVETIRACETAM 500 MG: 100 INJECTION INTRAVENOUS at 11:31

## 2024-08-31 RX ADMIN — SODIUM CHLORIDE, PRESERVATIVE FREE 10 ML: 5 INJECTION INTRAVENOUS at 08:33

## 2024-08-31 RX ADMIN — PIPERACILLIN AND TAZOBACTAM 3375 MG: 3; .375 INJECTION, POWDER, LYOPHILIZED, FOR SOLUTION INTRAVENOUS at 11:35

## 2024-08-31 RX ADMIN — METOPROLOL SUCCINATE 100 MG: 50 TABLET, EXTENDED RELEASE ORAL at 08:29

## 2024-08-31 RX ADMIN — PIPERACILLIN AND TAZOBACTAM 3375 MG: 3; .375 INJECTION, POWDER, LYOPHILIZED, FOR SOLUTION INTRAVENOUS at 04:08

## 2024-08-31 RX ADMIN — AMLODIPINE BESYLATE 5 MG: 5 TABLET ORAL at 08:28

## 2024-08-31 RX ADMIN — FOLIC ACID 1 MG: 1 TABLET ORAL at 08:28

## 2024-08-31 RX ADMIN — Medication 2000 UNITS: at 08:29

## 2024-08-31 NOTE — PROGRESS NOTES
Nursing contacted Nocturnist/cross cover provider via non-urgent messaging system [employer] and notified patient asking for something for muscle pain low back, nurse reported pt had ERCP and lap nicolle but no acute findings reported such as bleeding. No other concerns reported. No acute distress reported. No other information provided by nurse. VSS.     Ordered lidocaine patch topcial daily prn low back. Will defer further evaluation/management to the day shift primary attending care team. Patient denies any further complaints or concerns.     Nursing to notify Hospitalist for further/continued concerns. Will remain available overnight for further concerns if nursing/patient needs. Please note, there are RRT systems in this hospital in place that if nursing has acute or critical patient condition change or concern, this is to help facilitate and notify that patient needs immediate bedside evaluation by a provider.     Non-billable note.

## 2024-08-31 NOTE — CARE COORDINATION
Transition of Care Plan:    RUR: 15% \"medium risk\"  Prior Level of Functioning: Independent with ADL's  Disposition: Home with spouse   If SNF or IPR: Date FOC offered: N/A  Follow up appointments: PCP/Specialists as indicated  DME needed: None  Transportation at discharge: Pt spouse to transport   IM/IMM Medicare/ letter given: 2nd IM given by CM on 8/31  Is patient a  and connected with [facility]? No  Caregiver Contact: Kevin De La Fuente (spouse), [phone]  Discharge Caregiver contacted prior to discharge? Yes, pt contacted  Care Conference needed? Not at this time   Barriers to discharge: None    1222 PM: Chart reviewed. CM acknowledged d/c order. Pt to return ome with pt spouse; pt spouse to transport pt home. CM needs complete at this time.      08/31/24 1222   Services At/After Discharge   Transition of Care Consult (CM Consult) Discharge Planning   Services At/After Discharge None   [place] Resource Information Provided? No   Mode of Transport at Discharge Other (see comment)  (Pt spouse)   Hospital Transport Time of Discharge 1300   Confirm Follow Up Transport Family   Condition of Participation: Discharge Planning   The Plan for Transition of Care is related to the following treatment goals: Return home independently   The Patient and/or Patient Representative was provided with a Choice of Provider? Patient   The Patient and/Or Patient Representative agree with the Discharge Plan? Yes     EZIO Garvin  Care Management  [facility]   [phone]

## 2024-08-31 NOTE — PROGRESS NOTES
0700. Bedside shift change report given to Nikolai RN (oncoming nurse) by Anupama RN (offgoing nurse). Report included the following information Nurse Handoff Report, Index, Intake/Output, MAR, Recent Results, and Cardiac Rhythm NSR .     1645. Patient discharged home with family. AVS provided in duplicate and all questions answered. All lines removed and all belongings with family at time of discharge.

## 2024-08-31 NOTE — PROGRESS NOTES
SURGERY PROGRESS NOTE      Admit Date: 2024    POD 1 Day Post-Op    Procedure: Procedure(s):  CHOLECYSTECTOMY LAPAROSCOPIC WITH GRAMS      Subjective:     Patient has no complaints.  Pain is controlled and tolerating a diet.      Objective:     BP (!) 135/49   Pulse 74   Temp 98.3 °F (36.8 °C) (Oral)   Resp 20   Ht 1.626 m (5' 4\")   Wt 85.3 kg (188 lb)   SpO2 96%   BMI 32.27 kg/m²      Temp (24hrs), Av °F (36.7 °C), Min:97.5 °F (36.4 °C), Max:98.7 °F (37.1 °C)      No intake/output data recorded.   1901 -  0700  In: 3750.4 [P.O.:640; I.V.:2917.8]  Out: 6100 [Urine:6100]    Physical Exam:    General:  alert, appears stated age, cooperative, and no distress   Abdomen: soft, bowel sounds active, non-tender    ALAINA - 30cc serosanguinous    Incision:   healing well, no drainage, no erythema, no hernia, no seroma, no swelling, no dehiscence, incision well approximated         CBC:   Lab Results   Component Value Date/Time    WBC 6.3 2024 04:50 AM    RBC 2.67 2024 04:50 AM    HGB 8.9 2024 04:50 AM    HCT 25.5 2024 04:50 AM    MCV 95.5 2024 04:50 AM    MCH 33.3 2024 04:50 AM    MCHC 34.9 2024 04:50 AM    RDW 13.4 2024 04:50 AM     2024 04:50 AM    MPV 9.1 2024 04:50 AM     CMP:    Lab Results   Component Value Date/Time     2024 04:50 AM    K 3.5 2024 04:50 AM     2024 04:50 AM    CO2 23 2024 04:50 AM    BUN 6 2024 04:50 AM    CREATININE 0.51 2024 04:50 AM    GFRAA >60 2022 11:33 AM    AGRATIO 1.0 2022 02:38 PM    LABGLOM >90 2024 04:50 AM    LABGLOM >60 2023 03:56 PM    LABGLOM >60 2022 02:38 PM    GLUCOSE 138 2024 04:50 AM    CALCIUM 9.1 2024 04:50 AM    BILITOT 2.2 2024 04:50 AM    ALKPHOS 151 2024 04:50 AM    ALKPHOS 97 2022 02:38 PM    AST 60 2024 04:50 AM     2024 04:50 AM       Assessment:     Principal  Problem:    Pulmonary embolism and infarction (HCC)  Resolved Problems:    * No resolved hospital problems. *    Doing well after cholecystectomy.  OK to discharge from surgery perspective    Plan:     1.. D/C [facility]  2.  D/C [facility]  3.  D/c home

## 2024-08-31 NOTE — DISCHARGE SUMMARY
DISCHARGE SUMMARY              Name: Iris De La Fuente  [id number]  YOB: 1950 (Age: 74 y.o.)   Date of Admission: 8/28/2024  Date of Discharge: 8/31/2024  Attending Physician: Malik Costello MD    DISCHARGE DIAGNOSIS:    E coli bacteremia POA  Ascending cholangitis  Choledocholithiasis   -S/P Laparoscopic cholecystectomy with cholangiogram, interpretation of cholangiogram 8/30  Bilateral segmental PE  Recent subdural hematoma status postconcussion  Hyponatremia  Hypokalemia  Hypomagnesemia  Recent admission  08/25 at [facility]  Closed fracture of multiple thoracic vertebrae with routine healing   Fracture of ulnar styloid   Traumatic hematoma of right hip   Subarachnoid hemorrhage following injury with concussion  Scalp laceration  Hypertension  Pain in the back with bruise likely due to fall      CONSULTATIONS:  IP CONSULT TO HEMATOLOGY  IP CONSULT TO NEUROSURGERY  IP CONSULT TO GI  IP CONSULT TO PHARMACY  IP CONSULT TO GENERAL SURGERY    Excerpted HPI from H&P of Major Angulo MD:  CHIEF COMPLAINT: Fever and chills     HISTORY OF PRESENT ILLNESS:     Iris De La Fuente is a 74 y.o.  female with PMHx significant for recent subdural hematoma status post traumatic fall 3 days ago, hypertension who presented to the ED with complaint of fever and chills.  Most of the HPI obtained from  who was at bedside  Reportedly patient fell last Saturday in the stairs and has loss of consciousness, was admitted at [facility], was found to have a small subdural hematoma (Suspected small acute subdural hemorrhage in the bilateral tentorial leaflets with ? subarachnoid hemorrhage in the adjacent right ambient cistern. ),right lung hematoma.  Patient did not get any surgery, was monitored and repeat CT of the head showed stable subdural hematoma.    reported that patient had fever chills yesterday temperature was found to have 101.5  Patient was transferred from [facility]  Laboratory Results:  Recent Labs     08/28/24  1816 08/29/24  0457 08/30/24  0450   *   < > 134*   K 3.2*   < > 3.5      < > 102   CO2 24   < > 23   BUN 5*   < > 6   CREATININE 0.59   < > 0.51*   PHOS 2.2*  --   --    MG 1.8  --  1.9    < > = values in this interval not displayed.     Recent Labs     08/30/24  0450   HGB 8.9*   HCT 25.5*   WBC 6.3            _______________________________________________________________________  DISCHARGE MEDICATIONS:      Medication List        START taking these medications      amLODIPine 5 MG tablet  Commonly known as: NORVASC  Take 1 tablet by mouth daily  Start taking on: September 1, 2024  Replaces: amLODIPine benzoate 1 MG/ML oral suspension     cephALEXin 500 MG capsule  Commonly known as: KEFLEX  Take 1 capsule by mouth 4 times daily for 10 days     ondansetron 4 MG tablet  Commonly known as: ZOFRAN  Take 1 tablet by mouth 3 times daily as needed for Nausea or Vomiting     oxyCODONE 5 MG immediate release tablet  Commonly known as: Roxicodone  Take 1 tablet by mouth every 6 hours as needed for Pain for up to 3 days. Intended supply: 3 days. Take lowest dose possible to manage pain Max Daily Amount: 20 mg            CONTINUE taking these medications      Cetirizine HCl 10 MG Caps     Cholecalciferol 50 MCG (2000 UT) Tabs     folic acid 1 MG tablet  Commonly known as: FOLVITE     hydroCHLOROthiazide 12.5 MG tablet     LORazepam 2 MG tablet  Commonly known as: ATIVAN     methotrexate 2.5 MG chemo tablet  Commonly known as: RHEUMATREX     metoprolol succinate 50 MG extended release tablet  Commonly known as: TOPROL XL     rosuvastatin 10 MG tablet  Commonly known as: CRESTOR  TAKE 1 TABLET BY MOUTH ONE TIME DAILY     TURMERIC PO            STOP taking these medications      amLODIPine benzoate 1 MG/ML oral suspension  Commonly known as: KATERZIA  Replaced by: amLODIPine 5 MG tablet               Where to Get Your Medications        These medications were  sent to [id number] - [place], [place] - [address] - P [phone] - F [phone] [address] [address]      Phone: [phone]   amLODIPine 5 MG tablet  cephALEXin 500 MG capsule  ondansetron 4 MG tablet  oxyCODONE 5 MG immediate release tablet           Condition at Discharge:  Stable  _____________________________________________________________________    Disposition  Home with family, no needs  _____________________________________________________________  Follow up with:   PCP : ARIELLE Christiansen MD Payne, C [place], [place]  [address] [address] [phone]    Follow up      ARIELLE Christiansen MD  [address] [address]  [phone]                Total time in minutes spent coordinating this discharge (includes going over instructions, follow-up, prescriptions, and preparing report for sign off to her PCP) :  35 minutes    Signed:  Malik Costello MD

## 2024-08-31 NOTE — PROGRESS NOTES
1900: Bedside shift change report given to Jose LAFLEUR (oncoming nurse) by Nikolai RN (offgoing nurse). Report included the following information Nurse Handoff Report, Intake/Output, MAR, Recent Results, and Cardiac Rhythm NSR .     End of Shift Note    Bedside shift change report given to Nikolai RN (oncoming nurse) by Jose Villasenor RN (offgoing nurse).  Report included the following information SBAR, Kardex, ED Summary, Intake/Output, MAR, Recent Results, and Cardiac Rhythm NSR    Shift worked:  6179-5406     Shift summary and any significant changes:     Pain management overnight, added lidocaine patch PRN.      Concerns for physician to address:  Caputo plan?     Zone phone for oncoming shift:          Activity:     Number times ambulated in hallways past shift: 0  Number of times OOB to chair past shift: 0    Cardiac:   Cardiac Monitoring: Yes           Access:  Current line(s): PIV     Genitourinary:   Urinary status: caputo    Respiratory:      Chronic home O2 use?: NO  Incentive spirometer at bedside: NO       GI:     Current diet:  ADULT DIET; Regular; Low Fat/Low Chol/High Fiber/2 gm Na  Passing flatus: YES  Tolerating current diet: YES       Pain Management:   Patient states pain is manageable on current regimen: YES    Skin:     Interventions: float heels, increase time out of bed, PT/OT consult, and internal/external urinary devices    Patient Safety:  Fall Score:    Interventions: bed/chair alarm, assistive device (walker, cane. etc), gripper socks, pt to call before getting OOB, and stay with me (per policy)       Length of Stay:  Expected LOS: 5  Actual LOS: 3      Jose Villasenor RN

## 2024-09-01 LAB
BACTERIA SPEC CULT: NORMAL
BACTERIA SPEC CULT: NORMAL
SERVICE CMNT-IMP: NORMAL
SERVICE CMNT-IMP: NORMAL

## 2024-09-06 ENCOUNTER — TELEPHONE (OUTPATIENT)
Facility: CLINIC | Age: 74
End: 2024-09-06

## 2024-09-06 ENCOUNTER — OFFICE VISIT (OUTPATIENT)
Facility: CLINIC | Age: 74
End: 2024-09-06

## 2024-09-06 VITALS
HEART RATE: 62 BPM | OXYGEN SATURATION: 100 % | TEMPERATURE: 97.7 F | SYSTOLIC BLOOD PRESSURE: 126 MMHG | BODY MASS INDEX: 29.71 KG/M2 | RESPIRATION RATE: 18 BRPM | HEIGHT: 64 IN | DIASTOLIC BLOOD PRESSURE: 80 MMHG | WEIGHT: 174 LBS

## 2024-09-06 DIAGNOSIS — S06.5XAA SUBDURAL HEMATOMA (HCC): ICD-10-CM

## 2024-09-06 DIAGNOSIS — Z76.89 ENCOUNTER FOR SUPPORT AND COORDINATION OF TRANSITION OF CARE: Primary | ICD-10-CM

## 2024-09-06 DIAGNOSIS — I26.99 PULMONARY EMBOLISM AND INFARCTION (HCC): ICD-10-CM

## 2024-09-06 DIAGNOSIS — I10 ESSENTIAL HYPERTENSION: ICD-10-CM

## 2024-09-06 RX ORDER — BENZOCAINE/MENTHOL 6 MG-10 MG
LOZENGE MUCOUS MEMBRANE
Qty: 30 G | Refills: 1 | Status: SHIPPED | OUTPATIENT
Start: 2024-09-06 | End: 2024-09-13

## 2024-09-06 RX ORDER — CLOBETASOL PROPIONATE 0.5 MG/G
CREAM TOPICAL
Qty: 30 G | Refills: 0 | Status: SHIPPED | OUTPATIENT
Start: 2024-09-06

## 2024-09-06 NOTE — PROGRESS NOTES
Iris De La Fuente is a 74 y.o. female and presents with remove staples in head  .    Subjective:  Mrs. De La Fuente presents today for transition of care follow-up.  She has been hospitalized at [facility], [facility], and [facility].  She sustained a fall resulting in a subdural hematoma and mild subarachnoid hemorrhage noted on CT.  Her follow-up scans were stable and she was discharged home.  She was feeling poorly and went to [facility] where she was found to have cholelithiasis, small bilateral pulmonary emboli and she was transferred to [facility] where she received treatment.  She underwent cholecystectomy with good results.  She has some mild irritation at the surgical trocar sites.  She has staples on the laceration on her head that need to be removed at this time.  She was not anticoagulated because of her subdural hematoma.  She was started on amlodipine 5 mg daily in addition for her blood pressure.  She has completed a course of antibiotics.  She remains on metoprolol and hydrochlorothiazide for her hypertension.  She remains on lorazepam for generalized anxiety.  Past Medical History:   Diagnosis Date    Acute lower UTI 1/18/2018    Age-related osteoporosis without current pathological fracture 11/9/2022    Annual physical exam 1/18/2018    Arrhythmia 2000    TACHYCARDIA RHYTHM x1 episode 2000    Arthritis     spinal stenosis; previous spine surgery    Cataract 1/18/2018    Chronic insomnia 1/18/2018    Dyslipidemia 1/18/2018    EBT (electron beam tomography) abnormal 1/18/2018    Fibrocystic breast changes 1/18/2018    High cholesterol     HTN (hypertension) 1/18/2018    Inflammatory bowel disease 1/18/2018    Nephrolithiasis 1/18/2018    On statin therapy 1/18/2018    Post hysterectomy menopause 1/18/2018    RA (rheumatoid arthritis) (HCC) 10/8/2020    Sciatica 1/18/2018    Visit for screening mammogram 1/18/2018     Past Surgical History:   Procedure

## 2024-09-06 NOTE — PROGRESS NOTES
Iris De La Fuente is a 74 y.o. female     Chief Complaint   Patient presents with    remove staples in head       /80 (Site: Left Upper Arm, Position: Sitting, Cuff Size: Medium Adult)   Pulse 62   Temp 97.7 °F (36.5 °C) (Temporal)   Resp 18   Ht 1.626 m (5' 4\")   Wt 78.9 kg (174 lb)   SpO2 100%   BMI 29.87 kg/m²     Health Maintenance Due   Topic Date Due    DTaP/Tdap/Td vaccine (1 - Tdap) Never done    Shingles vaccine (1 of 2) Never done    Respiratory Syncytial Virus (RSV) Pregnant or age 60 yrs+ (1 - 1-dose 60+ series) Never done    Pneumococcal 65+ years Vaccine (1 of 1 - PCV) Never done    Breast cancer screen  02/06/2020    Flu vaccine (1) Never done    COVID-19 Vaccine (3 - 2023-24 season) 09/01/2024         \"Have you been to the ER, urgent care clinic since your last visit?  Hospitalized since your last visit?\"    8/27/24; [facility]  8/28/24; [facility]    “Have you seen or consulted any other health care providers outside of [facility] since your last visit?”    NO       Have you had a mammogram?”   NO    Date of last Mammogram: 2/6/2018

## 2024-09-24 ENCOUNTER — OFFICE VISIT (OUTPATIENT)
Age: 74
End: 2024-09-24
Payer: MEDICARE

## 2024-09-24 VITALS
BODY MASS INDEX: 28.85 KG/M2 | HEART RATE: 67 BPM | OXYGEN SATURATION: 97 % | HEIGHT: 64 IN | WEIGHT: 169 LBS | SYSTOLIC BLOOD PRESSURE: 135 MMHG | RESPIRATION RATE: 17 BRPM | DIASTOLIC BLOOD PRESSURE: 76 MMHG | TEMPERATURE: 98.1 F

## 2024-09-24 DIAGNOSIS — I26.99 ACUTE PULMONARY EMBOLISM, UNSPECIFIED PULMONARY EMBOLISM TYPE, UNSPECIFIED WHETHER ACUTE COR PULMONALE PRESENT (HCC): Primary | ICD-10-CM

## 2024-09-24 DIAGNOSIS — S06.5XAA SUBDURAL HEMATOMA: ICD-10-CM

## 2024-09-24 DIAGNOSIS — I26.99 ACUTE PULMONARY EMBOLISM, UNSPECIFIED PULMONARY EMBOLISM TYPE, UNSPECIFIED WHETHER ACUTE COR PULMONALE PRESENT (HCC): ICD-10-CM

## 2024-09-24 PROCEDURE — G8417 CALC BMI ABV UP PARAM F/U: HCPCS | Performed by: STUDENT IN AN ORGANIZED HEALTH CARE EDUCATION/TRAINING PROGRAM

## 2024-09-24 PROCEDURE — 1090F PRES/ABSN URINE INCON ASSESS: CPT | Performed by: STUDENT IN AN ORGANIZED HEALTH CARE EDUCATION/TRAINING PROGRAM

## 2024-09-24 PROCEDURE — 1123F ACP DISCUSS/DSCN MKR DOCD: CPT | Performed by: STUDENT IN AN ORGANIZED HEALTH CARE EDUCATION/TRAINING PROGRAM

## 2024-09-24 PROCEDURE — 1111F DSCHRG MED/CURRENT MED MERGE: CPT | Performed by: STUDENT IN AN ORGANIZED HEALTH CARE EDUCATION/TRAINING PROGRAM

## 2024-09-24 PROCEDURE — 3078F DIAST BP <80 MM HG: CPT | Performed by: STUDENT IN AN ORGANIZED HEALTH CARE EDUCATION/TRAINING PROGRAM

## 2024-09-24 PROCEDURE — 1036F TOBACCO NON-USER: CPT | Performed by: STUDENT IN AN ORGANIZED HEALTH CARE EDUCATION/TRAINING PROGRAM

## 2024-09-24 PROCEDURE — 99214 OFFICE O/P EST MOD 30 MIN: CPT | Performed by: STUDENT IN AN ORGANIZED HEALTH CARE EDUCATION/TRAINING PROGRAM

## 2024-09-24 PROCEDURE — G8427 DOCREV CUR MEDS BY ELIG CLIN: HCPCS | Performed by: STUDENT IN AN ORGANIZED HEALTH CARE EDUCATION/TRAINING PROGRAM

## 2024-09-24 PROCEDURE — 3075F SYST BP GE 130 - 139MM HG: CPT | Performed by: STUDENT IN AN ORGANIZED HEALTH CARE EDUCATION/TRAINING PROGRAM

## 2024-09-24 PROCEDURE — 3017F COLORECTAL CA SCREEN DOC REV: CPT | Performed by: STUDENT IN AN ORGANIZED HEALTH CARE EDUCATION/TRAINING PROGRAM

## 2024-09-24 PROCEDURE — G8399 PT W/DXA RESULTS DOCUMENT: HCPCS | Performed by: STUDENT IN AN ORGANIZED HEALTH CARE EDUCATION/TRAINING PROGRAM

## 2024-09-24 RX ORDER — GABAPENTIN 300 MG/1
CAPSULE ORAL
COMMUNITY
Start: 2024-09-23

## 2024-09-25 LAB
ALBUMIN SERPL-MCNC: 4 G/DL (ref 3.5–5)
ALBUMIN/GLOB SERPL: 1.2 (ref 1.1–2.2)
ALP SERPL-CCNC: 120 U/L (ref 45–117)
ALT SERPL-CCNC: 51 U/L (ref 12–78)
ANION GAP SERPL CALC-SCNC: 9 MMOL/L (ref 2–12)
AST SERPL-CCNC: 30 U/L (ref 15–37)
BASOPHILS # BLD: 0 K/UL (ref 0–0.1)
BASOPHILS NFR BLD: 1 % (ref 0–1)
BILIRUB SERPL-MCNC: 0.6 MG/DL (ref 0.2–1)
BUN SERPL-MCNC: 14 MG/DL (ref 6–20)
BUN/CREAT SERPL: 17 (ref 12–20)
CALCIUM SERPL-MCNC: 9.6 MG/DL (ref 8.5–10.1)
CHLORIDE SERPL-SCNC: 95 MMOL/L (ref 97–108)
CO2 SERPL-SCNC: 25 MMOL/L (ref 21–32)
CREAT SERPL-MCNC: 0.84 MG/DL (ref 0.55–1.02)
D DIMER PPP FEU-MCNC: 8.43 MG/L FEU (ref 0–0.65)
DIFFERENTIAL METHOD BLD: ABNORMAL
EOSINOPHIL # BLD: 0.1 K/UL (ref 0–0.4)
EOSINOPHIL NFR BLD: 1 % (ref 0–7)
ERYTHROCYTE [DISTWIDTH] IN BLOOD BY AUTOMATED COUNT: 15 % (ref 11.5–14.5)
GLOBULIN SER CALC-MCNC: 3.4 G/DL (ref 2–4)
GLUCOSE SERPL-MCNC: 106 MG/DL (ref 65–100)
HCT VFR BLD AUTO: 39.3 % (ref 35–47)
HGB BLD-MCNC: 13.5 G/DL (ref 11.5–16)
IMM GRANULOCYTES # BLD AUTO: 0 K/UL (ref 0–0.04)
IMM GRANULOCYTES NFR BLD AUTO: 1 % (ref 0–0.5)
LYMPHOCYTES # BLD: 1.4 K/UL (ref 0.8–3.5)
LYMPHOCYTES NFR BLD: 24 % (ref 12–49)
MCH RBC QN AUTO: 33.6 PG (ref 26–34)
MCHC RBC AUTO-ENTMCNC: 34.4 G/DL (ref 30–36.5)
MCV RBC AUTO: 97.8 FL (ref 80–99)
MONOCYTES # BLD: 1.1 K/UL (ref 0–1)
MONOCYTES NFR BLD: 19 % (ref 5–13)
NEUTS SEG # BLD: 3.2 K/UL (ref 1.8–8)
NEUTS SEG NFR BLD: 54 % (ref 32–75)
NRBC # BLD: 0 K/UL (ref 0–0.01)
NRBC BLD-RTO: 0 PER 100 WBC
PLATELET # BLD AUTO: 244 K/UL (ref 150–400)
PMV BLD AUTO: 9.6 FL (ref 8.9–12.9)
POTASSIUM SERPL-SCNC: 3.2 MMOL/L (ref 3.5–5.1)
PROT SERPL-MCNC: 7.4 G/DL (ref 6.4–8.2)
RBC # BLD AUTO: 4.02 M/UL (ref 3.8–5.2)
SODIUM SERPL-SCNC: 129 MMOL/L (ref 136–145)
WBC # BLD AUTO: 5.8 K/UL (ref 3.6–11)

## 2024-09-26 ENCOUNTER — HOSPITAL ENCOUNTER (OUTPATIENT)
Facility: HOSPITAL | Age: 74
Discharge: HOME | End: 2024-09-29
Attending: STUDENT IN AN ORGANIZED HEALTH CARE EDUCATION/TRAINING PROGRAM
Payer: MEDICARE

## 2024-09-26 DIAGNOSIS — S06.5XAA SUBDURAL HEMATOMA: ICD-10-CM

## 2024-09-26 DIAGNOSIS — I26.99 ACUTE PULMONARY EMBOLISM, UNSPECIFIED PULMONARY EMBOLISM TYPE, UNSPECIFIED WHETHER ACUTE COR PULMONALE PRESENT (HCC): ICD-10-CM

## 2024-09-26 PROCEDURE — 6360000004 HC RX CONTRAST MEDICATION: Performed by: STUDENT IN AN ORGANIZED HEALTH CARE EDUCATION/TRAINING PROGRAM

## 2024-09-26 PROCEDURE — 71275 CT ANGIOGRAPHY CHEST: CPT

## 2024-09-26 PROCEDURE — 70450 CT HEAD/BRAIN W/O DYE: CPT

## 2024-09-26 RX ORDER — IOPAMIDOL 755 MG/ML
100 INJECTION, SOLUTION INTRAVASCULAR
Status: COMPLETED | OUTPATIENT
Start: 2024-09-26 | End: 2024-09-26

## 2024-09-26 RX ADMIN — IOPAMIDOL 100 ML: 755 INJECTION, SOLUTION INTRAVENOUS at 13:06

## 2024-09-30 ENCOUNTER — CLINICAL DOCUMENTATION (OUTPATIENT)
Age: 74
End: 2024-09-30

## 2024-09-30 DIAGNOSIS — F41.1 GENERALIZED ANXIETY DISORDER: Primary | ICD-10-CM

## 2024-09-30 RX ORDER — LORAZEPAM 2 MG/1
2 TABLET ORAL EVERY 6 HOURS PRN
Qty: 60 TABLET | Refills: 0 | Status: SHIPPED | OUTPATIENT
Start: 2024-09-30 | End: 2024-10-30

## 2024-09-30 NOTE — PROGRESS NOTES
Patient called and stated that she had a $330 co-pay for her Xarelto. Patient asked for assistance but she tsated she would be over the income limit for this.    Patient is leaving for vacation to [place] on 9/30/24. Was told to fill meds but to only take if symptoms reoccurred. Checked OV notes and confirmed with NP and Pharmacist that this was the correct instructions.by the provider. Patient did not want to spend $330 for a med she may not ever take.    Called patient back, confirmed instructions were correct. Informed her that she would need to decide what to do as there was no financial assistance and that she was leaving for vacation on Monday. Patient understood.

## 2024-09-30 NOTE — TELEPHONE ENCOUNTER
RX refill request from the patient/pharmacy. Patient last seen 09- with labs, and next appt. scheduled for 12-  Requested Prescriptions     Pending Prescriptions Disp Refills    LORazepam (ATIVAN) 2 MG tablet 60 tablet 0     Sig: Take 1 tablet by mouth every 6 hours as needed for Anxiety for up to 30 days. Max Daily Amount: 8 mg    .

## 2024-09-30 NOTE — TELEPHONE ENCOUNTER
LORazepam (ATIVAN) 2 mg tablet 60 Tablet 2 4/25/2023 --    Sig: TAKE TWO TABLETS BY MOUTH DAILY AT BEDTIME        Last visit 9/6/24  Future appt 12/16/24    Pharmacy [facility]

## 2024-10-10 PROBLEM — K81.0 ACUTE CHOLECYSTITIS: Status: ACTIVE | Noted: 2024-10-10

## 2024-10-28 DIAGNOSIS — F41.1 GENERALIZED ANXIETY DISORDER: ICD-10-CM

## 2024-10-31 RX ORDER — AMLODIPINE BESYLATE 5 MG/1
5 TABLET ORAL DAILY
Qty: 30 TABLET | Refills: 1 | Status: SHIPPED | OUTPATIENT
Start: 2024-10-31 | End: 2024-12-30

## 2024-10-31 RX ORDER — LORAZEPAM 2 MG/1
TABLET ORAL
Qty: 60 TABLET | Refills: 2 | Status: SHIPPED | OUTPATIENT
Start: 2024-10-31 | End: 2025-01-29

## 2024-10-31 NOTE — TELEPHONE ENCOUNTER
PCP: ARIELLE Christiansen MD    Last appt: 9/6/2024    Future Appointments   Date Time Provider Department Center   12/16/2024 10:15 AM ARIELLE Christiansen MD [facility]       Requested Prescriptions     Pending Prescriptions Disp Refills    LORazepam (ATIVAN) 2 MG tablet [Pharmacy Med Name: LORazepam Oral Tablet 2 MG] 60 tablet 0     Sig: TAKE ONE TABLET BY MOUTH EVERY SIX HOURS AS NEEDED FOR ANXIETY FOR UP TO 30 DAYS. MAX 8MG DAILY.

## 2024-10-31 NOTE — TELEPHONE ENCOUNTER
amLODIPine (NORVASC) 5 MG tablet 30 tablet 1 9/1/2024 10/31/2024    Sig - Route: Take 1 tablet by mouth daily - Oral      Dr. Gan [facility]    Last visit 9/6/24  Future appt 12/16/24    Pharmacy [facility] [address]

## 2024-10-31 NOTE — TELEPHONE ENCOUNTER
PCP: ARIELLE Christiansen MD    Last appt: 9/6/2024  Future Appointments   Date Time Provider Department Center   12/16/2024 10:15 AM ARIELLE Christiansen MD [facility]       Requested Prescriptions     Pending Prescriptions Disp Refills    amLODIPine (NORVASC) 5 MG tablet 30 tablet 1     Sig: Take 1 tablet by mouth daily       Prior labs and Blood pressures:  BP Readings from Last 3 Encounters:   09/24/24 135/76   09/06/24 126/80   08/31/24 (!) 178/75     Lab Results   Component Value Date/Time     09/24/2024 03:53 PM    K 3.2 09/24/2024 03:53 PM    CL 95 09/24/2024 03:53 PM    CO2 25 09/24/2024 03:53 PM    BUN 14 09/24/2024 03:53 PM    GFRAA >60 04/22/2022 11:33 AM     No results found for: \"HBA1C\", \"[id number]\"  Lab Results   Component Value Date/Time    CHOL 201 06/26/2024 11:46 AM    HDL 66 06/26/2024 11:46 AM     06/26/2024 11:46 AM    .4 11/29/2023 03:56 PM    VLDL 31 06/26/2024 11:46 AM    VLDL 25 10/08/2020 01:44 PM     No results found for: \"VITD3\"        Lab Results   Component Value Date/Time    TSH 1.46 11/29/2023 03:56 PM

## 2024-11-27 NOTE — TELEPHONE ENCOUNTER
PCP: ARIELLE Christiansen MD    Last appt: 9/6/2024  Future Appointments   Date Time Provider Department Center   12/16/2024 10:15 AM ARIELLE Christiansen MD [facility] DEP       Requested Prescriptions     Pending Prescriptions Disp Refills    amLODIPine (NORVASC) 5 MG tablet [Pharmacy Med Name: AMLODIPINE BESYLATE 5 MG TAB] 90 tablet 1     Sig: TAKE 1 TABLET BY MOUTH EVERY DAY       Prior labs and Blood pressures:  BP Readings from Last 3 Encounters:   09/24/24 135/76   09/06/24 126/80   08/31/24 (!) 178/75     Lab Results   Component Value Date/Time     09/24/2024 03:53 PM    K 3.2 09/24/2024 03:53 PM    CL 95 09/24/2024 03:53 PM    CO2 25 09/24/2024 03:53 PM    BUN 14 09/24/2024 03:53 PM    GFRAA >60 04/22/2022 11:33 AM     No results found for: \"HBA1C\", \"BLX4NIAH\"  Lab Results   Component Value Date/Time    CHOL 201 06/26/2024 11:46 AM    HDL 66 06/26/2024 11:46 AM     06/26/2024 11:46 AM    .4 11/29/2023 03:56 PM    VLDL 31 06/26/2024 11:46 AM    VLDL 25 10/08/2020 01:44 PM     No results found for: \"VITD3\"        Lab Results   Component Value Date/Time    TSH 1.46 11/29/2023 03:56 PM

## 2024-11-29 RX ORDER — AMLODIPINE BESYLATE 5 MG/1
5 TABLET ORAL DAILY
Qty: 90 TABLET | Refills: 1 | Status: SHIPPED | OUTPATIENT
Start: 2024-11-29

## 2024-12-02 DIAGNOSIS — I26.99 ACUTE PULMONARY EMBOLISM, UNSPECIFIED PULMONARY EMBOLISM TYPE, UNSPECIFIED WHETHER ACUTE COR PULMONALE PRESENT (HCC): Primary | ICD-10-CM

## 2024-12-02 DIAGNOSIS — S06.5XAA SUBDURAL HEMATOMA: ICD-10-CM

## 2024-12-02 NOTE — PROGRESS NOTES
PER OG FROM Dr Taveras TO AMINA GONZALEZ RN for a one time order for a D-dimer.    Pt needs to schedule a f/u appt to review lab

## 2024-12-04 RX ORDER — ROSUVASTATIN CALCIUM 10 MG/1
10 TABLET, COATED ORAL DAILY
Qty: 90 TABLET | Refills: 1 | Status: SHIPPED | OUTPATIENT
Start: 2024-12-04

## 2024-12-04 NOTE — TELEPHONE ENCOUNTER
PCP: ARIELLE Christiansen MD    Last appt: 9/6/2024    Future Appointments   Date Time Provider Department Center   12/16/2024 10:15 AM ARIELLE Christiansen MD [facility]   12/17/2024  2:00 PM Lula Taveras MD [facility]       Requested Prescriptions     Pending Prescriptions Disp Refills    rosuvastatin (CRESTOR) 10 MG tablet [Pharmacy Med Name: Rosuvastatin Calcium Oral Tablet 10 MG] 90 tablet 0     Sig: TAKE 1 TABLET BY MOUTH ONE TIME DAILY

## 2024-12-05 DIAGNOSIS — S06.5XAA SUBDURAL HEMATOMA: ICD-10-CM

## 2024-12-05 DIAGNOSIS — I26.99 ACUTE PULMONARY EMBOLISM, UNSPECIFIED PULMONARY EMBOLISM TYPE, UNSPECIFIED WHETHER ACUTE COR PULMONALE PRESENT (HCC): ICD-10-CM

## 2024-12-06 LAB — D DIMER PPP FEU-MCNC: 0.89 MG/L FEU (ref 0–0.65)

## 2024-12-16 ENCOUNTER — OFFICE VISIT (OUTPATIENT)
Facility: CLINIC | Age: 74
End: 2024-12-16

## 2024-12-16 ENCOUNTER — TELEPHONE (OUTPATIENT)
Age: 74
End: 2024-12-16

## 2024-12-16 VITALS
SYSTOLIC BLOOD PRESSURE: 119 MMHG | DIASTOLIC BLOOD PRESSURE: 80 MMHG | HEIGHT: 64 IN | RESPIRATION RATE: 19 BRPM | OXYGEN SATURATION: 96 % | WEIGHT: 180.4 LBS | BODY MASS INDEX: 30.8 KG/M2 | TEMPERATURE: 98 F | HEART RATE: 68 BPM

## 2024-12-16 DIAGNOSIS — F51.04 CHRONIC INSOMNIA: ICD-10-CM

## 2024-12-16 DIAGNOSIS — M54.50 CHRONIC RIGHT-SIDED LOW BACK PAIN WITHOUT SCIATICA: ICD-10-CM

## 2024-12-16 DIAGNOSIS — M05.79 RHEUMATOID ARTHRITIS INVOLVING MULTIPLE SITES WITH POSITIVE RHEUMATOID FACTOR (HCC): ICD-10-CM

## 2024-12-16 DIAGNOSIS — R73.02 IGT (IMPAIRED GLUCOSE TOLERANCE): ICD-10-CM

## 2024-12-16 DIAGNOSIS — I10 ESSENTIAL HYPERTENSION: Primary | ICD-10-CM

## 2024-12-16 DIAGNOSIS — I26.99 PULMONARY EMBOLISM AND INFARCTION (HCC): ICD-10-CM

## 2024-12-16 DIAGNOSIS — Z79.899 ON STATIN THERAPY: ICD-10-CM

## 2024-12-16 DIAGNOSIS — G89.29 CHRONIC RIGHT-SIDED LOW BACK PAIN WITHOUT SCIATICA: ICD-10-CM

## 2024-12-16 DIAGNOSIS — M81.0 AGE-RELATED OSTEOPOROSIS WITHOUT CURRENT PATHOLOGICAL FRACTURE: ICD-10-CM

## 2024-12-16 DIAGNOSIS — Z00.00 MEDICARE ANNUAL WELLNESS VISIT, SUBSEQUENT: ICD-10-CM

## 2024-12-16 DIAGNOSIS — E78.5 DYSLIPIDEMIA: ICD-10-CM

## 2024-12-16 RX ORDER — TRIAMCINOLONE ACETONIDE 1 MG/G
CREAM TOPICAL
Qty: 80 G | Refills: 0 | Status: SHIPPED | OUTPATIENT
Start: 2024-12-16

## 2024-12-16 SDOH — ECONOMIC STABILITY: FOOD INSECURITY: WITHIN THE PAST 12 MONTHS, THE FOOD YOU BOUGHT JUST DIDN'T LAST AND YOU DIDN'T HAVE MONEY TO GET MORE.: NEVER TRUE

## 2024-12-16 SDOH — ECONOMIC STABILITY: FOOD INSECURITY: WITHIN THE PAST 12 MONTHS, YOU WORRIED THAT YOUR FOOD WOULD RUN OUT BEFORE YOU GOT MONEY TO BUY MORE.: NEVER TRUE

## 2024-12-16 SDOH — ECONOMIC STABILITY: INCOME INSECURITY: HOW HARD IS IT FOR YOU TO PAY FOR THE VERY BASICS LIKE FOOD, HOUSING, MEDICAL CARE, AND HEATING?: NOT HARD AT ALL

## 2024-12-16 ASSESSMENT — LIFESTYLE VARIABLES
HOW OFTEN DO YOU HAVE A DRINK CONTAINING ALCOHOL: 2-3 TIMES A WEEK
HOW MANY STANDARD DRINKS CONTAINING ALCOHOL DO YOU HAVE ON A TYPICAL DAY: PATIENT DOES NOT DRINK

## 2024-12-16 ASSESSMENT — PATIENT HEALTH QUESTIONNAIRE - PHQ9
SUM OF ALL RESPONSES TO PHQ QUESTIONS 1-9: 0
SUM OF ALL RESPONSES TO PHQ9 QUESTIONS 1 & 2: 0
1. LITTLE INTEREST OR PLEASURE IN DOING THINGS: NOT AT ALL
2. FEELING DOWN, DEPRESSED OR HOPELESS: NOT AT ALL
SUM OF ALL RESPONSES TO PHQ QUESTIONS 1-9: 0

## 2024-12-16 NOTE — PROGRESS NOTES
Iris De La Fuente is a 74 y.o. female     Chief Complaint   Patient presents with    Medicare AWV       /80 (Site: Left Upper Arm, Position: Sitting, Cuff Size: Medium Adult)   Pulse 68   Temp 98 °F (36.7 °C) (Temporal)   Resp 19   Ht 1.626 m (5' 4\")   Wt 81.8 kg (180 lb 6.4 oz)   SpO2 96%   BMI 30.97 kg/m²     Health Maintenance Due   Topic Date Due    DTaP/Tdap/Td vaccine (1 - Tdap) Never done    Shingles vaccine (1 of 2) Never done    Respiratory Syncytial Virus (RSV) Pregnant or age 60 yrs+ (1 - Risk 60-74 years 1-dose series) Never done    Pneumococcal 65+ years Vaccine (1 of 1 - PCV) Never done    Breast cancer screen  02/06/2020    Flu vaccine (1) Never done    COVID-19 Vaccine (3 - 2023-24 season) 09/01/2024    Annual Wellness Visit (Medicare)  11/29/2024         \"Have you been to the ER, urgent care clinic since your last visit?  Hospitalized since your last visit?\"    On file    “Have you seen or consulted any other health care providers outside of [facility] since your last visit?”    NO       Have you had a mammogram?”   NO    Date of last Mammogram: 2/6/2018                 
TURMERIC PO Take by mouth in the morning and at bedtime      Cetirizine HCl 10 MG CAPS Take by mouth      Cholecalciferol 50 MCG (2000 UT) TABS Take 1 tablet by mouth every morning      folic acid (FOLVITE) 1 MG tablet Take by mouth daily      methotrexate (RHEUMATREX) 2.5 MG chemo tablet Take 9 tablets by mouth once a week Every Monday night      metoprolol succinate (TOPROL XL) 50 MG extended release tablet Take 2 tablets by mouth at bedtime      rivaroxaban 15 & 20 MG Starter Pack Take as directed on package. (Patient not taking: Reported on 12/16/2024) 1 each 0    clobetasol (TEMOVATE) 0.05 % cream Apply topically 2 times daily. (Patient not taking: Reported on 12/16/2024) 30 g 0    ondansetron (ZOFRAN) 4 MG tablet Take 1 tablet by mouth 3 times daily as needed for Nausea or Vomiting (Patient not taking: Reported on 12/16/2024) 15 tablet 0     No current facility-administered medications for this visit.     Social History     Socioeconomic History    Marital status:    Tobacco Use    Smoking status: Never    Smokeless tobacco: Never   Vaping Use    Vaping status: Never Used   Substance and Sexual Activity    Alcohol use: Yes     Alcohol/week: 10.0 standard drinks of alcohol     Types: 10 Standard drinks or equivalent per week     Comment: 2-3 beers daily    Drug use: Yes     Types: Prescription    Sexual activity: Not Currently     Partners: Male     Social Determinants of Health     Financial Resource Strain: Low Risk  (12/16/2024)    Overall Financial Resource Strain (CARDIA)     Difficulty of Paying Living Expenses: Not hard at all   Food Insecurity: No Food Insecurity (12/16/2024)    Hunger Vital Sign     Worried About Running Out of Food in the Last Year: Never true     Ran Out of Food in the Last Year: Never true   Transportation Needs: No Transportation Needs (12/16/2024)    PRAPARE - Transportation     Lack of Transportation (Medical): No     Lack of Transportation (Non-Medical): No   Physical

## 2024-12-16 NOTE — TELEPHONE ENCOUNTER
----- Message from FREDDY Cohen CNP sent at 12/6/2024  2:37 PM EST -----  D dimer is elevated but improved/decreased from last lab draw. Her CTA in September did not show a clot in the lungs. But per Darcy's note she is not anticoagulated currently. So if any SOB or chest pain has developed I would recommend ER evaluation.   Otherwise we will see her at upcoming appointment.  ----- Message -----  From: Jean Santamaria Incoming [employer]  Sent: 12/6/2024  12:28 AM EST  To: FREDDY Coburn CNP

## 2024-12-16 NOTE — TELEPHONE ENCOUNTER
Pt returned nurse call. HIPAA verified by two patient identifiers.     Pt denies SOB and chest pain. Pt confirmed appt for tomorrow.

## 2024-12-16 NOTE — PATIENT INSTRUCTIONS
several relapses before they are able to quit for good.  Follow-up care is a key part of your treatment and safety. Be sure to make and go to all appointments, and call your doctor if you are having problems. It's also a good idea to know your test results and keep a list of the medicines you take.  When should you call for help?   Call 911  anytime you think you may need emergency care. For example, call if you or someone else:    Has overdosed or has withdrawal signs. Be sure to tell the emergency workers that you are or someone else is using or trying to quit using drugs. Overdose or withdrawal signs may include:  Losing consciousness.  Seizure.  Seeing or hearing things that aren't there (hallucinations).     Is thinking or talking about suicide or harming others.   Where to get help 24 hours a day, 7 days a week   If you or someone you know talks about suicide, self-harm, a mental health crisis, a substance use crisis, or any other kind of emotional distress, get help right away. You can:    Call the Suicide and Crisis Lifeline at 246.     Call 4-579-529-WVNE (1-679.223.3216).     Text HOME to 967946 to access the Crisis Text Line.   Consider saving these numbers in your phone.  Go to [employer] for more information or to chat online.  Call your doctor now or seek immediate medical care if:    You are having withdrawal symptoms. These may include nausea or vomiting, sweating, shakiness, and anxiety.   Watch closely for changes in your health, and be sure to contact your doctor if:    You have a relapse.     You need more help or support to stop.   Where can you learn more?  Go to https://www.[employer].net/patientEd and enter H573 to learn more about \"Substance Use Disorder: Care Instructions.\"  Current as of: November 15, 2023  Content Version: 14.2  © 2024 [employer].   Care instructions adapted under license by [employer]. If you have questions about a medical condition or this

## 2024-12-17 ENCOUNTER — OFFICE VISIT (OUTPATIENT)
Age: 74
End: 2024-12-17
Payer: MEDICARE

## 2024-12-17 VITALS
HEART RATE: 71 BPM | HEIGHT: 64 IN | OXYGEN SATURATION: 95 % | TEMPERATURE: 98.2 F | SYSTOLIC BLOOD PRESSURE: 150 MMHG | DIASTOLIC BLOOD PRESSURE: 79 MMHG | BODY MASS INDEX: 30.73 KG/M2 | WEIGHT: 180 LBS

## 2024-12-17 DIAGNOSIS — S06.5XAA SUBDURAL HEMATOMA: Primary | ICD-10-CM

## 2024-12-17 DIAGNOSIS — Z86.711 HISTORY OF PULMONARY EMBOLISM: ICD-10-CM

## 2024-12-17 LAB
ALBUMIN SERPL-MCNC: 3.9 G/DL (ref 3.5–5)
ALBUMIN/GLOB SERPL: 1.2 (ref 1.1–2.2)
ALP SERPL-CCNC: 107 U/L (ref 45–117)
ALT SERPL-CCNC: 27 U/L (ref 12–78)
ANION GAP SERPL CALC-SCNC: 8 MMOL/L (ref 2–12)
AST SERPL-CCNC: 24 U/L (ref 15–37)
BILIRUB SERPL-MCNC: 0.5 MG/DL (ref 0.2–1)
BUN SERPL-MCNC: 12 MG/DL (ref 6–20)
BUN/CREAT SERPL: 19 (ref 12–20)
CALCIUM SERPL-MCNC: 9.4 MG/DL (ref 8.5–10.1)
CHLORIDE SERPL-SCNC: 95 MMOL/L (ref 97–108)
CHOLEST SERPL-MCNC: 300 MG/DL
CK SERPL-CCNC: 74 U/L (ref 26–192)
CO2 SERPL-SCNC: 27 MMOL/L (ref 21–32)
CREAT SERPL-MCNC: 0.63 MG/DL (ref 0.55–1.02)
GLOBULIN SER CALC-MCNC: 3.2 G/DL (ref 2–4)
GLUCOSE SERPL-MCNC: 123 MG/DL (ref 65–100)
HDLC SERPL-MCNC: 63 MG/DL
HDLC SERPL: 4.8 (ref 0–5)
LDLC SERPL CALC-MCNC: 207.6 MG/DL (ref 0–100)
POTASSIUM SERPL-SCNC: 4 MMOL/L (ref 3.5–5.1)
PROT SERPL-MCNC: 7.1 G/DL (ref 6.4–8.2)
SODIUM SERPL-SCNC: 130 MMOL/L (ref 136–145)
TRIGL SERPL-MCNC: 147 MG/DL
VLDLC SERPL CALC-MCNC: 29.4 MG/DL

## 2024-12-17 PROCEDURE — 99213 OFFICE O/P EST LOW 20 MIN: CPT | Performed by: STUDENT IN AN ORGANIZED HEALTH CARE EDUCATION/TRAINING PROGRAM

## 2024-12-17 PROCEDURE — 1036F TOBACCO NON-USER: CPT | Performed by: STUDENT IN AN ORGANIZED HEALTH CARE EDUCATION/TRAINING PROGRAM

## 2024-12-17 PROCEDURE — 1123F ACP DISCUSS/DSCN MKR DOCD: CPT | Performed by: STUDENT IN AN ORGANIZED HEALTH CARE EDUCATION/TRAINING PROGRAM

## 2024-12-17 PROCEDURE — 3017F COLORECTAL CA SCREEN DOC REV: CPT | Performed by: STUDENT IN AN ORGANIZED HEALTH CARE EDUCATION/TRAINING PROGRAM

## 2024-12-17 PROCEDURE — G8399 PT W/DXA RESULTS DOCUMENT: HCPCS | Performed by: STUDENT IN AN ORGANIZED HEALTH CARE EDUCATION/TRAINING PROGRAM

## 2024-12-17 PROCEDURE — G8484 FLU IMMUNIZE NO ADMIN: HCPCS | Performed by: STUDENT IN AN ORGANIZED HEALTH CARE EDUCATION/TRAINING PROGRAM

## 2024-12-17 PROCEDURE — 1126F AMNT PAIN NOTED NONE PRSNT: CPT | Performed by: STUDENT IN AN ORGANIZED HEALTH CARE EDUCATION/TRAINING PROGRAM

## 2024-12-17 PROCEDURE — G8428 CUR MEDS NOT DOCUMENT: HCPCS | Performed by: STUDENT IN AN ORGANIZED HEALTH CARE EDUCATION/TRAINING PROGRAM

## 2024-12-17 PROCEDURE — G8417 CALC BMI ABV UP PARAM F/U: HCPCS | Performed by: STUDENT IN AN ORGANIZED HEALTH CARE EDUCATION/TRAINING PROGRAM

## 2024-12-17 PROCEDURE — 3078F DIAST BP <80 MM HG: CPT | Performed by: STUDENT IN AN ORGANIZED HEALTH CARE EDUCATION/TRAINING PROGRAM

## 2024-12-17 PROCEDURE — 3077F SYST BP >= 140 MM HG: CPT | Performed by: STUDENT IN AN ORGANIZED HEALTH CARE EDUCATION/TRAINING PROGRAM

## 2024-12-17 PROCEDURE — 1090F PRES/ABSN URINE INCON ASSESS: CPT | Performed by: STUDENT IN AN ORGANIZED HEALTH CARE EDUCATION/TRAINING PROGRAM

## 2024-12-17 NOTE — PROGRESS NOTES
Iris De La Fuente is a 74 y.o. female here for follow up for B/L PE.  Pt says she is hanging in there. No concerns brought up.     1. Have you been to the ER, urgent care clinic since your last visit?  Hospitalized since your last visit? no    2. Have you seen or consulted any other health care providers outside of the [facility] since your last visit?  Include any pap smears or colon screening. Rheumatologist  every month

## 2024-12-17 NOTE — PROGRESS NOTES
[facility] [place] at [facility]  [address] [facility] [address] [address]  W: [phone] F: [phone]  Hematology/ Oncology Consult Note      Reason for consult:     Iris De La Fuente is a 74 y.o. female who we have been asked to see by Dr. Angulo for anticoagulation recommendations given recent subdural hematoma with bilateral PE findings.    Subjective:     Iris De La Fuente is a 74-year-old female patient who was transferred here from [facility] for further evaluation given recent subdural hematoma and admission at [facility].  Now with fever of unknown origin, hyponatremia, elevated LFTs, findings of subsegmental PE, hyponatremia and anemia.  Patient was recently hospitalized at [facility] after a fall that resulted in a closed fracture of thoracic vertebrae, traumatic hematoma of right hip and subarachnoid hemorrhage with subdural hematoma and concussion.  Past medical history includes RA, hypertension, sciatica and arthritis.    Patient seen at the bedside with her .  Discussed briefly overall evaluation with neurosurgery and GI today.  Discussed findings of subsegmental PE in setting of recent subarachnoid hemorrhage and hematoma.  Discussed overall anticoagulation would be very high risk for further bleeding intracranially.  Discussed further evaluation with bilateral lower extremity Dopplers that are pending.  At this time patient appears stable with no increased dyspnea or chest pain.  Patient and her  verbalized understanding and agreement.  Dr. Angulo also present at time of visit at bedside.    Review of Systems:  Pertinent items are noted in the History of Present Illness.     Interval History:     12/17/24    Doing well. Clinically asymptomatic.  Overall in good spirits.     Past Medical History:   Diagnosis Date    Acute lower UTI 1/18/2018    Age-related osteoporosis without current pathological fracture 11/9/2022    Annual

## 2025-01-20 ENCOUNTER — TRANSCRIBE ORDERS (OUTPATIENT)
Facility: HOSPITAL | Age: 75
End: 2025-01-20

## 2025-01-20 DIAGNOSIS — M54.16 LUMBAR RADICULITIS: Primary | ICD-10-CM

## 2025-01-26 DIAGNOSIS — F41.1 GENERALIZED ANXIETY DISORDER: ICD-10-CM

## 2025-01-27 DIAGNOSIS — F41.1 GENERALIZED ANXIETY DISORDER: ICD-10-CM

## 2025-01-27 RX ORDER — LORAZEPAM 2 MG/1
TABLET ORAL
Qty: 60 TABLET | Refills: 0 | Status: SHIPPED | OUTPATIENT
Start: 2025-01-27 | End: 2025-02-26

## 2025-01-27 NOTE — TELEPHONE ENCOUNTER
PCP: ARIELLE Christiansen MD    Last appt: 12/16/2024    Future Appointments   Date Time Provider Department Center   2/3/2025  3:45 PM [facility] MRI 1 [facility]   6/18/2025  2:15 PM ARIELLE Christiansen MD [facility]       Requested Prescriptions     Pending Prescriptions Disp Refills    LORazepam (ATIVAN) 2 MG tablet [Pharmacy Med Name: LORazepam Oral Tablet 2 MG] 60 tablet 0     Sig: TAKE ONE TABLET BY MOUTH EVERY SIX HOURS AS NEEDED FOR ANXIETY FOR UP TO 30 DAYS (MAXIMUM OF 8MG  DAILY)

## 2025-01-29 RX ORDER — LORAZEPAM 2 MG/1
TABLET ORAL
Qty: 60 TABLET | Refills: 0 | OUTPATIENT
Start: 2025-01-29 | End: 2025-02-28

## 2025-02-03 ENCOUNTER — HOSPITAL ENCOUNTER (OUTPATIENT)
Facility: HOSPITAL | Age: 75
Discharge: HOME | End: 2025-02-06
Payer: MEDICARE

## 2025-02-03 DIAGNOSIS — M54.16 LUMBAR RADICULITIS: ICD-10-CM

## 2025-02-03 PROCEDURE — 6360000004 HC RX CONTRAST MEDICATION: Performed by: SPECIALIST

## 2025-02-03 PROCEDURE — A9579 GAD-BASE MR CONTRAST NOS,1ML: HCPCS | Performed by: SPECIALIST

## 2025-02-03 PROCEDURE — 72158 MRI LUMBAR SPINE W/O & W/DYE: CPT

## 2025-02-03 RX ADMIN — GADOTERIDOL 17 ML: 279.3 INJECTION, SOLUTION INTRAVENOUS at 16:30

## 2025-02-28 ENCOUNTER — TELEPHONE (OUTPATIENT)
Facility: CLINIC | Age: 75
End: 2025-02-28

## 2025-02-28 NOTE — TELEPHONE ENCOUNTER
Patient is requesting a refill on   LORazepam (ATIVAN) 2 MG tablet. Please send to [facility] on file. She states she will be out of medication tomorrow.

## 2025-03-03 DIAGNOSIS — F41.1 GENERALIZED ANXIETY DISORDER: Primary | ICD-10-CM

## 2025-03-03 RX ORDER — LORAZEPAM 1 MG/1
TABLET ORAL
Qty: 60 TABLET | Refills: 2 | Status: SHIPPED | OUTPATIENT
Start: 2025-03-03 | End: 2025-06-03

## 2025-03-03 NOTE — TELEPHONE ENCOUNTER
PCP: ARIELLE Christiansen MD    Last appt: 12/16/2024    Future Appointments   Date Time Provider Department Center   6/18/2025  2:15 PM ARIELLE Christiansen MD [facility] DEP       Requested Prescriptions     Pending Prescriptions Disp Refills    LORazepam (ATIVAN) 1 MG tablet 60 tablet 0     Sig: TAKE TWO TABLETS BY MOUTH EVERY SIX HOURS AS NEEDED FOR ANXIETY FOR UP TO 30 DAYS (MAXIMUM OF 8MG  DAILY)

## 2025-03-03 NOTE — TELEPHONE ENCOUNTER
Disp Refills Start End    LORazepam (ATIVAN) 2 MG tablet 60 tablet 0 1/27/2025 2/26/2025    Sig: TAKE ONE TABLET BY MOUTH EVERY SIX HOURS AS NEEDED FOR ANXIETY FOR UP TO 30 DAYS (MAXIMUM OF 8MG  DAILY)      Last visit 12/16/24  Future appt 6/18/25    Pharmacy [facility]    Patient is out of her medication

## 2025-03-20 NOTE — TELEPHONE ENCOUNTER
Patients  is calling and states that his wife needs a refill on her medication.      PCP: ARIELLE Chritsiansen MD    Last appt:   12/16/2024    Future Appointments   Date Time Provider Department Center   6/18/2025  2:15 PM ARIELLE Christiansen MD [facility] DEP       Requested Prescriptions     Pending Prescriptions Disp Refills    triamcinolone (KENALOG) 0.1 % cream 80 g 0     Sig: Apply topically 2 times daily.      
eval for delirium / agitation

## 2025-03-21 RX ORDER — TRIAMCINOLONE ACETONIDE 1 MG/G
CREAM TOPICAL
Qty: 80 G | Refills: 0 | Status: SHIPPED | OUTPATIENT
Start: 2025-03-21

## 2025-05-29 DIAGNOSIS — F41.1 GENERALIZED ANXIETY DISORDER: ICD-10-CM

## 2025-05-30 RX ORDER — LORAZEPAM 1 MG/1
TABLET ORAL
Qty: 60 TABLET | Refills: 2 | Status: SHIPPED | OUTPATIENT
Start: 2025-05-30 | End: 2025-08-30

## 2025-05-30 NOTE — TELEPHONE ENCOUNTER
PCP: ARIELLE Christiansen MD    Last appt: 12/16/2024    Future Appointments   Date Time Provider Department Center   6/18/2025  2:15 PM ARIELLE Christiansen MD [facility]       Requested Prescriptions     Pending Prescriptions Disp Refills    LORazepam (ATIVAN) 1 MG tablet [Pharmacy Med Name: LORazepam Oral Tablet 1 MG] 60 tablet 0     Sig: TAKE TWO TABLETS BY MOUTH EVERY SIX HOURS AS NEEDED for anxiety for up to 30 days (max 8 mg daily)

## 2025-06-02 RX ORDER — AMLODIPINE BESYLATE 5 MG/1
5 TABLET ORAL DAILY
Qty: 90 TABLET | Refills: 1 | Status: SHIPPED | OUTPATIENT
Start: 2025-06-02

## 2025-06-16 RX ORDER — ROSUVASTATIN CALCIUM 10 MG/1
10 TABLET, COATED ORAL DAILY
Qty: 90 TABLET | Refills: 1 | Status: SHIPPED | OUTPATIENT
Start: 2025-06-16

## 2025-06-16 NOTE — TELEPHONE ENCOUNTER
PCP: ARIELLE Christiansen MD    Last appt: 12/16/2024    No future appointments.    Requested Prescriptions     Pending Prescriptions Disp Refills    rosuvastatin (CRESTOR) 10 MG tablet [Pharmacy Med Name: Rosuvastatin Calcium Oral Tablet 10 MG] 90 tablet 1     Sig: TAKE 1 TABLET BY MOUTH ONE TIME DAILY

## (undated) DEVICE — REM POLYHESIVE ADULT PATIENT RETURN ELECTRODE: Brand: VALLEYLAB

## (undated) DEVICE — Device

## (undated) DEVICE — SOLUTION IV 500ML 0.9% SOD CHL PH 5 INJ USP VIAFLX PLAS

## (undated) DEVICE — DECANTER BAG 9": Brand: MEDLINE INDUSTRIES, INC.

## (undated) DEVICE — NEONATAL-ADULT SPO2 SENSOR: Brand: NELLCOR

## (undated) DEVICE — SOLID-TIP ABLATION CATHETER CABLE, STERILE CABLE: Brand: BLAZER™

## (undated) DEVICE — CATH RMFG DECA DUO 7F2/8 95S --

## (undated) DEVICE — TISSUE RETRIEVAL SYSTEM: Brand: INZII RETRIEVAL SYSTEM

## (undated) DEVICE — APPLIER CLP M L L11.4IN DIA10MM ENDOSCP ROT MULT FOR LIG

## (undated) DEVICE — Z DISCONTINUED PER MEDLINE LINE GAS SAMPLING O2/CO2 LNG AD 13 FT NSL W/ TBNG FILTERLINE

## (undated) DEVICE — PINNACLE INTRODUCER SHEATH: Brand: PINNACLE

## (undated) DEVICE — ELECTRODES QUIK COMBO RTS DEFIB

## (undated) DEVICE — 3M™ TEGADERM™ TRANSPARENT FILM DRESSING FRAME STYLE, 1626W, 4 IN X 4-3/4 IN (10 CM X 12 CM), 50/CT 4CT/CASE: Brand: 3M™ TEGADERM™

## (undated) DEVICE — HYPODERMIC SAFETY NEEDLE: Brand: MAGELLAN

## (undated) DEVICE — SUTURE VICRYL + SZ 0 L27IN ABSRB VLT L26MM UR-6 5/8 CIR VCP603H

## (undated) DEVICE — LIQUIBAND RAPID ADHESIVE 36/CS 0.8ML: Brand: MEDLINE

## (undated) DEVICE — CANNULA,OXY,ADULT,SUPER SOFT,W/14'TUB,UC: Brand: MEDLINE INDUSTRIES, INC.

## (undated) DEVICE — SOLUTION IRRIG 3000ML 0.9% SOD CHL USP UROMATIC PLAS CONT

## (undated) DEVICE — ELECTRODE PT RET AD L9FT HI MOIST COND ADH HYDRGEL CORDED

## (undated) DEVICE — KIT,1200CC CANISTER,3/16"X6' TUBING: Brand: MEDLINE INDUSTRIES, INC.

## (undated) DEVICE — CATH IV AUTOGRD BC PNK 20GA 25 -- INSYTE

## (undated) DEVICE — CABLE REPROC EP DIAG EXT SUPRM GRY

## (undated) DEVICE — GLOVE SURG SZ 8 CRM LTX FREE POLYISOPRENE POLYMER BEAD ANTI

## (undated) DEVICE — SET ADMIN 16ML TBNG L100IN 2 Y INJ SITE IV PIGGY BK DISP

## (undated) DEVICE — SET EXT W/2 NEEDLELESS Y-SITES 4-WAY STOPCOCK

## (undated) DEVICE — ELECTRODE ES 36CM LAP FLAT L HK COAT DISP CLEANCOAT

## (undated) DEVICE — DRAIN SURG 19FR L025IN DIA63MM SIL CHN RND FULL FLUT CLS

## (undated) DEVICE — HEART CATH-MRMC: Brand: MEDLINE INDUSTRIES, INC.

## (undated) DEVICE — GOWN,SIRUS,NONRNF,SETINSLV,2XL,18/CS: Brand: MEDLINE

## (undated) DEVICE — CABLE REPROC EP DIAG EXT SUPRM RED

## (undated) DEVICE — C-ARM: Brand: UNBRANDED

## (undated) DEVICE — TOWEL 4 PLY TISS 19X30 SUE WHT

## (undated) DEVICE — SUTURE MONOCRYL SZ 4-0 L27IN ABSRB UD L19MM PS-2 1/2 CIR PRIM Y426H

## (undated) DEVICE — NON-REM POLYHESIVE PATIENT RETURN ELECTRODE: Brand: VALLEYLAB

## (undated) DEVICE — TEMPERATURE ABLATION CATHETER: Brand: BLAZER® II HTD

## (undated) DEVICE — SNARE ENDOSCP M L240CM W27MM SHTH DIA2.4MM CHN 2.8MM OVL

## (undated) DEVICE — GENERAL LAPAROSCOPY-MRMC: Brand: MEDLINE INDUSTRIES, INC.

## (undated) DEVICE — KIT CHOLGM POLYUR W/ KARLAN BLLN CATH 4FR L60CM 5MM INTRO

## (undated) DEVICE — STRAINER URIN CALC RNL MSH -- CONVERT TO ITEM 357634

## (undated) DEVICE — CABLE RMFG RSPONS ELEC EXT RED --

## (undated) DEVICE — CLICKLINE SCISSORS INSERT: Brand: CLICKLINE

## (undated) DEVICE — SYR 10ML LUER LOK 1/5ML GRAD --

## (undated) DEVICE — TROCAR: Brand: KII® SLEEVE

## (undated) DEVICE — INTRODUCER SHTH 8FR L63CM 8FR DIL GWIRE L145CM DIA0.038IN

## (undated) DEVICE — TROCAR: Brand: KII® OPTICAL ACCESS SYSTEM

## (undated) DEVICE — SYR 3ML LL TIP 1/10ML GRAD --

## (undated) DEVICE — 1200 GUARD II KIT W/5MM TUBE W/O VAC TUBE: Brand: GUARDIAN

## (undated) DEVICE — EP CATHETER QUAD SUPREME COURNAND

## (undated) DEVICE — SYRINGE MED 10ML LUERLOCK TIP W/O SFTY DISP

## (undated) DEVICE — COVER,MAYO STAND,XL,STERILE: Brand: MEDLINE

## (undated) DEVICE — SYRINGE 20ML LL S/C 50

## (undated) DEVICE — LAPAROSCOPIC TROCAR SLEEVE/SINGLE USE: Brand: KII® OPTICAL ACCESS SYSTEM

## (undated) DEVICE — ELECTRODE,RADIOTRANSLUCENT,FOAM,5PK: Brand: MEDLINE

## (undated) DEVICE — RETRIEVAL BALLOON CATHETER: Brand: EXTRACTOR™ PRO RX

## (undated) DEVICE — TRAP,MUCUS SPECIMEN, 80CC: Brand: MEDLINE

## (undated) DEVICE — CANNULATING SPHINCTEROTOME: Brand: JAGTOME™ REVOLUTION RX

## (undated) DEVICE — CABLE CATH CONN 150 CM EXTN EP DIAG REPROCESSED BLK

## (undated) DEVICE — BASIN EMSIS 16OZ GRAPHITE PLAS KID SHP MOLD GRAD FOR ORAL

## (undated) DEVICE — CONTAINER SPEC 20 ML LID NEUT BUFF FORMALIN 10 % POLYPR STS

## (undated) DEVICE — SYRINGE ANGIO CNTRST DEL 20 CC POLYCARB DK GRN MEDALLION

## (undated) DEVICE — BLADE CLIPPER GEN PURP NS

## (undated) DEVICE — SUTURE ETHILON SZ 2-0 L18IN NONABSORBABLE BLK L19MM PS-2 PRIM 593H

## (undated) DEVICE — SET TUBE INSUFFLATION HI FLOW PNEUMOCLEAR

## (undated) DEVICE — SOLIDIFIER FLD 2OZ 1500CC N DISINF IN BTL DISP SAFESORB

## (undated) DEVICE — CATHETER REPROC 6FR QUADRIPOL JSN 120CM SUPREME

## (undated) DEVICE — GLOVE SURG SZ 85 CRM LTX FREE POLYISOPRENE POLYMER BEAD ANTI